# Patient Record
Sex: FEMALE | Race: WHITE | NOT HISPANIC OR LATINO | Employment: OTHER | URBAN - METROPOLITAN AREA
[De-identification: names, ages, dates, MRNs, and addresses within clinical notes are randomized per-mention and may not be internally consistent; named-entity substitution may affect disease eponyms.]

---

## 2018-01-29 ENCOUNTER — OFFICE VISIT (OUTPATIENT)
Dept: FAMILY MEDICINE | Facility: CLINIC | Age: 58
End: 2018-01-29
Payer: MEDICARE

## 2018-01-29 VITALS
DIASTOLIC BLOOD PRESSURE: 74 MMHG | BODY MASS INDEX: 26.8 KG/M2 | SYSTOLIC BLOOD PRESSURE: 118 MMHG | HEART RATE: 88 BPM | HEIGHT: 63 IN | WEIGHT: 151.25 LBS | TEMPERATURE: 98 F

## 2018-01-29 DIAGNOSIS — Z11.59 ENCOUNTER FOR HEPATITIS C SCREENING TEST FOR LOW RISK PATIENT: ICD-10-CM

## 2018-01-29 DIAGNOSIS — G25.0 ESSENTIAL TREMOR: ICD-10-CM

## 2018-01-29 DIAGNOSIS — G89.4 CHRONIC PAIN SYNDROME: ICD-10-CM

## 2018-01-29 DIAGNOSIS — E78.5 HYPERLIPIDEMIA, UNSPECIFIED HYPERLIPIDEMIA TYPE: ICD-10-CM

## 2018-01-29 DIAGNOSIS — Z12.39 SCREENING FOR MALIGNANT NEOPLASM OF BREAST: ICD-10-CM

## 2018-01-29 DIAGNOSIS — Z86.31 HISTORY OF DIABETIC ULCER OF FOOT: ICD-10-CM

## 2018-01-29 DIAGNOSIS — Z72.0 TOBACCO ABUSE: ICD-10-CM

## 2018-01-29 DIAGNOSIS — E11.42 DIABETIC POLYNEUROPATHY ASSOCIATED WITH TYPE 2 DIABETES MELLITUS: ICD-10-CM

## 2018-01-29 DIAGNOSIS — M51.36 DDD (DEGENERATIVE DISC DISEASE), LUMBAR: ICD-10-CM

## 2018-01-29 DIAGNOSIS — I10 ESSENTIAL HYPERTENSION: ICD-10-CM

## 2018-01-29 DIAGNOSIS — E11.42 TYPE 2 DIABETES MELLITUS WITH DIABETIC POLYNEUROPATHY, WITHOUT LONG-TERM CURRENT USE OF INSULIN: Primary | ICD-10-CM

## 2018-01-29 PROCEDURE — 99205 OFFICE O/P NEW HI 60 MIN: CPT | Mod: PBBFAC,PN | Performed by: INTERNAL MEDICINE

## 2018-01-29 PROCEDURE — 99204 OFFICE O/P NEW MOD 45 MIN: CPT | Mod: S$PBB,,, | Performed by: INTERNAL MEDICINE

## 2018-01-29 PROCEDURE — 99999 PR PBB SHADOW E&M-NEW PATIENT-LVL V: CPT | Mod: PBBFAC,,, | Performed by: INTERNAL MEDICINE

## 2018-01-29 RX ORDER — GABAPENTIN 100 MG/1
200 CAPSULE ORAL 3 TIMES DAILY
Qty: 180 CAPSULE | Refills: 1 | Status: SHIPPED | OUTPATIENT
Start: 2018-01-29 | End: 2018-04-25

## 2018-01-29 RX ORDER — GABAPENTIN 100 MG/1
100 CAPSULE ORAL 3 TIMES DAILY
COMMUNITY
End: 2018-01-29 | Stop reason: SDUPTHER

## 2018-01-29 RX ORDER — TRAMADOL HYDROCHLORIDE 50 MG/1
50 TABLET ORAL EVERY 12 HOURS PRN
COMMUNITY
End: 2018-06-12 | Stop reason: SDUPTHER

## 2018-01-29 RX ORDER — ATORVASTATIN CALCIUM 20 MG/1
20 TABLET, FILM COATED ORAL DAILY
COMMUNITY
End: 2018-06-19

## 2018-01-29 RX ORDER — LISINOPRIL AND HYDROCHLOROTHIAZIDE 12.5; 2 MG/1; MG/1
1 TABLET ORAL DAILY
COMMUNITY
End: 2018-05-23 | Stop reason: SDUPTHER

## 2018-01-29 RX ORDER — GLIPIZIDE 10 MG/1
5 TABLET, FILM COATED, EXTENDED RELEASE ORAL
COMMUNITY
End: 2018-10-12 | Stop reason: SDUPTHER

## 2018-01-29 RX ORDER — ASPIRIN 81 MG/1
81 TABLET ORAL DAILY
COMMUNITY

## 2018-01-29 RX ORDER — METFORMIN HYDROCHLORIDE 1000 MG/1
1000 TABLET ORAL 2 TIMES DAILY WITH MEALS
COMMUNITY
End: 2018-10-12 | Stop reason: SDUPTHER

## 2018-01-29 NOTE — PROGRESS NOTES
"Assessment and Plan:    1. Type 2 diabetes mellitus with diabetic polyneuropathy, without long-term current use of insulin    Suspect that diabetes has been poorly controlled. Reports that she does not like it when her blood sugar goes below 150 since this is "too low for me." Reports that her FBG is usually in the 180s-low 200s. Reports that it felt too high and she gets visual disturbances when her BG is >400. Reports that she has tried other oral medications (can not remember names) but that she did not like them because they made her blood sugar too low so that she felt lightheaded and pre-syncopal. Discussed that it is not ideal to continue to have BG this high, and that the feelings of it being too low typically go away with time once we could get it more normal.   Last A1c: Unknown, will check today  Foot exam: Today had decreased protective sensation, no ulcers, referral placed for podiatry  Eye exam: Needs, ordered today  Nephropathy screening: Due, ordered today  Lipids: On atorvastatin, will check lipids  Medications: Continue metformin and glipizide for now  We discussed the role of diet and exercise in managing diabetes at this visit.     - Hemoglobin A1c; Future  - Comprehensive metabolic panel; Future  - CBC auto differential; Future  - TSH; Future  - Microalbumin/creatinine urine ratio; Future  - Ambulatory referral to Optometry  - Ambulatory referral to Podiatry    2. Diabetic polyneuropathy associated with type 2 diabetes mellitus  - Ambulatory referral to Podiatry  - gabapentin (NEURONTIN) 100 MG capsule; Take 2 capsules (200 mg total) by mouth 3 (three) times daily.  Dispense: 180 capsule; Refill: 1    3. History of diabetic ulcer of foot  - Ambulatory referral to Podiatry    4. Essential hypertension  Controlled today. Continue lisinopril HCTZ.     5. Hyperlipidemia, unspecified hyperlipidemia type  Continue atorvastatin at current dose, will check lipids to see if a dose increase might be " warranted.   - Lipid panel; Future    6. Essential tremor  No need for medications, has been evaluated and told that she has benign essential tremor.     7. DDD (degenerative disc disease), lumbar  - Ambulatory referral to Pain Clinic    8. Chronic pain syndrome  She reports that the majority of her pain comes from the diabetic neuropathy as well as disc disease in her lower back. Discussed that chronic use of opiate pain medications including tramadol is not recommended. Discussed that it would be preferable to increase the dose of the gabapentin to help with nerve pain since she reports that the majority of her pain is from the diabetic nerve pain and use the tramadol only as needed. Also reports that ESIs have been very helpful for her, so hopefully will reduce need for pain medications.     9. Tobacco abuse  Reports that she enjoys smoking, thinks that it is not doing any damage to her, and would like to continue smoking. Reports that she is not at all interested in quitting.     10. Screening for malignant neoplasm of breast  - Mammo Digital Screening Bilat with CAD; Future    11. Encounter for hepatitis C screening test for low risk patient  - Hepatitis C antibody; Future      Colonoscopy was reportedly 2 years ago, can't remember the name of the doctor but thinks her PCP would have the records.   Last mammogram was 1 year ago, have always been normal  Pap was 2 years ago, not sure of the name of her OB/Gyn  ______________________________________________________________________  Subjective:    Chief Complaint:  New patient, establish care    HPI:  Mich is a 57 y.o. year old woman here to establish care. Just moved here from [place] last month. Was seeing Dr. Karla Lora for primary care in [place].     Diabetes- diagnosed 2006, has associated neuropathy. Has a history of diabetic foot infections, but has healed. Has not needed any amputations, but they have been considered in the past. Notes that her diabetes has  been typically poorly controlled. Notes that FBG used to be in the 400s, but lately has been in the high 100s. Has never been on insulin in the past. Has been on 2 other medications for diabetes that had adverse effects, thinks that one was invokana but can't remember for sure. Reports that she does not like her blood sugar to go below 150 because she feels lightheaded if her blood sugar is low.     HTN- Reports that blood pressure is typically well controlled on the current dose of the lisinopril/HCTZ. Has been on this dose for a while.     DDD- Was seeing Dr. Lugo for pain management in [place], had been getting steroid injections for this. His bilateral low back pain as well as sciatic nerve pain down her right leg.     Tobacco abuse- Notes that she smokes every day, but that she likes it and has no intention of quitting. Currently smoking 1/2 PPD and notes that she has never smoked more than this.     Was evaluated by a cardiologist last year due to an abnormality on her EKG, but she was told that her heart was fine. Had some tests, but not sure if she had a stress test.     Past Medical History:  Past Medical History:   Diagnosis Date    DDD (degenerative disc disease), lumbar     Diabetes mellitus     Essential tremor     HTN (hypertension)        Past Surgical History:  Past Surgical History:   Procedure Laterality Date    CHOLECYSTECTOMY         Family History:  Family History   Problem Relation Age of Onset    Heart disease Mother     Diabetes type II Father     Dementia Father     Heart disease Father        Social History:  Social History     Social History    Marital status:      Spouse name: N/A    Number of children: N/A    Years of education: N/A     Social History Main Topics    Smoking status: Current Every Day Smoker     Packs/day: 0.50     Years: 40.00    Smokeless tobacco: Never Used    Alcohol use 0.0 - 3.0 oz/week    Drug use: No    Sexual activity: Not Currently      Other Topics Concern    None     Social History Narrative    None       Medications:      Current Outpatient Prescriptions:     aspirin (ECOTRIN) 81 MG EC tablet, Take 81 mg by mouth once daily., Disp: , Rfl:     atorvastatin (LIPITOR) 20 MG tablet, Take 20 mg by mouth once daily., Disp: , Rfl:     gabapentin (NEURONTIN) 100 MG capsule, Take 100 mg by mouth 3 (three) times daily., Disp: , Rfl:     glipiZIDE (GLUCOTROL) 10 MG TR24, Take 5 mg by mouth daily with breakfast., Disp: , Rfl:     lisinopril-hydrochlorothiazide (PRINZIDE,ZESTORETIC) 20-12.5 mg per tablet, Take 1 tablet by mouth once daily., Disp: , Rfl:     metFORMIN (GLUCOPHAGE) 1000 MG tablet, Take 1,000 mg by mouth 2 (two) times daily with meals., Disp: , Rfl:     traMADol (ULTRAM) 50 mg tablet, Take 50 mg by mouth every 12 (twelve) hours as needed for Pain., Disp: , Rfl:       Allergies:  Patient has no known allergies.    Immunizations:    There is no immunization history on file for this patient.     Reports that she thinks vaccines are up to date  Had a tetanus shot a few months ago  Thinks that she had a pneumonia shot 2 years ago    Review of Systems:  Review of Systems   Constitutional: Negative for chills, fever and unexpected weight change.   HENT: Negative for congestion and trouble swallowing.    Eyes: Negative for pain and visual disturbance.   Respiratory: Negative for shortness of breath and wheezing.    Cardiovascular: Negative for chest pain and leg swelling.   Gastrointestinal: Negative for abdominal pain, constipation and diarrhea.   Endocrine: Negative for cold intolerance and heat intolerance.   Genitourinary: Negative for difficulty urinating and hematuria.   Musculoskeletal: Positive for back pain and gait problem. Negative for myalgias.   Skin: Negative for rash and wound.   Allergic/Immunologic: Negative for environmental allergies and food allergies.   Neurological: Negative for seizures and syncope.  "  Psychiatric/Behavioral: Positive for dysphoric mood. The patient is not nervous/anxious.        Objective:    Vitals:  Vitals:    01/29/18 0944   BP: 118/74   Pulse: 88   Temp: 97.9 °F (36.6 °C)   Weight: 68.6 kg (151 lb 3.8 oz)   Height: 5' 3" (1.6 m)   PainSc:   8   PainLoc: Back       Physical Exam   Constitutional: She is oriented to person, place, and time. She appears well-developed and well-nourished. No distress.   HENT:   Mouth/Throat: Oropharynx is clear and moist. No oropharyngeal exudate.   Eyes: Conjunctivae are normal. Right eye exhibits no discharge. Left eye exhibits no discharge. No scleral icterus.   Neck: Neck supple. No tracheal deviation present. No thyromegaly present.   Cardiovascular: Normal rate, regular rhythm, normal heart sounds and intact distal pulses.    No murmur heard.  Pulses:       Dorsalis pedis pulses are 2+ on the right side, and 2+ on the left side.   Pulmonary/Chest: Effort normal and breath sounds normal. No respiratory distress. She has no wheezes. She has no rales.   Abdominal: Soft. Bowel sounds are normal. She exhibits no distension. There is no tenderness. No hernia.   no HSM   Musculoskeletal: She exhibits no edema.        Right foot: There is no deformity.        Left foot: There is no deformity.   midline low back tenderness as well as lumbar paraspinal tenderness noted, positive straight leg raise bilaterally, pain reported with all movement   Feet:   Right Foot:   Protective Sensation: 5 sites tested. 4 sites sensed.   Skin Integrity: Positive for dry skin. Negative for ulcer, skin breakdown or callus.   Left Foot:   Protective Sensation: 5 sites tested. 3 sites sensed.   Skin Integrity: Positive for dry skin. Negative for ulcer, skin breakdown or callus.   Lymphadenopathy:     She has no cervical adenopathy.   Neurological: She is alert and oriented to person, place, and time.   Skin: Skin is warm and dry. She is not diaphoretic.   Psychiatric: She has a normal " mood and affect. Her behavior is normal. Judgment and thought content normal.   Vitals reviewed.      Data:  No previous labs, imaging, or notes available. and [facility] sent for previous records.        Jannet Márquez MD  Internal Medicine

## 2018-01-31 ENCOUNTER — OFFICE VISIT (OUTPATIENT)
Dept: PAIN MEDICINE | Facility: CLINIC | Age: 58
End: 2018-01-31
Payer: MEDICARE

## 2018-01-31 VITALS
HEART RATE: 87 BPM | SYSTOLIC BLOOD PRESSURE: 120 MMHG | BODY MASS INDEX: 26.58 KG/M2 | DIASTOLIC BLOOD PRESSURE: 83 MMHG | WEIGHT: 150 LBS | HEIGHT: 63 IN

## 2018-01-31 DIAGNOSIS — M79.10 MYALGIA: ICD-10-CM

## 2018-01-31 DIAGNOSIS — M51.36 DDD (DEGENERATIVE DISC DISEASE), LUMBAR: ICD-10-CM

## 2018-01-31 DIAGNOSIS — M47.896 OTHER SPONDYLOSIS, LUMBAR REGION: ICD-10-CM

## 2018-01-31 DIAGNOSIS — M53.3 SACROILIAC JOINT PAIN: Primary | ICD-10-CM

## 2018-01-31 PROCEDURE — 20553 NJX 1/MLT TRIGGER POINTS 3/>: CPT | Mod: PBBFAC,PO | Performed by: ANESTHESIOLOGY

## 2018-01-31 PROCEDURE — 99999 PR PBB SHADOW E&M-EST. PATIENT-LVL III: CPT | Mod: PBBFAC,,, | Performed by: ANESTHESIOLOGY

## 2018-01-31 PROCEDURE — 99213 OFFICE O/P EST LOW 20 MIN: CPT | Mod: PBBFAC,PO | Performed by: ANESTHESIOLOGY

## 2018-01-31 PROCEDURE — 99204 OFFICE O/P NEW MOD 45 MIN: CPT | Mod: 25,S$PBB,, | Performed by: ANESTHESIOLOGY

## 2018-01-31 PROCEDURE — 20553 NJX 1/MLT TRIGGER POINTS 3/>: CPT | Mod: S$PBB,,, | Performed by: ANESTHESIOLOGY

## 2018-01-31 RX ORDER — BUPIVACAINE HYDROCHLORIDE 2.5 MG/ML
10 INJECTION, SOLUTION EPIDURAL; INFILTRATION; INTRACAUDAL ONCE
Status: DISCONTINUED | OUTPATIENT
Start: 2018-01-31 | End: 2018-02-09

## 2018-01-31 NOTE — PROGRESS NOTES
This note was completed with dictation software and grammatical errors may exist.    Referring Physician: Jannet Márquez MD    PCP: Jannet Márquez MD    CC:R low back and R leg pain    HPI:   Mich Knight is a 57 y.o. female with a history of low back pain who recently moved down from [place]. Within the last 2 years she has had progression of low back pain and was seeing a PM&R physician in [place] (Dr. Ramu Lugo in [place]). She was receiving epidurals every 4 months, which she states was helping her pain, with TPIs every month in between. The pain begins in her low back, radiates primarily to her R buttock and down the leg to above the knee. She rarely has pain that goes past her R knee. She has concomitant diabetic neuropathy for which she recently increased her gabapentin dose. The pain as described as throbbing, shooting, deep, exacerbated by standing, sitting, bending, and walking. She denies any leg weakness. No bowel or bladder incontinence. She rates her pain as a 10 today.     ROS:  CONSTITUTIONAL: No fevers, chills, night sweats, wt. loss, appetite changes  SKIN: no rashes or itching  ENT: No headaches, head trauma, vision changes, or eye pain  LYMPH NODES: None noticed   CV: No chest pain, palpitations.   RESP: No shortness of breath, dyspnea on exertion, cough, wheezing, or hemoptysis  GI: No nausea, emesis, diarrhea, constipation, melena, hematochezia, pain.    : No dysuria, hematuria, urgency, or frequency   HEME: No easy bruising, bleeding problems  PSYCHIATRIC: No anxiety, psychosis, hallucinations. +ve depression  NEURO: No seizures, memory loss, dizziness or difficulty sleeping  MSK: +ve per HPI      Past Medical History:   Diagnosis Date    DDD (degenerative disc disease), lumbar     Diabetes mellitus     Essential tremor     HLD (hyperlipidemia)     HTN (hypertension)      Past Surgical History:   Procedure Laterality Date    CHOLECYSTECTOMY       Family History   Problem Relation  "Age of Onset    Heart disease Mother     Diabetes type II Father     Dementia Father     Heart disease Father      Social History     Social History    Marital status:      Spouse name: N/A    Number of children: N/A    Years of education: N/A     Social History Main Topics    Smoking status: Current Every Day Smoker     Packs/day: 0.50     Years: 40.00    Smokeless tobacco: Never Used    Alcohol use 0.0 - 3.0 oz/week    Drug use: No    Sexual activity: Not Currently     Other Topics Concern    None     Social History Narrative    Does not work, reports being disabled due to back pain. Previously was .          Medications/Allergies: See med card    Vitals:    01/31/18 0806   BP: 120/83   Pulse: 87   Weight: 68 kg (150 lb)   Height: 5' 3" (1.6 m)   PainSc: 10-Worst pain ever   PainLoc: Back         Physical exam:    GENERAL: A and O x3, the patient appears well groomed and is in no acute distress.  Skin: No rashes or obvious lesions  HEENT: normocephalic, atraumatic  CARDIOVASCULAR:  Palpable peripheral pulses  LUNGS: easy work of breathing  ABDOMEN: soft, nontender   UPPER EXTREMITIES: Normal alignment, normal range of motion, no atrophy, no skin changes,  hair growth and nail growth normal and equal bilaterally. No swelling, no tenderness.    LOWER EXTREMITIES:  Normal alignment, normal range of motion, no atrophy, no skin changes,  hair growth and nail growth normal and equal bilaterally. No swelling, no tenderness.    LUMBAR SPINE  Lumbar spine: ROM is full with flexion extension and oblique extension with no increased pain.    Levi's test causes increased pain on R side with bilateral maneuvers, mimicking the pain bringing her into the office  Supine straight leg raise is negative bilaterally.    Internal and external rotation of the hip causes no increased pain on either side.  Myofascial exam: tenderness with palpation along lower lumbar region, +ve tenderness along R SI " "joint      MENTAL STATUS: normal orientation, speech, language, and fund of knowledge for social situation.  Emotional state appropriate.    CRANIAL NERVES:  II:  PERRL bilaterally,   III,IV,VI: EOMI.    V:  Facial sensation equal bilaterally  VII:  Facial motor function normal.  VIII:  Hearing equal to finger rub bilaterally  IX/X: Gag normal, palate symmetric  XI:  Shoulder shrug equal, head turn equal  XII:  Tongue midline without fasciculations      MOTOR: Tone and bulk: normal bilateral upper and lower Strength: normal   Delt Bi Tri WE WF     R 5 5 5 5 5 5   L 5 5 5 5 5 5     IP ADD ABD Quad TA Gas HAM  R 4 4 4 4 4 4 4  L 4 4 4 4 4 4 4    SENSATION: Light touch and pinprick intact bilaterally  REFLEXES: normal, symmetric, nonbrisk.  Toes down, no clonus. No hoffmans.  GAIT: normal rise, base, steps, and arm swing.        Imaging:  None available.     Had MRI ~ 2 years ago which had "disc disease" per her report    Assessment:  58 yo female with low back pain radiating to R leg - maneuvers and history suggestive of SI joint disease  1. Sacroiliac joint pain    2. DDD (degenerative disc disease), lumbar    3. Other spondylosis, lumbar region    4. Myalgia        Plan:  - I have stressed the importance of physical activity and exercise to improve overall health  - Request past records to review  - I think that the patient's back pain are due to SI joint injection and have recommended a right SI joint injection.   - Perform TPIs to help with myofascial pain  - Follow up after procedure      Trigger point injection   Pre-procedure diagnosis: Myofascial trigger points in lumbar paraspinal region  Post-procedure diagnosis: Same    Upon examination, 4 trigger points were noted in the above noted regions.   Timeout performed. After the procedure was described and informed consent obtained, the skin over the trigger points was cleaned with isopropyl alcohol. Using a 27-gauge needle, injection of 2ml of 0.25% " bupivicaine was injected into each trigger point. The patient noted concordant radiating pain upon injection of her trigger points. The skin was then cleaned and bandages were applied to sites as necessary. Blood loss was <2ml. We observed the patient for 10 minutes after the procedure for any complications, and as there were none noted, the patient was then discharged home with instructions. We advised the patient that during the duration of the local anesthetic effect, this would be the optimal time to perform stretching exercises for the muscles which contain the trigger points. We also advised the patient to continue these exercises daily as this would likely give the best chance of resolution of the trigger points.

## 2018-01-31 NOTE — LETTER
January 31, 2018      Jannet Márquez MD  [address]  [place] [address]           [facility]  [address] [place] [address]  [address]  [place] [address]  Phone: [phone]          Patient: Mich Knight   MR Number: [id number]   YOB: 1960   Date of Visit: 1/31/2018       Dear Dr. Jannet Márquez:    Thank you for referring Mich Knight to me for evaluation. Attached you will find relevant portions of my assessment and plan of care.    If you have questions, please do not hesitate to call me. I look forward to following Mich Knight along with you.    Sincerely,    Charles Knowles MD    Enclosure  CC:  No Recipients    If you would like to receive this communication electronically, please contact [email or web address] or [phone] to request more information on [employer] Link access.    For providers and/or their staff who would like to refer a patient to [facility], please contact us through our one-stop-shop provider referral line, [facility], at [phone].    If you feel you have received this communication in error or would no longer like to receive these types of communications, please e-mail [email or web address]

## 2018-02-01 DIAGNOSIS — M46.1 SACROILIITIS: Primary | ICD-10-CM

## 2018-02-02 DIAGNOSIS — Z12.11 COLON CANCER SCREENING: ICD-10-CM

## 2018-02-07 ENCOUNTER — DOCUMENTATION ONLY (OUTPATIENT)
Dept: PAIN MEDICINE | Facility: CLINIC | Age: 58
End: 2018-02-07

## 2018-02-07 NOTE — PROGRESS NOTES
MRI L-spine 9/2016      L4-5, moderate disc desiccation.  Concentric disc bulge with small central protrusion.  Left greater than right subarticular protrusion.  Moderate bilateral facet arthropathy.  Mild central canal stenosis.  Moderate advanced left foraminal stenosis.  L5-S1 advanced disc desiccation loss of disc height.  Broad-based disc protrusion to the right.  Moderate bilateral facet arthropathy.  There is advanced right foraminal stenosis.  A minimal left foraminal stenosis.

## 2018-02-09 ENCOUNTER — SURGERY (OUTPATIENT)
Age: 58
End: 2018-02-09

## 2018-02-09 ENCOUNTER — HOSPITAL ENCOUNTER (OUTPATIENT)
Facility: AMBULARY SURGERY CENTER | Age: 58
Discharge: HOME | End: 2018-02-09
Attending: ANESTHESIOLOGY | Admitting: ANESTHESIOLOGY
Payer: MEDICARE

## 2018-02-09 DIAGNOSIS — G89.29 CHRONIC SI JOINT PAIN: Primary | ICD-10-CM

## 2018-02-09 DIAGNOSIS — M53.3 CHRONIC SI JOINT PAIN: Primary | ICD-10-CM

## 2018-02-09 DIAGNOSIS — G89.4 CHRONIC PAIN SYNDROME: ICD-10-CM

## 2018-02-09 PROCEDURE — 99152 MOD SED SAME PHYS/QHP 5/>YRS: CPT | Mod: ,,, | Performed by: ANESTHESIOLOGY

## 2018-02-09 PROCEDURE — G8907 PT DOC NO EVENTS ON DISCHARG: HCPCS | Performed by: ANESTHESIOLOGY

## 2018-02-09 PROCEDURE — 27096 INJECT SACROILIAC JOINT: CPT | Mod: RT,,, | Performed by: ANESTHESIOLOGY

## 2018-02-09 PROCEDURE — 27096 INJECT SACROILIAC JOINT: CPT | Performed by: ANESTHESIOLOGY

## 2018-02-09 RX ORDER — DEXAMETHASONE SODIUM PHOSPHATE 10 MG/ML
INJECTION INTRAMUSCULAR; INTRAVENOUS
Status: DISCONTINUED
Start: 2018-02-09 | End: 2018-02-09 | Stop reason: HOSPADM

## 2018-02-09 RX ORDER — SODIUM CHLORIDE, SODIUM LACTATE, POTASSIUM CHLORIDE, CALCIUM CHLORIDE 600; 310; 30; 20 MG/100ML; MG/100ML; MG/100ML; MG/100ML
INJECTION, SOLUTION INTRAVENOUS ONCE AS NEEDED
Status: DISCONTINUED | OUTPATIENT
Start: 2018-02-09 | End: 2018-02-09 | Stop reason: HOSPADM

## 2018-02-09 RX ORDER — BUPIVACAINE HYDROCHLORIDE 2.5 MG/ML
INJECTION, SOLUTION EPIDURAL; INFILTRATION; INTRACAUDAL
Status: DISCONTINUED | OUTPATIENT
Start: 2018-02-09 | End: 2018-02-09 | Stop reason: HOSPADM

## 2018-02-09 RX ORDER — DEXAMETHASONE SODIUM PHOSPHATE 100 MG/10ML
INJECTION INTRAMUSCULAR; INTRAVENOUS
Status: DISCONTINUED | OUTPATIENT
Start: 2018-02-09 | End: 2018-02-09 | Stop reason: HOSPADM

## 2018-02-09 RX ORDER — LIDOCAINE HYDROCHLORIDE 10 MG/ML
INJECTION, SOLUTION EPIDURAL; INFILTRATION; INTRACAUDAL; PERINEURAL
Status: DISCONTINUED | OUTPATIENT
Start: 2018-02-09 | End: 2018-02-09 | Stop reason: HOSPADM

## 2018-02-09 RX ORDER — LIDOCAINE HYDROCHLORIDE 10 MG/ML
INJECTION, SOLUTION EPIDURAL; INFILTRATION; INTRACAUDAL; PERINEURAL
Status: DISCONTINUED
Start: 2018-02-09 | End: 2018-02-09 | Stop reason: HOSPADM

## 2018-02-09 RX ORDER — ALPRAZOLAM 0.25 MG/1
1 TABLET ORAL ONCE
Status: COMPLETED | OUTPATIENT
Start: 2018-02-09 | End: 2018-02-09

## 2018-02-09 RX ADMIN — DEXAMETHASONE SODIUM PHOSPHATE 10 MG: 100 INJECTION INTRAMUSCULAR; INTRAVENOUS at 02:02

## 2018-02-09 RX ADMIN — ALPRAZOLAM 1 MG: 0.25 TABLET ORAL at 01:02

## 2018-02-09 RX ADMIN — BUPIVACAINE HYDROCHLORIDE 3 MG: 2.5 INJECTION, SOLUTION EPIDURAL; INFILTRATION; INTRACAUDAL at 02:02

## 2018-02-09 RX ADMIN — LIDOCAINE HYDROCHLORIDE 5 ML: 10 INJECTION, SOLUTION EPIDURAL; INFILTRATION; INTRACAUDAL; PERINEURAL at 02:02

## 2018-02-09 NOTE — DISCHARGE SUMMARY
[facility]  Discharge Note  Short Stay    Admit Date: 2/9/2018    Discharge Date and Time: 2/9/2018    Attending Physician: Charles Knowles MD     Discharge Provider: Charles Knowles    Diagnoses:  Active Hospital Problems    Diagnosis  POA    *Chronic SI joint pain [M53.3, G89.29]  Yes      Resolved Hospital Problems    Diagnosis Date Resolved POA   No resolved problems to display.       Hospital Course: SI joint injection  Discharged Condition: Good    Final Diagnoses:   Active Hospital Problems    Diagnosis  POA    *Chronic SI joint pain [M53.3, G89.29]  Yes      Resolved Hospital Problems    Diagnosis Date Resolved POA   No resolved problems to display.       Disposition: Home or Self Care    Follow up/Patient Instructions:    Medications:'  Reconciled Home Medications:   Current Discharge Medication List      CONTINUE these medications which have NOT CHANGED    Details   gabapentin (NEURONTIN) 100 MG capsule Take 2 capsules (200 mg total) by mouth 3 (three) times daily.  Qty: 180 capsule, Refills: 1    Associated Diagnoses: Diabetic polyneuropathy associated with type 2 diabetes mellitus      lisinopril-hydrochlorothiazide (PRINZIDE,ZESTORETIC) 20-12.5 mg per tablet Take 1 tablet by mouth once daily.      traMADol (ULTRAM) 50 mg tablet Take 50 mg by mouth every 12 (twelve) hours as needed for Pain.      aspirin (ECOTRIN) 81 MG EC tablet Take 81 mg by mouth once daily.      atorvastatin (LIPITOR) 20 MG tablet Take 20 mg by mouth once daily.      glipiZIDE (GLUCOTROL) 10 MG TR24 Take 5 mg by mouth daily with breakfast.      metFORMIN (GLUCOPHAGE) 1000 MG tablet Take 1,000 mg by mouth 2 (two) times daily with meals.             Discharge Procedure Orders  Call MD for:  temperature >100.4     Call MD for:  persistent nausea and vomiting or diarrhea     Call MD for:  severe uncontrolled pain     Call MD for:  redness, tenderness, or signs of infection (pain, swelling, redness, odor or green/yellow discharge  around incision site)     Call MD for:  difficulty breathing or increased cough     Call MD for:  severe persistent headache          Follow up with MD in 2-3 weeks    Discharge Procedure Orders (must include Diet, Follow-up, Activity):    Discharge Procedure Orders (must include Diet, Follow-up, Activity)  Call MD for:  temperature >100.4     Call MD for:  persistent nausea and vomiting or diarrhea     Call MD for:  severe uncontrolled pain     Call MD for:  redness, tenderness, or signs of infection (pain, swelling, redness, odor or green/yellow discharge around incision site)     Call MD for:  difficulty breathing or increased cough     Call MD for:  severe persistent headache

## 2018-02-09 NOTE — OP NOTE
Procedure Date: 2/9/2018    PROCEDURE:  Right sacroiliac joint injection utilizing fluoroscopy.    DIAGNOSIS: Right sided sacroiliitis.  Post op diagnosis: same    PHYSICIAN: Charles Knowles MD    MEDICATIONS INJECTED:  Dexamethasone 10mg and 1.5ml Bupivacaine 0.25%.    LOCAL ANESTHETIC USED: Lidocaine 1%,2ml total.     SEDATION MEDICATIONS: RN IV sedation    ESTIMATED BLOOD LOSS: None    COMPLICATIONS: None    TECHNIQUE:   Time-out taken to identify patient and procedure side prior to starting the procedure. After placing the patient in the prone position, the patient was prepped and draped in the usual sterile fashion using ChloraPrep and sterile towels.  The area was determined under fluoroscopy in the AP view.  Lidocaine was injected by raising a wheal and going down to the periosteum using a 25-gauge 1.5 inch needle.  The 3.5 inch 22-gauge spinal needle was introduced into inferior opening of the right sacroiliac joint.  Negative pressure applied to confirm no intravascular placement.  0.5 ml of contrast dye was injected to confirm placement and to confirm that there was no vascular uptake. The medication was then injected slowly. The patient tolerated the procedure well.    The patient was monitored after the procedure.  The patient was given post procedure and discharge instructions to follow at home. The patient was discharged in a stable condition

## 2018-02-09 NOTE — H&P (VIEW-ONLY)
This note was completed with dictation software and grammatical errors may exist.    Referring Physician: Jannet Márquez MD    PCP: Jannet Márquez MD    CC:R low back and R leg pain    HPI:   Mich Knight is a 57 y.o. female with a history of low back pain who recently moved down from [place]. Within the last 2 years she has had progression of low back pain and was seeing a PM&R physician in [place] (Dr. Ramu Lugo in [place]). She was receiving epidurals every 4 months, which she states was helping her pain, with TPIs every month in between. The pain begins in her low back, radiates primarily to her R buttock and down the leg to above the knee. She rarely has pain that goes past her R knee. She has concomitant diabetic neuropathy for which she recently increased her gabapentin dose. The pain as described as throbbing, shooting, deep, exacerbated by standing, sitting, bending, and walking. She denies any leg weakness. No bowel or bladder incontinence. She rates her pain as a 10 today.     ROS:  CONSTITUTIONAL: No fevers, chills, night sweats, wt. loss, appetite changes  SKIN: no rashes or itching  ENT: No headaches, head trauma, vision changes, or eye pain  LYMPH NODES: None noticed   CV: No chest pain, palpitations.   RESP: No shortness of breath, dyspnea on exertion, cough, wheezing, or hemoptysis  GI: No nausea, emesis, diarrhea, constipation, melena, hematochezia, pain.    : No dysuria, hematuria, urgency, or frequency   HEME: No easy bruising, bleeding problems  PSYCHIATRIC: No anxiety, psychosis, hallucinations. +ve depression  NEURO: No seizures, memory loss, dizziness or difficulty sleeping  MSK: +ve per HPI      Past Medical History:   Diagnosis Date    DDD (degenerative disc disease), lumbar     Diabetes mellitus     Essential tremor     HLD (hyperlipidemia)     HTN (hypertension)      Past Surgical History:   Procedure Laterality Date    CHOLECYSTECTOMY       Family History   Problem Relation  "Age of Onset    Heart disease Mother     Diabetes type II Father     Dementia Father     Heart disease Father      Social History     Social History    Marital status:      Spouse name: N/A    Number of children: N/A    Years of education: N/A     Social History Main Topics    Smoking status: Current Every Day Smoker     Packs/day: 0.50     Years: 40.00    Smokeless tobacco: Never Used    Alcohol use 0.0 - 3.0 oz/week    Drug use: No    Sexual activity: Not Currently     Other Topics Concern    None     Social History Narrative    Does not work, reports being disabled due to back pain. Previously was .          Medications/Allergies: See med card    Vitals:    01/31/18 0806   BP: 120/83   Pulse: 87   Weight: 68 kg (150 lb)   Height: 5' 3" (1.6 m)   PainSc: 10-Worst pain ever   PainLoc: Back         Physical exam:    GENERAL: A and O x3, the patient appears well groomed and is in no acute distress.  Skin: No rashes or obvious lesions  HEENT: normocephalic, atraumatic  CARDIOVASCULAR:  Palpable peripheral pulses  LUNGS: easy work of breathing  ABDOMEN: soft, nontender   UPPER EXTREMITIES: Normal alignment, normal range of motion, no atrophy, no skin changes,  hair growth and nail growth normal and equal bilaterally. No swelling, no tenderness.    LOWER EXTREMITIES:  Normal alignment, normal range of motion, no atrophy, no skin changes,  hair growth and nail growth normal and equal bilaterally. No swelling, no tenderness.    LUMBAR SPINE  Lumbar spine: ROM is full with flexion extension and oblique extension with no increased pain.    Levi's test causes increased pain on R side with bilateral maneuvers, mimicking the pain bringing her into the office  Supine straight leg raise is negative bilaterally.    Internal and external rotation of the hip causes no increased pain on either side.  Myofascial exam: tenderness with palpation along lower lumbar region, +ve tenderness along R SI " "joint      MENTAL STATUS: normal orientation, speech, language, and fund of knowledge for social situation.  Emotional state appropriate.    CRANIAL NERVES:  II:  PERRL bilaterally,   III,IV,VI: EOMI.    V:  Facial sensation equal bilaterally  VII:  Facial motor function normal.  VIII:  Hearing equal to finger rub bilaterally  IX/X: Gag normal, palate symmetric  XI:  Shoulder shrug equal, head turn equal  XII:  Tongue midline without fasciculations      MOTOR: Tone and bulk: normal bilateral upper and lower Strength: normal   Delt Bi Tri WE WF     R 5 5 5 5 5 5   L 5 5 5 5 5 5     IP ADD ABD Quad TA Gas HAM  R 4 4 4 4 4 4 4  L 4 4 4 4 4 4 4    SENSATION: Light touch and pinprick intact bilaterally  REFLEXES: normal, symmetric, nonbrisk.  Toes down, no clonus. No hoffmans.  GAIT: normal rise, base, steps, and arm swing.        Imaging:  None available.     Had MRI ~ 2 years ago which had "disc disease" per her report    Assessment:  56 yo female with low back pain radiating to R leg - maneuvers and history suggestive of SI joint disease  1. Sacroiliac joint pain    2. DDD (degenerative disc disease), lumbar    3. Other spondylosis, lumbar region    4. Myalgia        Plan:  - I have stressed the importance of physical activity and exercise to improve overall health  - Request past records to review  - I think that the patient's back pain are due to SI joint injection and have recommended a right SI joint injection.   - Perform TPIs to help with myofascial pain  - Follow up after procedure      Trigger point injection   Pre-procedure diagnosis: Myofascial trigger points in lumbar paraspinal region  Post-procedure diagnosis: Same    Upon examination, 4 trigger points were noted in the above noted regions.   Timeout performed. After the procedure was described and informed consent obtained, the skin over the trigger points was cleaned with isopropyl alcohol. Using a 27-gauge needle, injection of 2ml of 0.25% " bupivicaine was injected into each trigger point. The patient noted concordant radiating pain upon injection of her trigger points. The skin was then cleaned and bandages were applied to sites as necessary. Blood loss was <2ml. We observed the patient for 10 minutes after the procedure for any complications, and as there were none noted, the patient was then discharged home with instructions. We advised the patient that during the duration of the local anesthetic effect, this would be the optimal time to perform stretching exercises for the muscles which contain the trigger points. We also advised the patient to continue these exercises daily as this would likely give the best chance of resolution of the trigger points.

## 2018-02-09 NOTE — DISCHARGE INSTRUCTIONS
Recovery After Procedural Sedation (Adult)  You have been given medicine by vein to make you sleep during your surgery. This may have included both a pain medicine and sleeping medicine. Most of the effects have worn off. But you may still have some drowsiness for the next 6 to 8 hours.  Home care  Follow these guidelines when you get home:  · For the next 8 hours, you should be watched by a responsible adult. This person should make sure your condition is not getting worse.  · Don't drink any alcohol for the next 24 hours.  · Don't drive, operate dangerous machinery, or make important business or personal decisions during the next 24 hours.  Note: Your healthcare provider may tell you not to take any medicine by mouth for pain or sleep in the next 4 hours. These medicines may react with the medicines you were given in the hospital. This could cause a much stronger response than usual.  Follow-up care  Follow up with your healthcare provider if you are not alert and back to your usual level of activity within 12 hours.  When to seek medical advice  Call your healthcare provider right away if any of these occur:  · Drowsiness gets worse  · Weakness or dizziness gets worse  · Repeated vomiting  · You can't be awakened   Date Last Reviewed: 10/18/2016  © 2027-8604 [employer]. [address] [place] [address], [place] [address]. All rights reserved. This information is not intended as a substitute for professional medical care. Always follow your healthcare professional's instructions.      Pain injection instructions:     Steroids take about a week to relieve pain.  Initially you may get pain relief from the local anesthetic but this may wear off before the steroid works.    No driving for 24 hrs   Activity as tolerated- gradually increase activities.  Dont lift over 10 lbs for 24 hrs   No heat at injection sites x 2 days. No hot tubs, saunas, swimming pools, or heating pads for 2 days.  Use ice pack for mild  swelling and for comfort at 20 minute intervals, 20 minutes on 20 minutes off.  May shower today. No tub baths for two days.      Resume Aspirin, Plavix, or Coumadin the day after the procedure unless otherwise instructed.   If diabetic,monitor your glucose carefully as steroids can increase glucose level    Seek immediate medical help for:   Severe increase in your usual pain or appearance of new pain.  Prolonged or increasing weakness or numbness in the legs or arms.    - Numbing medicine was injected that affects nerves that carry information from     the  muscles to brain and  The brain to the muscles.  This numbness can last 4-6 hrs so be very careful to prevent falls; get assistance standing or  walking.    Fever above 101 ,Drainage,redness,active bleeding, or increased swelling at the injection site.  Headache, shortness of breath, chest pain, or breathing problems.

## 2018-02-09 NOTE — PLAN OF CARE
Patient sitting in chair and states she is ready to go home. Patient states has clean ice pack at home for as needed use for pain;able to teach back usage and precautions. Patient denies nausea, pain or any limb weakness.Patient's friend chairside and states she is ready to take patient home; friend states she is driving.

## 2018-02-14 VITALS
HEIGHT: 63 IN | HEART RATE: 84 BPM | BODY MASS INDEX: 26.58 KG/M2 | OXYGEN SATURATION: 99 % | DIASTOLIC BLOOD PRESSURE: 82 MMHG | SYSTOLIC BLOOD PRESSURE: 117 MMHG | RESPIRATION RATE: 18 BRPM | TEMPERATURE: 98 F | WEIGHT: 150 LBS

## 2018-02-20 ENCOUNTER — LAB VISIT (OUTPATIENT)
Dept: LAB | Facility: HOSPITAL | Age: 58
End: 2018-02-20
Attending: INTERNAL MEDICINE
Payer: MEDICARE

## 2018-02-20 DIAGNOSIS — Z11.59 ENCOUNTER FOR HEPATITIS C SCREENING TEST FOR LOW RISK PATIENT: ICD-10-CM

## 2018-02-20 DIAGNOSIS — E78.5 HYPERLIPIDEMIA, UNSPECIFIED HYPERLIPIDEMIA TYPE: ICD-10-CM

## 2018-02-20 DIAGNOSIS — E11.42 TYPE 2 DIABETES MELLITUS WITH DIABETIC POLYNEUROPATHY, WITHOUT LONG-TERM CURRENT USE OF INSULIN: ICD-10-CM

## 2018-02-20 LAB
ALBUMIN SERPL BCP-MCNC: 3.4 G/DL
ALP SERPL-CCNC: 51 U/L
ALT SERPL W/O P-5'-P-CCNC: 30 U/L
ANION GAP SERPL CALC-SCNC: 9 MMOL/L
ANISOCYTOSIS BLD QL SMEAR: SLIGHT
AST SERPL-CCNC: 20 U/L
BASOPHILS # BLD AUTO: 0.1 K/UL
BASOPHILS NFR BLD: 0.8 %
BILIRUB SERPL-MCNC: 0.2 MG/DL
BUN SERPL-MCNC: 19 MG/DL
CALCIUM SERPL-MCNC: 9.5 MG/DL
CHLORIDE SERPL-SCNC: 103 MMOL/L
CHOLEST SERPL-MCNC: 186 MG/DL
CHOLEST/HDLC SERPL: 4.5 {RATIO}
CO2 SERPL-SCNC: 24 MMOL/L
CREAT SERPL-MCNC: 0.8 MG/DL
DIFFERENTIAL METHOD: ABNORMAL
EOSINOPHIL # BLD AUTO: 0.8 K/UL
EOSINOPHIL NFR BLD: 6.5 %
ERYTHROCYTE [DISTWIDTH] IN BLOOD BY AUTOMATED COUNT: 12.5 %
EST. GFR  (AFRICAN AMERICAN): >60 ML/MIN/1.73 M^2
EST. GFR  (NON AFRICAN AMERICAN): >60 ML/MIN/1.73 M^2
ESTIMATED AVG GLUCOSE: 154 MG/DL
GLUCOSE SERPL-MCNC: 169 MG/DL
HBA1C MFR BLD HPLC: 7 %
HCT VFR BLD AUTO: 34.8 %
HCV AB SERPL QL IA: POSITIVE
HDLC SERPL-MCNC: 41 MG/DL
HDLC SERPL: 22 %
HGB BLD-MCNC: 11.9 G/DL
IMM GRANULOCYTES # BLD AUTO: 0.05 K/UL
IMM GRANULOCYTES NFR BLD AUTO: 0.4 %
LDLC SERPL CALC-MCNC: ABNORMAL MG/DL
LYMPHOCYTES # BLD AUTO: 5.8 K/UL
LYMPHOCYTES NFR BLD: 46.9 %
MCH RBC QN AUTO: 30.6 PG
MCHC RBC AUTO-ENTMCNC: 34.2 G/DL
MCV RBC AUTO: 90 FL
MONOCYTES # BLD AUTO: 1.1 K/UL
MONOCYTES NFR BLD: 8.6 %
NEUTROPHILS # BLD AUTO: 4.6 K/UL
NEUTROPHILS NFR BLD: 36.8 %
NONHDLC SERPL-MCNC: 145 MG/DL
NRBC BLD-RTO: 0 /100 WBC
PLATELET # BLD AUTO: 367 K/UL
PLATELET BLD QL SMEAR: ABNORMAL
PMV BLD AUTO: 12 FL
POLYCHROMASIA BLD QL SMEAR: ABNORMAL
POTASSIUM SERPL-SCNC: 4.3 MMOL/L
PROT SERPL-MCNC: 7.8 G/DL
RBC # BLD AUTO: 3.89 M/UL
SODIUM SERPL-SCNC: 136 MMOL/L
TRIGL SERPL-MCNC: 401 MG/DL
TSH SERPL DL<=0.005 MIU/L-ACNC: 1.03 UIU/ML
WBC # BLD AUTO: 12.38 K/UL

## 2018-02-20 PROCEDURE — 85025 COMPLETE CBC W/AUTO DIFF WBC: CPT

## 2018-02-20 PROCEDURE — 84443 ASSAY THYROID STIM HORMONE: CPT

## 2018-02-20 PROCEDURE — 83036 HEMOGLOBIN GLYCOSYLATED A1C: CPT

## 2018-02-20 PROCEDURE — 80053 COMPREHEN METABOLIC PANEL: CPT

## 2018-02-20 PROCEDURE — 36415 COLL VENOUS BLD VENIPUNCTURE: CPT | Mod: PN

## 2018-02-20 PROCEDURE — 80061 LIPID PANEL: CPT

## 2018-02-20 PROCEDURE — 86803 HEPATITIS C AB TEST: CPT

## 2018-02-21 ENCOUNTER — TELEPHONE (OUTPATIENT)
Dept: FAMILY MEDICINE | Facility: CLINIC | Age: 58
End: 2018-02-21

## 2018-02-23 ENCOUNTER — OFFICE VISIT (OUTPATIENT)
Dept: FAMILY MEDICINE | Facility: CLINIC | Age: 58
End: 2018-02-23
Payer: MEDICARE

## 2018-02-23 ENCOUNTER — LAB VISIT (OUTPATIENT)
Dept: LAB | Facility: HOSPITAL | Age: 58
End: 2018-02-23
Attending: INTERNAL MEDICINE
Payer: MEDICARE

## 2018-02-23 VITALS
TEMPERATURE: 98 F | WEIGHT: 149.06 LBS | HEIGHT: 63 IN | HEART RATE: 104 BPM | SYSTOLIC BLOOD PRESSURE: 122 MMHG | BODY MASS INDEX: 26.41 KG/M2 | DIASTOLIC BLOOD PRESSURE: 64 MMHG

## 2018-02-23 DIAGNOSIS — R76.8 HEPATITIS C ANTIBODY POSITIVE IN BLOOD: ICD-10-CM

## 2018-02-23 DIAGNOSIS — G89.4 CHRONIC PAIN SYNDROME: ICD-10-CM

## 2018-02-23 DIAGNOSIS — R76.8 HEPATITIS C ANTIBODY POSITIVE IN BLOOD: Primary | ICD-10-CM

## 2018-02-23 DIAGNOSIS — Z11.4 ENCOUNTER FOR SCREENING FOR HIV: ICD-10-CM

## 2018-02-23 DIAGNOSIS — Z72.51 HIGH RISK SEXUAL BEHAVIOR: ICD-10-CM

## 2018-02-23 DIAGNOSIS — Z13.5 DIABETIC RETINOPATHY SCREENING: ICD-10-CM

## 2018-02-23 DIAGNOSIS — Z72.0 TOBACCO ABUSE: ICD-10-CM

## 2018-02-23 PROCEDURE — 99214 OFFICE O/P EST MOD 30 MIN: CPT | Mod: S$PBB,,, | Performed by: INTERNAL MEDICINE

## 2018-02-23 PROCEDURE — 99999 PR PBB SHADOW E&M-EST. PATIENT-LVL III: CPT | Mod: PBBFAC,,, | Performed by: INTERNAL MEDICINE

## 2018-02-23 PROCEDURE — 86704 HEP B CORE ANTIBODY TOTAL: CPT

## 2018-02-23 PROCEDURE — 99213 OFFICE O/P EST LOW 20 MIN: CPT | Mod: PBBFAC,PN | Performed by: INTERNAL MEDICINE

## 2018-02-23 PROCEDURE — 36415 COLL VENOUS BLD VENIPUNCTURE: CPT | Mod: PN

## 2018-02-23 PROCEDURE — 87522 HEPATITIS C REVRS TRNSCRPJ: CPT

## 2018-02-23 PROCEDURE — 86706 HEP B SURFACE ANTIBODY: CPT

## 2018-02-23 PROCEDURE — 87340 HEPATITIS B SURFACE AG IA: CPT

## 2018-02-23 PROCEDURE — 86703 HIV-1/HIV-2 1 RESULT ANTBDY: CPT

## 2018-02-23 NOTE — PROGRESS NOTES
Assessment and Plan:    1. Hepatitis C antibody positive in blood  Notes that she has never been told about a positive test before. Risk factors are mainly just sexual contacts, no history of IVDU or blood transfusions. Can not recall if she has ever been tested for hep B or HIV so will test for those as well. Discussed that we will check the Hep C RNA to ensure true positive, then refer to Hep C clinic for evaluation and discussion of possible treatment. Advised avoiding alcohol exposure.   - HEPATITIS C RNA, QUANTITATIVE, PCR; Future  - HIV-1 and HIV-2 antibodies; Future  - Hepatitis B core antibody, total; Future  - Hepatitis B surface antibody; Future  - Hepatitis B surface antigen; Future    2. High risk sexual behavior  - HIV-1 and HIV-2 antibodies; Future    3. Encounter for screening for HIV  - HIV-1 and HIV-2 antibodies; Future    4. Chronic pain syndrome  Recently had MARILIA but notes that this was only helpful for about a week. Has a prescription for tramadol, but unable to afford this currently so not taking it. Same with gabapentin. We discussed briefly that if she were to decide to quit smoking, she would likely save some money and be better able to afford medications.     5. Tobacco abuse  Not interested in quitting.         ______________________________________________________________________  Subjective:    Chief Complaint:  Follow up labs    HPI:  Mich is a 57 y.o. year old woman here to follow up labs.     She notes that she is not sure if she has ever had a test for Hep C in the past. No history of IVDU and no history of blood transfusions. Notes that she did have a sexual partner that she thinks later developed some sort of liver disease later. She notes that she did once try to give blood many years ago, and this was rejected but she is not sure why.     Back pain- Recently had an epidural steroid injection but notes that this was only helpful for one week. Has been out of the gabapentin due to  difficulty affording her medications. Notes that she has also run out of the tramadol. has a prescription for this, but has to wait until next week to have the money to afford it.     HLD- She has also run out of the atorvastatin about 5 days ago. Planning to refill this when she has the money.     Notes that she is still smoking. Not interested in quitting.     Medications:  Current Outpatient Prescriptions on File Prior to Visit   Medication Sig Dispense Refill    aspirin (ECOTRIN) 81 MG EC tablet Take 81 mg by mouth once daily.      atorvastatin (LIPITOR) 20 MG tablet Take 20 mg by mouth once daily.      gabapentin (NEURONTIN) 100 MG capsule Take 2 capsules (200 mg total) by mouth 3 (three) times daily. 180 capsule 1    glipiZIDE (GLUCOTROL) 10 MG TR24 Take 5 mg by mouth daily with breakfast.      lisinopril-hydrochlorothiazide (PRINZIDE,ZESTORETIC) 20-12.5 mg per tablet Take 1 tablet by mouth once daily.      metFORMIN (GLUCOPHAGE) 1000 MG tablet Take 1,000 mg by mouth 2 (two) times daily with meals.      traMADol (ULTRAM) 50 mg tablet Take 50 mg by mouth every 12 (twelve) hours as needed for Pain.       No current facility-administered medications on file prior to visit.        Review of Systems:  Review of Systems   Constitutional: Negative for chills and fever.   Respiratory: Negative for shortness of breath and wheezing.    Cardiovascular: Negative for chest pain and leg swelling.   Gastrointestinal: Negative for abdominal distention and abdominal pain.   Musculoskeletal: Positive for back pain and gait problem.   Neurological: Positive for numbness. Negative for seizures and syncope.       Past Medical History:  Past Medical History:   Diagnosis Date    DDD (degenerative disc disease), lumbar     Diabetes mellitus     Essential tremor     HLD (hyperlipidemia)     HTN (hypertension)        Objective:    Vitals:  Vitals:    02/23/18 0954 02/23/18 1035   BP: (!) 117/40 122/64   Pulse: 104    Temp:  "98.3 °F (36.8 °C)    Weight: 67.6 kg (149 lb 0.5 oz)    Height: 5' 3" (1.6 m)    PainSc: 10-Worst pain ever    PainLoc: Back        Physical Exam   Constitutional: She is oriented to person, place, and time. She appears well-developed and well-nourished. No distress.   HENT:   Mouth/Throat: Oropharynx is clear and moist.   Eyes: No scleral icterus.   Pulmonary/Chest: Effort normal. No respiratory distress.   Genitourinary: Guaiac stool:    Musculoskeletal: She exhibits no edema.   Neurological: She is alert and oriented to person, place, and time.   Skin: Skin is warm and dry.   Psychiatric: She has a normal mood and affect. Her behavior is normal.   Vitals reviewed.      Data:  Previous labs reviewed and pertinent for positive hep C antibody.      Jannet Márquez MD  Internal Medicine  "

## 2018-02-26 LAB
HBV CORE AB SERPL QL IA: NEGATIVE
HBV SURFACE AB SER-ACNC: NEGATIVE M[IU]/ML
HBV SURFACE AG SERPL QL IA: NEGATIVE
HIV 1+2 AB+HIV1 P24 AG SERPL QL IA: NEGATIVE

## 2018-02-27 ENCOUNTER — TELEPHONE (OUTPATIENT)
Dept: FAMILY MEDICINE | Facility: CLINIC | Age: 58
End: 2018-02-27

## 2018-02-27 DIAGNOSIS — B18.2 CHRONIC HEPATITIS C WITHOUT HEPATIC COMA: Primary | ICD-10-CM

## 2018-02-27 LAB
HCV LOG: 6.76 LOG (10) IU/ML
HCV RNA QUANT PCR: ABNORMAL IU/ML
HCV, QUALITATIVE: DETECTED IU/ML

## 2018-02-27 NOTE — TELEPHONE ENCOUNTER
Please call patient to let her know that the repeat testing did confirm the diagnosis of hepatitis C. The other tests for hepatitis B and HIV were negative, so no other infection has been identified. I have placed the referral to hepatology.

## 2018-03-02 ENCOUNTER — TELEPHONE (OUTPATIENT)
Dept: FAMILY MEDICINE | Facility: CLINIC | Age: 58
End: 2018-03-02

## 2018-03-02 NOTE — TELEPHONE ENCOUNTER
----- Message from Laina Mayo sent at 3/2/2018 10:06 AM CST -----  Contact: Patient  Patient is returning phone call from doctors office.  Call placed to pod, no answer.  Call Back#She stated that you have her number  Thanks

## 2018-03-02 NOTE — TELEPHONE ENCOUNTER
Spoke with pt and informed her of results per Dr. Márquez that repeat testing did confirm the diagnosis of hepatitis C. The other tests for hepatitis B and HIV were negative, so no other infection has been identified. Pt was informed that a referral for hepatology has been placed and pt was given contact number to schedule [phone].    Pt verbally understands.

## 2018-03-03 ENCOUNTER — DOCUMENTATION ONLY (OUTPATIENT)
Dept: TRANSPLANT | Facility: CLINIC | Age: 58
End: 2018-03-03

## 2018-03-03 NOTE — LETTER
March 3, 2018    Mich Knight  [address]  [place] [address]      Dear Mich Knight:    Your doctor has referred you to the [facility]. You will be contacted by our office and an initial appointment will then be scheduled for you.    We look forward to seeing you soon. If you have any further questions, please contact us at [phone].       Sincerely,        [facility]   [address] [place] [id number]  [phone]

## 2018-03-03 NOTE — NURSING
Pt records reviewed.   Pt will be referred to Hepatitis C clinic    Initial referral received  from the [facility]que.   Referring Provider/diagnosis  JANNET CLEMENTE Provider: Jannet Clemente MD   Diagnosis: Chronic hepatitis C without hepatic coma          Referral letter sent to provider and patient.

## 2018-03-05 ENCOUNTER — PATIENT OUTREACH (OUTPATIENT)
Dept: ADMINISTRATIVE | Facility: HOSPITAL | Age: 58
End: 2018-03-05

## 2018-03-05 NOTE — TELEPHONE ENCOUNTER
Spoke with pt and informed her of results and that she needs to contact Hepatology for eval.     Pt verbally understands and will contact [phone] to schedule.

## 2018-03-05 NOTE — LETTER
March 5, 2018    Mich Knight  [address] [place]  [place] [address]             [facility]  [address]  [facility] [address]  Phone: [phone] Dear Mrs. Knight:    [facility] is committed to your overall health.  To help you get the most out of each of your visits, we will review your information to make sure you are up to date on all of your recommended tests and/or procedures.      Dr. Jannet Márquez has found that your chart shows you may be due for your annual diabetic eye exam, pap smear, mammogram, colon cancer screening, and possibly some immunizations (tetanus, pneumonia, and flu).     If you have not had an eye exam in the past year, we now have a  Retinal Camera at the [facility].  If we do this exam the same day you see a member of your Primary Care team, we can save you from having to pay a second co pay.      Medicare does not cover all immunizations to be given in the clinic.  Check your benefits to ensure that you do not need to receive your immunizations at the pharmacy.    If you have had any of the above done at another facility, please bring the records or information with you so that your record at [facility] will be complete.  If you would like to schedule any of these, please contact me.    If you are currently taking medication, please bring it with you to your appointment for review.    If you have any questions or concerns, please don't hesitate to call.    Thank you for letting us care for you,  Dulce Maria Rock LPN Clinical Care Coordinator  [facility] [place] [facility] [place]  [phone]

## 2018-03-06 ENCOUNTER — TELEPHONE (OUTPATIENT)
Dept: HEPATOLOGY | Facility: CLINIC | Age: 58
End: 2018-03-06

## 2018-03-06 NOTE — TELEPHONE ENCOUNTER
Dr. Márquez ordered that patient be scheduled for hepatology consult.  Attempt made to scheduled patient to see PA Scheuermann. LVM and sent letter asking patient to call us for scheduling.

## 2018-03-08 ENCOUNTER — TELEPHONE (OUTPATIENT)
Dept: HEPATOLOGY | Facility: CLINIC | Age: 58
End: 2018-03-08

## 2018-03-08 NOTE — TELEPHONE ENCOUNTER
PA Scheuermann not in clinic on 5/22/18.  Attempt made to reach patient.  LVM that appt with provider moved to 5/15/18; appt notice mailed.

## 2018-03-14 ENCOUNTER — OFFICE VISIT (OUTPATIENT)
Dept: PAIN MEDICINE | Facility: CLINIC | Age: 58
End: 2018-03-14
Payer: MEDICARE

## 2018-03-14 VITALS
WEIGHT: 149 LBS | HEART RATE: 101 BPM | BODY MASS INDEX: 26.4 KG/M2 | DIASTOLIC BLOOD PRESSURE: 90 MMHG | SYSTOLIC BLOOD PRESSURE: 148 MMHG | HEIGHT: 63 IN

## 2018-03-14 DIAGNOSIS — M54.16 LUMBAR RADICULOPATHY: Primary | ICD-10-CM

## 2018-03-14 DIAGNOSIS — M46.1 SACROILIITIS: ICD-10-CM

## 2018-03-14 DIAGNOSIS — M51.36 DDD (DEGENERATIVE DISC DISEASE), LUMBAR: ICD-10-CM

## 2018-03-14 DIAGNOSIS — M79.18 MYOFASCIAL PAIN: ICD-10-CM

## 2018-03-14 PROCEDURE — 99214 OFFICE O/P EST MOD 30 MIN: CPT | Mod: S$PBB,,, | Performed by: PHYSICIAN ASSISTANT

## 2018-03-14 PROCEDURE — 99999 PR PBB SHADOW E&M-EST. PATIENT-LVL III: CPT | Mod: PBBFAC,,, | Performed by: PHYSICIAN ASSISTANT

## 2018-03-14 PROCEDURE — 99213 OFFICE O/P EST LOW 20 MIN: CPT | Mod: PBBFAC,PO | Performed by: PHYSICIAN ASSISTANT

## 2018-03-14 RX ORDER — GLIPIZIDE 10 MG/1
TABLET ORAL
COMMUNITY
Start: 2018-02-28 | End: 2018-03-19 | Stop reason: SDUPTHER

## 2018-03-14 NOTE — PROGRESS NOTES
Referring Physician: No ref. provider found    PCP: Jannet Márquez MD    CC:R low back and R leg pain    Interval History:  Mich Knight is a 57 y.o. female with low back pain who presents today for f/u s/p right SIJ injection. Reports very good relief for almost 2 weeks. Pain then returned to baseline. She has had moderate benefit in the past from right L5 TFESIs at a facility in [place] for 3 months at a time. She would like to scheduled a TFESI. She would like TPIs today. Pain begins in her low back, radiates primarily to her R buttock and down the leg to above the knee. She has concomitant diabetic neuropathy. The pain is described as throbbing, shooting, deep, exacerbated by standing, sitting, bending, and walking. She denies any leg weakness. No bowel or bladder incontinence. She takes Gabapentin 200 mg TID without benefit or SEs. She takes Tramadol BID with moderate benefit. This was prescribed in [place]. She rates her pain as a 10/10.   Pt has been seen in the clinic before, however pt is new to me.     History below per Dr. Knowles    HPI:   Mich Knight is a 57 y.o. female with a history of low back pain who recently moved down from [place]. Within the last 2 years she has had progression of low back pain and was seeing a PM&R physician in [place] (Dr. Ramu Lugo in [place]). She was receiving epidurals every 4 months, which she states was helping her pain, with TPIs every month in between. The pain begins in her low back, radiates primarily to her R buttock and down the leg to above the knee. She rarely has pain that goes past her R knee. She has concomitant diabetic neuropathy for which she recently increased her gabapentin dose. The pain as described as throbbing, shooting, deep, exacerbated by standing, sitting, bending, and walking. She denies any leg weakness. No bowel or bladder incontinence. She rates her pain as a 10 today.     ROS:  CONSTITUTIONAL: No fevers, chills, night sweats, wt. loss, appetite  "changes  SKIN: no rashes or itching  ENT: No headaches, head trauma, vision changes, or eye pain  LYMPH NODES: None noticed   CV: No chest pain, palpitations.   RESP: No shortness of breath, dyspnea on exertion, cough, wheezing, or hemoptysis  GI: No nausea, emesis, diarrhea, constipation, melena, hematochezia, pain.    : No dysuria, hematuria, urgency, or frequency   HEME: No easy bruising, bleeding problems  PSYCHIATRIC: No anxiety, psychosis, hallucinations. +ve depression  NEURO: No seizures, memory loss, dizziness or difficulty sleeping  MSK: +ve per HPI      Past Medical History:   Diagnosis Date    DDD (degenerative disc disease), lumbar     Diabetes mellitus     Essential tremor     HLD (hyperlipidemia)     HTN (hypertension)      Past Surgical History:   Procedure Laterality Date    CHOLECYSTECTOMY       Family History   Problem Relation Age of Onset    Heart disease Mother     Diabetes type II Father     Dementia Father     Heart disease Father      Social History     Social History    Marital status:      Spouse name: N/A    Number of children: N/A    Years of education: N/A     Social History Main Topics    Smoking status: Current Every Day Smoker     Packs/day: 0.50     Years: 40.00    Smokeless tobacco: Never Used    Alcohol use 0.0 - 3.0 oz/week    Drug use: No    Sexual activity: Not Currently     Other Topics Concern    None     Social History Narrative    Does not work, reports being disabled due to back pain. Previously was .          Medications/Allergies: See med card    Vitals:    03/14/18 0831   BP: (!) 148/90   Pulse: 101   Weight: 67.6 kg (149 lb)   Height: 5' 3" (1.6 m)   PainSc: 10-Worst pain ever   PainLoc: Back         Physical exam:    GENERAL: A and O x3, the patient appears well groomed and is in no acute distress.  Skin: No rashes or obvious lesions  HEENT: normocephalic, atraumatic  CARDIOVASCULAR:  tachycardia  LUNGS: non labored " breathing  ABDOMEN: soft, nontender   UPPER EXTREMITIES: Normal alignment, normal range of motion, no atrophy, no skin changes,  hair growth and nail growth normal and equal bilaterally. No swelling, no tenderness.    LOWER EXTREMITIES:  Normal alignment, normal range of motion, no atrophy, no skin changes,  hair growth and nail growth normal and equal bilaterally. No swelling, no tenderness.    LUMBAR SPINE  Lumbar spine: ROM is full with flexion extension and oblique extension with no increased pain.    Levi's test causes increased pain on R side with bilateral maneuvers  Supine straight leg raise is negative bilaterally.    Internal and external rotation of the hip causes no increased pain on either side.  Myofascial exam: tenderness with palpation along lower lumbar region, +ve tenderness along R SI joint      MENTAL STATUS: normal orientation, speech, language, and fund of knowledge for social situation.  Emotional state appropriate.    CRANIAL NERVES:  II:  PERRL bilaterally,   III,IV,VI: EOMI.    V:  Facial sensation equal bilaterally  VII:  Facial motor function normal.  VIII:  Hearing equal to finger rub bilaterally  IX/X: Gag normal, palate symmetric  XI:  Shoulder shrug equal, head turn equal  XII:  Tongue midline without fasciculations      MOTOR: Tone and bulk: normal bilateral upper and lower Strength: normal   Delt Bi Tri WE WF     R 5 5 5 5 5 5   L 5 5 5 5 5 5     IP ADD ABD Quad TA Gas HAM  R 4 4 4 4 4 4 4  L 4 4 4 4 4 4 4    SENSATION: Light touch and pinprick intact bilaterally  REFLEXES: normal, symmetric, nonbrisk.  Toes down, no clonus. No hoffmans.  GAIT: normal rise, base, steps, and arm swing.        Imaging:  MRI L-spine 9/2016        L4-5, moderate disc desiccation.  Concentric disc bulge with small central protrusion.  Left greater than right subarticular protrusion.  Moderate bilateral facet arthropathy.  Mild central canal stenosis.  Moderate advanced left foraminal  stenosis.  L5-S1 advanced disc desiccation loss of disc height.  Broad-based disc protrusion to the right.  Moderate bilateral facet arthropathy.  There is advanced right foraminal stenosis.  A minimal left foraminal stenosis.    Assessment:  56 yo female with low back pain radiating to R leg - maneuvers and history suggestive of SI joint disease  1. Lumbar radiculopathy    2. Sacroiliitis    3. DDD (degenerative disc disease), lumbar    4. Myofascial pain        Plan:  1. I have stressed the importance of physical activity and exercise to improve overall health  2. Reviewed past records  3. Dr. Knowles performed TPIs to help with myofascial pain  4. Schedule lumbar TFESI on right at L5. I have explained the risks, benefits, and alternatives of the procedure in detail. The patient voices understanding and all questions have been answered. The patient agrees to proceed as planned. Written Consent obtained.   5. Consider L5, S1, S2 MB Block if SIJ pain continues  6. Increase Gabapentin to 300 mg TID. Will titrate further as tolerated  7. F/u s/p MARILIA

## 2018-03-19 ENCOUNTER — OFFICE VISIT (OUTPATIENT)
Dept: FAMILY MEDICINE | Facility: CLINIC | Age: 58
End: 2018-03-19
Payer: MEDICARE

## 2018-03-19 ENCOUNTER — TELEPHONE (OUTPATIENT)
Dept: ADMINISTRATIVE | Facility: HOSPITAL | Age: 58
End: 2018-03-19

## 2018-03-19 VITALS
DIASTOLIC BLOOD PRESSURE: 80 MMHG | HEART RATE: 88 BPM | HEIGHT: 63 IN | WEIGHT: 146.81 LBS | TEMPERATURE: 98 F | BODY MASS INDEX: 26.01 KG/M2 | SYSTOLIC BLOOD PRESSURE: 122 MMHG

## 2018-03-19 DIAGNOSIS — Z72.0 TOBACCO ABUSE: ICD-10-CM

## 2018-03-19 DIAGNOSIS — Z00.00 PREVENTATIVE HEALTH CARE: ICD-10-CM

## 2018-03-19 DIAGNOSIS — E11.42 TYPE 2 DIABETES MELLITUS WITH DIABETIC POLYNEUROPATHY, WITHOUT LONG-TERM CURRENT USE OF INSULIN: Primary | ICD-10-CM

## 2018-03-19 DIAGNOSIS — G89.4 CHRONIC PAIN SYNDROME: ICD-10-CM

## 2018-03-19 DIAGNOSIS — I10 ESSENTIAL HYPERTENSION: ICD-10-CM

## 2018-03-19 DIAGNOSIS — E11.29 TYPE 2 DIABETES MELLITUS WITH MICROALBUMINURIA, WITHOUT LONG-TERM CURRENT USE OF INSULIN: ICD-10-CM

## 2018-03-19 DIAGNOSIS — E78.5 HYPERLIPIDEMIA, UNSPECIFIED HYPERLIPIDEMIA TYPE: ICD-10-CM

## 2018-03-19 DIAGNOSIS — B18.2 HEP C W/O COMA, CHRONIC: ICD-10-CM

## 2018-03-19 DIAGNOSIS — R80.9 TYPE 2 DIABETES MELLITUS WITH MICROALBUMINURIA, WITHOUT LONG-TERM CURRENT USE OF INSULIN: ICD-10-CM

## 2018-03-19 PROCEDURE — 99214 OFFICE O/P EST MOD 30 MIN: CPT | Mod: S$PBB,,, | Performed by: INTERNAL MEDICINE

## 2018-03-19 PROCEDURE — 99213 OFFICE O/P EST LOW 20 MIN: CPT | Mod: PBBFAC,PN | Performed by: INTERNAL MEDICINE

## 2018-03-19 PROCEDURE — 99999 PR PBB SHADOW E&M-EST. PATIENT-LVL III: CPT | Mod: PBBFAC,,, | Performed by: INTERNAL MEDICINE

## 2018-03-19 NOTE — Clinical Note
Pap and mammogram were with [facility] in [facility]. Colonoscopy would have been with Dr. Karla Lora also in [facility]. We had requested results previously from Dr. Lora and did not get them. Patient is amenable to stool test if we can't get the colonoscopy.

## 2018-03-19 NOTE — LETTER
March 19, 2018     [facility]   [place], [place] [facility]  [address]  [facility] [address]  Phone: [phone] March 19, 2018     Patient: Mich Knight    YOB: 1960   Date of Visit: 3/19/2018       To Whom It May Concern:    We are seeing Mich Knight in the clinic at [facility].  Jannet Márquez MD is their PCP.  She has an outstanding lab/procedure at the time we reviewed their chart.  To help with our Health Maintenance records will you please supply the following report(s):     [x]  Mammogram                                                []  Colonoscopy   [x]  Pap Smear                                                  []  Outside Lab Results   []  Dexa scans                                                   []  Eye Exam   []  Foot Exam                                                     [] Other___________   []  Outside Immunizations                             Please Fax to [facility] at .    Thank you for your help.  If my Care Coordinator can be of any assistance, you can call CHANEL Spicer at [phone].     If you have any questions or concerns, please don't hesitate to call.    Sincerely,        Jannet Márquez MD

## 2018-03-19 NOTE — PROGRESS NOTES
Assessment and Plan:    1. Type 2 diabetes mellitus with diabetic polyneuropathy, without long-term current use of insulin  2. Type 2 diabetes mellitus with microalbuminuria, without long-term current use of insulin  A1c shows decent control despite occasional high reported fasting BG at home. Notes that she has been trying to work on dietary changes since that time. Thinks that decreased stress since moving here has allowed her to better manage her diet  Last A1c: 7.0  Foot exam: Completed 1/29/18, had decreased protective sensation, no ulcers, referral placed for podiatry, advised to schedule this today  Eye exam: Due, previously ordered, advised to schedule this today  Nephropathy screening: Elevated with microalb:Cr 74.6 on 2/20/18, will continue to monitor HTN as well and consider increased dose of lisinopril  Lipids: On atorvastatin  Medications: Continue metformin and glipizide for now  We discussed the role of diet and exercise in managing diabetes at this visit.     3. Essential hypertension  Controlled today, continue same medications.     4. Hyperlipidemia, unspecified hyperlipidemia type  Continue atorvastatin. Lipid panel UTD and shows likely compliance.     5. Tobacco abuse  Not at all interested in quitting.     6. Chronic pain syndrome  Continue gabapentin, continue to see pain management    7. Hep C  Has been scheduled for apt with Heptology PA on 5/15/18    8. Preventative health care  Colonoscopy was reportedly 2 years ago, have requested this, patient agreeable to [employer] next visit if we can't get the records  Last mammogram was 1 year ago, have always been normal (records requested)  Pap was 2 years ago, not sure of the name of her OB/Gyn but today we found the clinic name and will request this  ______________________________________________________________________  Subjective:    Chief Complaint:  Follow up chronic medical conditions    HPI:  Mich is a 57 y.o. year old woman here to follow up  DM, HTN, Hep C, chronic pain, and tobacco abuse.     HTN- Takes lisinopril/HCTZ. Notes that she has been taking the medications regularly, no problems with this. Notes that she has not been checking BP at home. Typically controlled when she is at the doctor.    DM2- Notes that she has been working on trying to avoid high sugar foods, but still eats some sweets when she has cravings. Taking metformin 1000 mg BID and glipizide 5 mg daily. Checks her BG at home and reports that it has been below 200, but has been occasionally up as high as th 170s.     HLD- Takes atorvastatin, no difficulty with that.     Chronic pain- Notes that she has increased the gabapentin dose as prescribed by pain management. Has an MARILIA scheduled for next week.     Hep C- Recent diagnosis, has apt set up to see Hepatology in May    Tobacco abuse- Previously reported no interest whatsoever in quitting. Notes today again that she has no interest in quitting.     Medications:  Current Outpatient Prescriptions on File Prior to Visit   Medication Sig Dispense Refill    aspirin (ECOTRIN) 81 MG EC tablet Take 81 mg by mouth once daily.      atorvastatin (LIPITOR) 20 MG tablet Take 20 mg by mouth once daily.      gabapentin (NEURONTIN) 100 MG capsule Take 2 capsules (200 mg total) by mouth 3 (three) times daily. 180 capsule 1    glipiZIDE (GLUCOTROL) 10 MG TR24 Take 5 mg by mouth daily with breakfast.      lisinopril-hydrochlorothiazide (PRINZIDE,ZESTORETIC) 20-12.5 mg per tablet Take 1 tablet by mouth once daily.      metFORMIN (GLUCOPHAGE) 1000 MG tablet Take 1,000 mg by mouth 2 (two) times daily with meals.      traMADol (ULTRAM) 50 mg tablet Take 50 mg by mouth every 12 (twelve) hours as needed for Pain.      [DISCONTINUED] glipiZIDE (GLUCOTROL) 10 MG tablet        No current facility-administered medications on file prior to visit.        Review of Systems:  Review of Systems   Constitutional: Negative for chills and fever.   Respiratory:  "Negative for shortness of breath and wheezing.    Cardiovascular: Negative for chest pain and leg swelling.   Gastrointestinal: Negative for constipation and diarrhea.   Musculoskeletal: Positive for back pain and gait problem.   Neurological: Negative for seizures and syncope.       Past Medical History:  Past Medical History:   Diagnosis Date    DDD (degenerative disc disease), lumbar     Diabetes mellitus     Essential tremor     HLD (hyperlipidemia)     HTN (hypertension)        Objective:    Vitals:  Vitals:    03/19/18 0926   BP: 122/80   Pulse: 88   Temp: 98.4 °F (36.9 °C)   Weight: 66.6 kg (146 lb 13.2 oz)   Height: 5' 3" (1.6 m)   PainSc: 10-Worst pain ever   PainLoc: Back       Physical Exam   Constitutional: She is oriented to person, place, and time. She appears well-developed and well-nourished. No distress.   HENT:   Mouth/Throat: Oropharynx is clear and moist.   Eyes: No scleral icterus.   Cardiovascular: Normal rate and regular rhythm.    No murmur heard.  Pulmonary/Chest: Effort normal and breath sounds normal. No respiratory distress. She has no wheezes.   Musculoskeletal: She exhibits no edema.   Neurological: She is alert and oriented to person, place, and time.   Skin: Skin is warm and dry.   Psychiatric: She has a normal mood and affect. Her behavior is normal.   Vitals reviewed.      Data:    Labs reviewed, borderline anemia with normal MCV, A1c 7.0, elevated microalb:Cr, elevated TG, normal TSH    Jannet Márquez MD  Internal Medicine  "

## 2018-03-19 NOTE — PATIENT INSTRUCTIONS
Next visit, bring a written log of your blood sugar and blood pressure.     Do not forget about the Liver Clinic visit on May 15th.

## 2018-03-19 NOTE — LETTER
May 1, 2018    Mich Knight  [address] [facility] [address]             [facility]  [address]  [facility] [address]  Phone: [phone] Dear Mrs. Knight:    Dr. Márquez had me request your colonoscopy records from Dr. Lora, but I have yet to receive anything from them.  Sometimes these facilities require a signed release before they will share records.  If you have already signed a Release of Information form, would you mind contacting them to send us the records.  If not, the next time you are in the office, we will have you sign a Release of Information and try again.        If you have any questions or concerns, please don't hesitate to call.    Sincerely,        Stephania Heredia LPN

## 2018-03-19 NOTE — LETTER
March 22, 2018    [facility]               [facility]  [address]  [facility] [address]  Phone: [phone] March 22, 2018     Patient: Mich Knight    YOB: 1960   Date of Visit: 3/19/2018       To Whom It May Concern:    We are seeing Mich Knight in the clinic at [facility].  Jannet Márquez MD is their PCP.  She has an outstanding lab/procedure at the time we reviewed their chart.  To help with our Health Maintenance records will you please supply the following report(s):     [x]  Mammogram                                                [x]  Colonoscopy   [x]  Pap Smear                                                  []  Outside Lab Results   []  Dexa scans                                                   []  Eye Exam   []  Foot Exam                                                     [] Other___________   []  Outside Immunizations                             Please Fax to [facility] at .    Thank you for your help.  If my Care Coordinator can be of any assistance, you can call CHANEL Spicer at [phone].     This is the second attempt to acquire these records.    If you have any questions or concerns, please don't hesitate to call.    Sincerely,        Jannet Márquez MD

## 2018-03-19 NOTE — TELEPHONE ENCOUNTER
----- Message from Jannet Márquez MD sent at 3/19/2018  9:55 AM CDT -----  Pap and mammogram were with [facility] in [facility]. Colonoscopy would have been with Dr. Karla Lora also in [facility]. We had requested results previously from Dr. Lora and did not get them. Patient is amenable to stool test if we can't get the colonoscopy.     [address], [place] [address] [phone]  Fax  or [phone]

## 2018-03-20 DIAGNOSIS — M54.16 LUMBAR RADICULOPATHY: Primary | ICD-10-CM

## 2018-03-27 ENCOUNTER — HOSPITAL ENCOUNTER (OUTPATIENT)
Facility: AMBULARY SURGERY CENTER | Age: 58
Discharge: HOME | End: 2018-03-27
Attending: ANESTHESIOLOGY | Admitting: ANESTHESIOLOGY
Payer: MEDICARE

## 2018-03-27 ENCOUNTER — SURGERY (OUTPATIENT)
Age: 58
End: 2018-03-27

## 2018-03-27 VITALS
HEART RATE: 76 BPM | DIASTOLIC BLOOD PRESSURE: 86 MMHG | RESPIRATION RATE: 18 BRPM | HEIGHT: 63 IN | TEMPERATURE: 98 F | WEIGHT: 146 LBS | SYSTOLIC BLOOD PRESSURE: 135 MMHG | BODY MASS INDEX: 25.87 KG/M2 | OXYGEN SATURATION: 97 %

## 2018-03-27 DIAGNOSIS — M54.16 LUMBAR RADICULITIS: ICD-10-CM

## 2018-03-27 DIAGNOSIS — M51.36 DDD (DEGENERATIVE DISC DISEASE), LUMBAR: Primary | ICD-10-CM

## 2018-03-27 LAB — GLUCOSE SERPL-MCNC: 151 MG/DL (ref 70–110)

## 2018-03-27 PROCEDURE — 62323 NJX INTERLAMINAR LMBR/SAC: CPT | Performed by: ANESTHESIOLOGY

## 2018-03-27 PROCEDURE — 99152 MOD SED SAME PHYS/QHP 5/>YRS: CPT | Mod: ,,, | Performed by: ANESTHESIOLOGY

## 2018-03-27 PROCEDURE — 62323 NJX INTERLAMINAR LMBR/SAC: CPT | Mod: ,,, | Performed by: ANESTHESIOLOGY

## 2018-03-27 PROCEDURE — G8907 PT DOC NO EVENTS ON DISCHARG: HCPCS | Performed by: ANESTHESIOLOGY

## 2018-03-27 RX ORDER — BUPIVACAINE HYDROCHLORIDE 2.5 MG/ML
INJECTION, SOLUTION EPIDURAL; INFILTRATION; INTRACAUDAL
Status: DISCONTINUED | OUTPATIENT
Start: 2018-03-27 | End: 2018-03-27 | Stop reason: HOSPADM

## 2018-03-27 RX ORDER — LIDOCAINE HYDROCHLORIDE 10 MG/ML
INJECTION, SOLUTION EPIDURAL; INFILTRATION; INTRACAUDAL; PERINEURAL
Status: DISCONTINUED | OUTPATIENT
Start: 2018-03-27 | End: 2018-03-27 | Stop reason: HOSPADM

## 2018-03-27 RX ORDER — SODIUM CHLORIDE, SODIUM LACTATE, POTASSIUM CHLORIDE, CALCIUM CHLORIDE 600; 310; 30; 20 MG/100ML; MG/100ML; MG/100ML; MG/100ML
INJECTION, SOLUTION INTRAVENOUS ONCE AS NEEDED
Status: DISCONTINUED | OUTPATIENT
Start: 2018-03-27 | End: 2018-03-27 | Stop reason: HOSPADM

## 2018-03-27 RX ORDER — ALPRAZOLAM 0.25 MG/1
1 TABLET ORAL ONCE
Status: COMPLETED | OUTPATIENT
Start: 2018-03-27 | End: 2018-03-27

## 2018-03-27 RX ORDER — DEXAMETHASONE SODIUM PHOSPHATE 10 MG/ML
INJECTION INTRAMUSCULAR; INTRAVENOUS
Status: DISCONTINUED | OUTPATIENT
Start: 2018-03-27 | End: 2018-03-27 | Stop reason: HOSPADM

## 2018-03-27 RX ADMIN — LIDOCAINE HYDROCHLORIDE 5 ML: 10 INJECTION, SOLUTION EPIDURAL; INFILTRATION; INTRACAUDAL; PERINEURAL at 12:03

## 2018-03-27 RX ADMIN — DEXAMETHASONE SODIUM PHOSPHATE 10 MG: 10 INJECTION INTRAMUSCULAR; INTRAVENOUS at 12:03

## 2018-03-27 RX ADMIN — BUPIVACAINE HYDROCHLORIDE 3 ML: 2.5 INJECTION, SOLUTION EPIDURAL; INFILTRATION; INTRACAUDAL at 12:03

## 2018-03-27 RX ADMIN — ALPRAZOLAM 1 MG: 0.25 TABLET ORAL at 11:03

## 2018-03-27 NOTE — PLAN OF CARE
Patient sitting in chair states she is ready to go home. Denies pain nausea or any limb weakness. Ambulated to waiting vehicle accompanied by nurse where friend Teri was who states she  is driving the patient home was waiting. Discharge instructions given  And discussed with both pt and friend;both patient and friend able to teach back discharge instructions.

## 2018-03-27 NOTE — OP NOTE
PROCEDURE DATE: 3/27/2018    Procedure:   Interlaminar epidural steroid injection at L4-5 under fluoroscopic guidance.    Diagnosis: lUMBAR DISC DISPLACEMENT WITHOUT MYELOPATHY  pOSTOP DIAGNOSIS: sAME    Physician: Charles Knowles M.D.    Medications injected:10 mg dexamethasone with 4 ml of preservative free NaCl    Local anesthetic injected:    Lidocaine 1% 2 ml total    Sedation Medications: RN IV sedation    Estimated blood loss:  None    Complications:  Unable to access right L5 transforaminal foramen due to osteophytes.  Procedure changed to intralaminar approach to the right sided    Technique:  Time-out taken to identify patient and procedure prior to starting the procedure.  With the patient laying in a prone position, the area was prepped and draped in the usual sterile fashion using ChloraPrep and a fenestrated drape.  After determining the target level with an AP fluoroscopic view, local anesthetic was given using a 25-gauge 1.5 inch needle by raising a wheal and then infiltrating toward the interlaminar entry space.  A 3.5 inch 20-gauge Touhy needle was introduced under AP fluoroscopic guidance to the interlaminar space of L4-5. Once the trajectory was established, the needle was visualized in the lateral view and advanced using loss of resistance technique. Once in the desired position, 1ml contrast was injected to confirm placement and there was no vascular uptake nor intrathecal spread.  The medication was then injected slowly. The patient tolerated the procedure well.      The patient was monitored after the procedure.   They were given post-procedure and discharge instructions to follow at home.  The patient was discharged in a stable condition.

## 2018-03-27 NOTE — H&P (VIEW-ONLY)
Referring Physician: No ref. provider found    PCP: Jannet Márquez MD    CC:R low back and R leg pain    Interval History:  Mich Knight is a 57 y.o. female with low back pain who presents today for f/u s/p right SIJ injection. Reports very good relief for almost 2 weeks. Pain then returned to baseline. She has had moderate benefit in the past from right L5 TFESIs at a facility in [place] for 3 months at a time. She would like to scheduled a TFESI. She would like TPIs today. Pain begins in her low back, radiates primarily to her R buttock and down the leg to above the knee. She has concomitant diabetic neuropathy. The pain is described as throbbing, shooting, deep, exacerbated by standing, sitting, bending, and walking. She denies any leg weakness. No bowel or bladder incontinence. She takes Gabapentin 200 mg TID without benefit or SEs. She takes Tramadol BID with moderate benefit. This was prescribed in [place]. She rates her pain as a 10/10.   Pt has been seen in the clinic before, however pt is new to me.     History below per Dr. Knowles    HPI:   Mich Knight is a 57 y.o. female with a history of low back pain who recently moved down from [place]. Within the last 2 years she has had progression of low back pain and was seeing a PM&R physician in [place] (Dr. Ramu Lugo in [place]). She was receiving epidurals every 4 months, which she states was helping her pain, with TPIs every month in between. The pain begins in her low back, radiates primarily to her R buttock and down the leg to above the knee. She rarely has pain that goes past her R knee. She has concomitant diabetic neuropathy for which she recently increased her gabapentin dose. The pain as described as throbbing, shooting, deep, exacerbated by standing, sitting, bending, and walking. She denies any leg weakness. No bowel or bladder incontinence. She rates her pain as a 10 today.     ROS:  CONSTITUTIONAL: No fevers, chills, night sweats, wt. loss, appetite  "changes  SKIN: no rashes or itching  ENT: No headaches, head trauma, vision changes, or eye pain  LYMPH NODES: None noticed   CV: No chest pain, palpitations.   RESP: No shortness of breath, dyspnea on exertion, cough, wheezing, or hemoptysis  GI: No nausea, emesis, diarrhea, constipation, melena, hematochezia, pain.    : No dysuria, hematuria, urgency, or frequency   HEME: No easy bruising, bleeding problems  PSYCHIATRIC: No anxiety, psychosis, hallucinations. +ve depression  NEURO: No seizures, memory loss, dizziness or difficulty sleeping  MSK: +ve per HPI      Past Medical History:   Diagnosis Date    DDD (degenerative disc disease), lumbar     Diabetes mellitus     Essential tremor     HLD (hyperlipidemia)     HTN (hypertension)      Past Surgical History:   Procedure Laterality Date    CHOLECYSTECTOMY       Family History   Problem Relation Age of Onset    Heart disease Mother     Diabetes type II Father     Dementia Father     Heart disease Father      Social History     Social History    Marital status:      Spouse name: N/A    Number of children: N/A    Years of education: N/A     Social History Main Topics    Smoking status: Current Every Day Smoker     Packs/day: 0.50     Years: 40.00    Smokeless tobacco: Never Used    Alcohol use 0.0 - 3.0 oz/week    Drug use: No    Sexual activity: Not Currently     Other Topics Concern    None     Social History Narrative    Does not work, reports being disabled due to back pain. Previously was .          Medications/Allergies: See med card    Vitals:    03/14/18 0831   BP: (!) 148/90   Pulse: 101   Weight: 67.6 kg (149 lb)   Height: 5' 3" (1.6 m)   PainSc: 10-Worst pain ever   PainLoc: Back         Physical exam:    GENERAL: A and O x3, the patient appears well groomed and is in no acute distress.  Skin: No rashes or obvious lesions  HEENT: normocephalic, atraumatic  CARDIOVASCULAR:  tachycardia  LUNGS: non labored " breathing  ABDOMEN: soft, nontender   UPPER EXTREMITIES: Normal alignment, normal range of motion, no atrophy, no skin changes,  hair growth and nail growth normal and equal bilaterally. No swelling, no tenderness.    LOWER EXTREMITIES:  Normal alignment, normal range of motion, no atrophy, no skin changes,  hair growth and nail growth normal and equal bilaterally. No swelling, no tenderness.    LUMBAR SPINE  Lumbar spine: ROM is full with flexion extension and oblique extension with no increased pain.    Levi's test causes increased pain on R side with bilateral maneuvers  Supine straight leg raise is negative bilaterally.    Internal and external rotation of the hip causes no increased pain on either side.  Myofascial exam: tenderness with palpation along lower lumbar region, +ve tenderness along R SI joint      MENTAL STATUS: normal orientation, speech, language, and fund of knowledge for social situation.  Emotional state appropriate.    CRANIAL NERVES:  II:  PERRL bilaterally,   III,IV,VI: EOMI.    V:  Facial sensation equal bilaterally  VII:  Facial motor function normal.  VIII:  Hearing equal to finger rub bilaterally  IX/X: Gag normal, palate symmetric  XI:  Shoulder shrug equal, head turn equal  XII:  Tongue midline without fasciculations      MOTOR: Tone and bulk: normal bilateral upper and lower Strength: normal   Delt Bi Tri WE WF     R 5 5 5 5 5 5   L 5 5 5 5 5 5     IP ADD ABD Quad TA Gas HAM  R 4 4 4 4 4 4 4  L 4 4 4 4 4 4 4    SENSATION: Light touch and pinprick intact bilaterally  REFLEXES: normal, symmetric, nonbrisk.  Toes down, no clonus. No hoffmans.  GAIT: normal rise, base, steps, and arm swing.        Imaging:  MRI L-spine 9/2016        L4-5, moderate disc desiccation.  Concentric disc bulge with small central protrusion.  Left greater than right subarticular protrusion.  Moderate bilateral facet arthropathy.  Mild central canal stenosis.  Moderate advanced left foraminal  stenosis.  L5-S1 advanced disc desiccation loss of disc height.  Broad-based disc protrusion to the right.  Moderate bilateral facet arthropathy.  There is advanced right foraminal stenosis.  A minimal left foraminal stenosis.    Assessment:  58 yo female with low back pain radiating to R leg - maneuvers and history suggestive of SI joint disease  1. Lumbar radiculopathy    2. Sacroiliitis    3. DDD (degenerative disc disease), lumbar    4. Myofascial pain        Plan:  1. I have stressed the importance of physical activity and exercise to improve overall health  2. Reviewed past records  3. Dr. Knowles performed TPIs to help with myofascial pain  4. Schedule lumbar TFESI on right at L5. I have explained the risks, benefits, and alternatives of the procedure in detail. The patient voices understanding and all questions have been answered. The patient agrees to proceed as planned. Written Consent obtained.   5. Consider L5, S1, S2 MB Block if SIJ pain continues  6. Increase Gabapentin to 300 mg TID. Will titrate further as tolerated  7. F/u s/p MARILIA

## 2018-03-27 NOTE — DISCHARGE SUMMARY
[facility]  Discharge Note  Short Stay    Admit Date: 3/27/2018    Discharge Date and Time: 3/27/2018    Attending Physician: Charles Knowles MD     Discharge Provider: Charles Knowles    Diagnoses:  Active Hospital Problems    Diagnosis  POA    *Lumbar radiculitis [M54.16]  Yes      Resolved Hospital Problems    Diagnosis Date Resolved POA   No resolved problems to display.       Hospital Course: Lumbar MARILIA  Discharged Condition: Good    Final Diagnoses:   Active Hospital Problems    Diagnosis  POA    *Lumbar radiculitis [M54.16]  Yes      Resolved Hospital Problems    Diagnosis Date Resolved POA   No resolved problems to display.       Disposition: Home or Self Care    Follow up/Patient Instructions:    Medications:  Reconciled Home Medications:      Medication List      CONTINUE taking these medications    aspirin 81 MG EC tablet  Commonly known as:  ECOTRIN     atorvastatin 20 MG tablet  Commonly known as:  LIPITOR     gabapentin 100 MG capsule  Commonly known as:  NEURONTIN  Take 2 capsules (200 mg total) by mouth 3 (three) times daily.     glipiZIDE 10 MG Tr24  Commonly known as:  GLUCOTROL     lisinopril-hydrochlorothiazide 20-12.5 mg per tablet  Commonly known as:  PRINZIDE,ZESTORETIC     metFORMIN 1000 MG tablet  Commonly known as:  GLUCOPHAGE     traMADol 50 mg tablet  Commonly known as:  ULTRAM            Discharge Procedure Orders  Call MD for:  temperature >100.4     Call MD for:  persistent nausea and vomiting or diarrhea     Call MD for:  severe uncontrolled pain     Call MD for:  redness, tenderness, or signs of infection (pain, swelling, redness, odor or green/yellow discharge around incision site)     Call MD for:  difficulty breathing or increased cough     Call MD for:  severe persistent headache          Follow up with MD in 2-3 weeks    Discharge Procedure Orders (must include Diet, Follow-up, Activity):    Discharge Procedure Orders (must include Diet, Follow-up, Activity)  Call MD for:   temperature >100.4     Call MD for:  persistent nausea and vomiting or diarrhea     Call MD for:  severe uncontrolled pain     Call MD for:  redness, tenderness, or signs of infection (pain, swelling, redness, odor or green/yellow discharge around incision site)     Call MD for:  difficulty breathing or increased cough     Call MD for:  severe persistent headache

## 2018-03-27 NOTE — DISCHARGE INSTRUCTIONS
Recovery After Procedural Sedation (Adult)  You have been given medicine by vein to make you sleep during your surgery. This may have included both a pain medicine and sleeping medicine. Most of the effects have worn off. But you may still have some drowsiness for the next 6 to 8 hours.  Home care  Follow these guidelines when you get home:  · For the next 8 hours, you should be watched by a responsible adult. This person should make sure your condition is not getting worse.  · Don't drink any alcohol for the next 24 hours.  · Don't drive, operate dangerous machinery, or make important business or personal decisions during the next 24 hours.  Note: Your healthcare provider may tell you not to take any medicine by mouth for pain or sleep in the next 4 hours. These medicines may react with the medicines you were given in the hospital. This could cause a much stronger response than usual.  Follow-up care  Follow up with your healthcare provider if you are not alert and back to your usual level of activity within 12 hours.  When to seek medical advice  Call your healthcare provider right away if any of these occur:  · Drowsiness gets worse  · Weakness or dizziness gets worse  · Repeated vomiting  · You can't be awakened   Date Last Reviewed: 10/18/2016  © 8363-2383 [employer]. [address] [place] [address], [place] [address]. All rights reserved. This information is not intended as a substitute for professional medical care. Always follow your healthcare professional's instructions.      Pain injection instructions:     Steroids take about a 2 weeks to relieve pain.  Initially you may get pain relief from the local anesthetic but this may wear off before the steroid works.    No driving for 24 hrs   Activity as tolerated- gradually increase activities.  Dont lift over 10 lbs for 24 hrs   No heat at injection sites x 2 days. NO hot tubs, swimming saunas or heating pads for 2 days.  Use ice pack for mild  swelling and for comfort at 20 minute intervals. Apply for 20 minutes and remove for 20 minutes. No direct contact of ice to skin.  May shower today. No tub baths for two days.      Resume Aspirin, Plavix, or Coumadin the day after the procedure unless otherwise instructed.   If diabetic,monitor your glucose carefully as steroids can increase glucose level    Seek immediate medical help for:   Severe increase in your usual pain or appearance of new pain.  Prolonged (mor than 8 hours) or increasing weakness or numbness in the legs or arms.    - Numbing medicine was injected that affects nerves that carry information from the muscles to brain and the brain to the muscles.  This numbness can last 6-8 hrs so be very careful and get assistance standing or walking.    Fever above 101 ,Drainage,redness,active bleeding, or increased swelling at the injection site.  Headache, shortness of breath, chest pain, or breathing problems.

## 2018-03-27 NOTE — INTERVAL H&P NOTE
The patient has been examined and the H&P has been reviewed:    I concur with the findings and no changes have occurred since H&P was written.   This patient has been cleared for surgery in an ambulatory surgical facility    ASA 3,  Mallampatti Score 3  No history of anesthetic complications  Plan for RN IV sedation      Anesthesia/Surgery risks, benefits and alternative options discussed and understood by patient/family.          Active Hospital Problems    Diagnosis  POA    Lumbar radiculitis [M54.16]  Yes      Resolved Hospital Problems    Diagnosis Date Resolved POA   No resolved problems to display.

## 2018-04-16 ENCOUNTER — OFFICE VISIT (OUTPATIENT)
Dept: OPTOMETRY | Facility: CLINIC | Age: 58
End: 2018-04-16
Payer: MEDICARE

## 2018-04-16 ENCOUNTER — OFFICE VISIT (OUTPATIENT)
Dept: PODIATRY | Facility: CLINIC | Age: 58
End: 2018-04-16
Payer: MEDICARE

## 2018-04-16 VITALS — WEIGHT: 145.94 LBS | BODY MASS INDEX: 25.86 KG/M2 | HEIGHT: 63 IN

## 2018-04-16 DIAGNOSIS — H55.09 ROTARY NYSTAGMUS: ICD-10-CM

## 2018-04-16 DIAGNOSIS — E11.51 TYPE II DIABETES MELLITUS WITH PERIPHERAL CIRCULATORY DISORDER: Primary | ICD-10-CM

## 2018-04-16 DIAGNOSIS — H52.4 MYOPIA WITH ASTIGMATISM AND PRESBYOPIA, BILATERAL: ICD-10-CM

## 2018-04-16 DIAGNOSIS — H52.203 MYOPIA WITH ASTIGMATISM AND PRESBYOPIA, BILATERAL: ICD-10-CM

## 2018-04-16 DIAGNOSIS — M51.36 DDD (DEGENERATIVE DISC DISEASE), LUMBAR: ICD-10-CM

## 2018-04-16 DIAGNOSIS — H43.393 VITREOUS FLOATERS, BILATERAL: ICD-10-CM

## 2018-04-16 DIAGNOSIS — E11.9 DIABETES MELLITUS TYPE 2 WITHOUT RETINOPATHY: Primary | ICD-10-CM

## 2018-04-16 DIAGNOSIS — Z13.5 GLAUCOMA SCREENING: ICD-10-CM

## 2018-04-16 DIAGNOSIS — H52.13 MYOPIA WITH ASTIGMATISM AND PRESBYOPIA, BILATERAL: ICD-10-CM

## 2018-04-16 PROCEDURE — 99999 PR PBB SHADOW E&M-EST. PATIENT-LVL III: CPT | Mod: PBBFAC,,, | Performed by: OPTOMETRIST

## 2018-04-16 PROCEDURE — 99999 PR PBB SHADOW E&M-EST. PATIENT-LVL III: CPT | Mod: PBBFAC,,, | Performed by: PODIATRIST

## 2018-04-16 PROCEDURE — 92004 COMPRE OPH EXAM NEW PT 1/>: CPT | Mod: S$PBB,,, | Performed by: OPTOMETRIST

## 2018-04-16 PROCEDURE — 99213 OFFICE O/P EST LOW 20 MIN: CPT | Mod: PBBFAC,27,PO | Performed by: OPTOMETRIST

## 2018-04-16 PROCEDURE — 99213 OFFICE O/P EST LOW 20 MIN: CPT | Mod: PBBFAC,PN | Performed by: PODIATRIST

## 2018-04-16 PROCEDURE — 92015 DETERMINE REFRACTIVE STATE: CPT | Mod: ,,, | Performed by: OPTOMETRIST

## 2018-04-16 PROCEDURE — 99202 OFFICE O/P NEW SF 15 MIN: CPT | Mod: S$PBB,,, | Performed by: PODIATRIST

## 2018-04-16 NOTE — LETTER
April 16, 2018      Jannet Márquez MD  [address]  [facility] [address]           [facility]  [address] [facility] [address] [place] [address]  Phone: [phone]  Fax: [phone]          Patient: Mich Knight   MR Number: [id number]   YOB: 1960   Date of Visit: 4/16/2018       Dear Dr. Jannet Márquez:    Thank you for referring Mich Knight to me for evaluation. Attached you will find relevant portions of my assessment and plan of care.    If you have questions, please do not hesitate to call me. I look forward to following Mich Knight along with you.    Sincerely,    NAMAN Russell, OD    Enclosure  CC:  No Recipients    If you would like to receive this communication electronically, please contact [email or web address] or [phone] to request more information on [employer] Link access.    For providers and/or their staff who would like to refer a patient to [facility], please contact us through our one-stop-shop provider referral line, [facility], at [phone].    If you feel you have received this communication in error or would no longer like to receive these types of communications, please e-mail [email or web address]

## 2018-04-16 NOTE — PROGRESS NOTES
HPI     Annual Exam    Additional comments: DLE x 18 mos (outside [facility])    ocular health exam              Diabetic Eye Exam    Additional comments: BSL fluctuates, uncontrolled           Blurred Vision    Additional comments: at both near & distance           Eye Pain    Additional comments: occasional sharp pains OD only -- 1x week           Comments   Hemoglobin A1C       Date                     Value               Ref Range             Status                02/20/2018               7.0 (H)             4.0 - 5.6 %           Final              Comment:    According to ADA guidelines, hemoglobin A1c <7.0% represents  optimal   control in non-pregnant diabetic patients. Different  metrics may apply to   specific patient populations.   Standards of Medical Care in   Diabetes-2016.  For the purpose of screening for the presence of   diabetes:  <5.7%     Consistent with the absence of diabetes  5.7-6.4%    Consistent with increasing risk for diabetes   (prediabetes)  >or=6.5%    Consistent with diabetes  Currently, no consensus exists for use of   hemoglobin A1c  for diagnosis of diabetes for children.  This Hemoglobin   A1c assay has significant interference with fetal   hemoglobin   (HbF).   The results are invalid for patients with abnormal amounts of   HbF,     including those with known Hereditary Persistence   of Fetal Hemoglobin.   Heterozygous hemoglobin variants (HbAS, HbAC,   HbAD, HbAE, HbA2) do not   significantly interfere with this assay;   however, presence of multiple   variants in a sample may impact the %   interference.    ----------      DM2 x 15 years  No report of retinopathy  Head tremors, congenital  Wears distance correction for night / driving       Last edited by NAMAN Russell, OD on 4/16/2018 10:04 AM. (History)        ROS     Positive for: Eyes    Negative for: Constitutional, Gastrointestinal, Neurological, Skin,   Genitourinary, Musculoskeletal, HENT, Endocrine, Cardiovascular,    Respiratory, Psychiatric, Allergic/Imm, Heme/Lymph    Last edited by NAMAN Russell, OD on 4/16/2018  9:57 AM. (History)        Assessment /Plan     For exam results, see Encounter Report.    Diabetes mellitus type 2 without retinopathy    Vitreous floaters, bilateral    Rotary nystagmus    Glaucoma screening    Myopia with astigmatism and presbyopia, bilateral      1. No ret/ no csme, gave info, control glucose, annual DFE  2. RD precautions given, reviewed  3. Congenital w/ essential tremors---good corrected [facility] OU  4. Not suspect  5. Updated specs for distance Rx only    Discussed and educated patient on current findings /plan.  RTC 1 year, prn if any changes / issues

## 2018-04-16 NOTE — LETTER
April 16, 2018      Jannet Márquez MD  [address]  [facility] [address]           [facility] [address] [facility] [address] [place] [address]  Phone: [phone]          Patient: Mich Knight   MR Number: [id number]   YOB: 1960   Date of Visit: 4/16/2018       Dear Dr. Jannet Márquez:    Thank you for referring Mich Knight to me for evaluation. Attached you will find relevant portions of my assessment and plan of care.    If you have questions, please do not hesitate to call me. I look forward to following Mich Knight along with you.    Sincerely,    Chinedu Landa, DPNADEEM    Enclosure  CC:  No Recipients    If you would like to receive this communication electronically, please contact [email or web address] or [phone] to request more information on [employer] Link access.    For providers and/or their staff who would like to refer a patient to [facility], please contact us through our one-stop-shop provider referral line, [facility], at [phone].    If you feel you have received this communication in error or would no longer like to receive these types of communications, please e-mail [email or web address]

## 2018-04-16 NOTE — PROGRESS NOTES
Subjective:      Patient ID: Mich Knight is a 57 y.o. female.    Chief Complaint: Diabetes Mellitus (PCP--3/19/18, A1c-7.0-2/20/18)    Mich is a 57 y.o. female who presents to the clinic upon referral from Dr. Márquez  for evaluation and treatment of diabetic feet. Mich has a past medical history of DDD (degenerative disc disease), lumbar; Diabetes mellitus; Essential tremor; HLD (hyperlipidemia); and HTN (hypertension). Patient relates no major problem with feet. Only complaints today consist of burning and tingling pain secondary to neuropathy and DDD. She is on gabapentin which helps some. She also get occasional ingrown nails but relates she usually takes care of them herself. No other pedal complaints.    PCP: Jannet Márquez MD    Date Last Seen by PCP: 3/19/18    Current shoe gear: high heels, she relates that she feels better in high heels and cannot stand flattening her feet.    Hemoglobin A1C   Date Value Ref Range Status   02/20/2018 7.0 (H) 4.0 - 5.6 % Final     Comment:     According to ADA guidelines, hemoglobin A1c <7.0% represents  optimal control in non-pregnant diabetic patients. Different  metrics may apply to specific patient populations.   Standards of Medical Care in Diabetes-2016.  For the purpose of screening for the presence of diabetes:  <5.7%     Consistent with the absence of diabetes  5.7-6.4%  Consistent with increasing risk for diabetes   (prediabetes)  >or=6.5%  Consistent with diabetes  Currently, no consensus exists for use of hemoglobin A1c  for diagnosis of diabetes for children.  This Hemoglobin A1c assay has significant interference with fetal   hemoglobin   (HbF). The results are invalid for patients with abnormal amounts of   HbF,   including those with known Hereditary Persistence   of Fetal Hemoglobin. Heterozygous hemoglobin variants (HbAS, HbAC,   HbAD, HbAE, HbA2) do not significantly interfere with this assay;   however, presence of multiple variants in a sample  may impact the %   interference.             Review of Systems   Constitution: Negative for chills and fever.   Cardiovascular: Positive for leg swelling. Negative for claudication.   Respiratory: Negative for shortness of breath.    Skin: Positive for nail changes. Negative for itching and rash.   Musculoskeletal: Positive for arthritis and back pain. Negative for muscle cramps, muscle weakness and myalgias.   Gastrointestinal: Negative for nausea and vomiting.   Neurological: Positive for numbness and paresthesias. Negative for focal weakness and loss of balance.           Objective:      Physical Exam   Constitutional: She is oriented to person, place, and time. She appears well-developed and well-nourished. No distress.   Cardiovascular:   Pulses:       Dorsalis pedis pulses are 2+ on the right side, and 2+ on the left side.        Posterior tibial pulses are 2+ on the right side, and 2+ on the left side.   < 3 sec capillary refill time to toes 1-5 bilateral. Toes and feet are warm to touch proximally with normal distal cooling b/l. There is no hair growth on the feet and toes b/l. There is mild edema b/l with varicosities present b/l.      Musculoskeletal:   Equinus noted b/l ankles with < 3 deg DF noted. MMT 5/5 in DF/PF/Inv/Ev resistance with no reproduction of pain in any direction. Passive range of motion of ankle and pedal joints is painless b/l.     Feet:   Right Foot:   Protective Sensation: 10 sites tested. 10 sites sensed.   Left Foot:   Protective Sensation: 10 sites tested. 10 sites sensed.   Neurological: She is alert and oriented to person, place, and time. She has normal strength. She displays no atrophy and no tremor. No sensory deficit. She exhibits normal muscle tone.   Negative tinel sign bilateral. Vibratory sensation intact bilateral   Skin: Skin is warm, dry and intact. No abrasion, no bruising, no burn, no ecchymosis, no laceration, no lesion, no petechiae and no rash noted. She is not  diaphoretic. No cyanosis or erythema. No pallor. Nails show no clubbing.   Skin is thin and atrophic bilateral.    Nails slightly thickened 1-5 bilateral, no ingrown nails noted today.   Psychiatric: She has a normal mood and affect. Her behavior is normal.             Assessment:       Encounter Diagnoses   Name Primary?    Type II diabetes mellitus with peripheral circulatory disorder Yes    DDD (degenerative disc disease), lumbar          Plan:       Mich was seen today for diabetes mellitus.    Diagnoses and all orders for this visit:    Type II diabetes mellitus with peripheral circulatory disorder    DDD (degenerative disc disease), lumbar      I counseled the patient on her conditions, their implications and medical management.    Shoe inspection. Diabetic Foot Education. Patient reminded of the importance of good nutrition and blood sugar control to help prevent podiatric complications of diabetes. Patient instructed on proper foot hygeine. We discussed wearing proper shoe gear, daily foot inspections, never walking without protective shoe gear, never putting sharp instruments to feet    Discussed using 600 mg alpha lipoic acid daily to help with the nerve pain    She will return in 1 year diabetic foot exam. Sooner PRN.    Chinedu Landa DPM

## 2018-04-18 ENCOUNTER — TELEPHONE (OUTPATIENT)
Dept: HEPATOLOGY | Facility: CLINIC | Age: 58
End: 2018-04-18

## 2018-04-18 NOTE — TELEPHONE ENCOUNTER
Attempt made to reach patient to possibly move her visit with PA Scheuermann to [place] and schedule a fibroscan the same day. LVM asking that she give us a call back.

## 2018-04-25 ENCOUNTER — OFFICE VISIT (OUTPATIENT)
Dept: PAIN MEDICINE | Facility: CLINIC | Age: 58
End: 2018-04-25
Payer: MEDICARE

## 2018-04-25 VITALS
DIASTOLIC BLOOD PRESSURE: 87 MMHG | WEIGHT: 145 LBS | HEART RATE: 88 BPM | SYSTOLIC BLOOD PRESSURE: 129 MMHG | HEIGHT: 63 IN | BODY MASS INDEX: 25.69 KG/M2

## 2018-04-25 DIAGNOSIS — M79.18 MYOFASCIAL PAIN: ICD-10-CM

## 2018-04-25 DIAGNOSIS — M46.1 SACROILIITIS: ICD-10-CM

## 2018-04-25 DIAGNOSIS — M79.10 MYALGIA: ICD-10-CM

## 2018-04-25 DIAGNOSIS — M54.16 LUMBAR RADICULOPATHY: Primary | ICD-10-CM

## 2018-04-25 DIAGNOSIS — M47.819 FACET ARTHROPATHY: ICD-10-CM

## 2018-04-25 PROCEDURE — 99999 PR PBB SHADOW E&M-EST. PATIENT-LVL III: CPT | Mod: PBBFAC,,, | Performed by: PHYSICIAN ASSISTANT

## 2018-04-25 PROCEDURE — 99213 OFFICE O/P EST LOW 20 MIN: CPT | Mod: S$PBB,,, | Performed by: PHYSICIAN ASSISTANT

## 2018-04-25 PROCEDURE — 99213 OFFICE O/P EST LOW 20 MIN: CPT | Mod: PBBFAC,PN | Performed by: PHYSICIAN ASSISTANT

## 2018-04-25 RX ORDER — GABAPENTIN 300 MG/1
300 CAPSULE ORAL 3 TIMES DAILY
Qty: 180 CAPSULE | Refills: 0 | Status: SHIPPED | OUTPATIENT
Start: 2018-04-25 | End: 2018-05-23 | Stop reason: SDUPTHER

## 2018-04-25 NOTE — PROGRESS NOTES
Referring Physician: No ref. provider found    PCP: Jannet Márquez MD    CC:R low back and R leg pain    Interval History:  Mich Knight is a 57 y.o. female with low back pain who presents today for f/u s/p right L4-5 TFESI. Reports 50% relief for 4 days then pain returned to baseline. She is very disappointed because in the past in [place] she had good relief for 3-4 months with each MARILIA. She is also s/p right SIJ injection that provided her with very good relief for almost 2 weeks. Pain then returned to baseline. She would like TPIs today. Pain begins in her low back, radiates primarily to her R buttock and down the leg to above the knee. She has concomitant diabetic neuropathy. The pain is described as throbbing, shooting, deep, exacerbated by standing, sitting, bending, and walking. She denies any leg weakness. No bowel or bladder incontinence. She takes Gabapentin 300 mg TID without benefit or SEs.  She rates her pain as a 10/10.     HPI:   Mich Knight is a 57 y.o. female with a history of low back pain who recently moved down from [place]. Within the last 2 years she has had progression of low back pain and was seeing a PM&R physician in [place] (Dr. Ramu Lugo in [place]). She was receiving epidurals every 4 months, which she states was helping her pain, with TPIs every month in between. The pain begins in her low back, radiates primarily to her R buttock and down the leg to above the knee. She rarely has pain that goes past her R knee. She has concomitant diabetic neuropathy for which she recently increased her gabapentin dose. The pain as described as throbbing, shooting, deep, exacerbated by standing, sitting, bending, and walking. She denies any leg weakness. No bowel or bladder incontinence. She rates her pain as a 10 today.     ROS:  CONSTITUTIONAL: No fevers, chills, night sweats, wt. loss, appetite changes  SKIN: no rashes or itching  ENT: No headaches, head trauma, vision changes, or eye pain  LYMPH  "NODES: None noticed   CV: No chest pain, palpitations.   RESP: No shortness of breath, dyspnea on exertion, cough, wheezing, or hemoptysis  GI: No nausea, emesis, diarrhea, constipation, melena, hematochezia, pain.    : No dysuria, hematuria, urgency, or frequency   HEME: No easy bruising, bleeding problems  PSYCHIATRIC: No anxiety, psychosis, hallucinations. +ve depression  NEURO: No seizures, memory loss, dizziness or difficulty sleeping  MSK: +ve per HPI      Past Medical History:   Diagnosis Date    DDD (degenerative disc disease), lumbar     Diabetes mellitus     Essential tremor     HLD (hyperlipidemia)     HTN (hypertension)      Past Surgical History:   Procedure Laterality Date    CHOLECYSTECTOMY       Family History   Problem Relation Age of Onset    Heart disease Mother     Macular degeneration Mother     Diabetes type II Father     Dementia Father     Heart disease Father      Social History     Social History    Marital status:      Spouse name: N/A    Number of children: N/A    Years of education: N/A     Social History Main Topics    Smoking status: Current Every Day Smoker     Packs/day: 0.50     Years: 40.00    Smokeless tobacco: Never Used    Alcohol use 0.0 - 3.0 oz/week    Drug use: No    Sexual activity: Not Currently     Other Topics Concern    None     Social History Narrative    Does not work, reports being disabled due to back pain. Previously was .          Medications/Allergies: See med card    Vitals:    04/25/18 0958   BP: 129/87   Pulse: 88   Weight: 65.8 kg (145 lb)   Height: 5' 3" (1.6 m)   PainSc: 10-Worst pain ever   PainLoc: Back         Physical exam:    GENERAL: A and O x3, the patient appears well groomed and is in no acute distress.  Skin: No rashes or obvious lesions  HEENT: normocephalic, atraumatic  CARDIOVASCULAR:  RRR  LUNGS: non labored breathing  ABDOMEN: soft, nontender   UPPER EXTREMITIES: Normal alignment, normal range of " motion, no atrophy, no skin changes,  hair growth and nail growth normal and equal bilaterally. No swelling, no tenderness.    LOWER EXTREMITIES:  Normal alignment, normal range of motion, no atrophy, no skin changes,  hair growth and nail growth normal and equal bilaterally. No swelling, no tenderness.    LUMBAR SPINE  Lumbar spine: ROM is full with flexion extension and oblique extension with  increased pain.    Levi's test causes increased pain on R side with bilateral maneuvers  Supine straight leg raise is negative bilaterally.    Internal and external rotation of the hip causes no increased pain on either side.  Myofascial exam: tenderness with palpation along lower lumbar region, +ve tenderness along R SI joint      MENTAL STATUS: normal orientation, speech, language, and fund of knowledge for social situation.  Emotional state appropriate.    CRANIAL NERVES:  II:  PERRL bilaterally,   III,IV,VI: EOMI.    V:  Facial sensation equal bilaterally  VII:  Facial motor function normal.  VIII:  Hearing equal to finger rub bilaterally  IX/X: Gag normal, palate symmetric  XI:  Shoulder shrug equal, head turn equal  XII:  Tongue midline without fasciculations      MOTOR: Tone and bulk: normal bilateral upper and lower Strength: normal   Delt Bi Tri WE WF     R 5 5 5 5 5 5   L 5 5 5 5 5 5     IP ADD ABD Quad TA Gas HAM  R 4 4 4 4 4 4 4  L 4 4 4 4 4 4 4    SENSATION: Light touch and pinprick intact bilaterally  REFLEXES: normal, symmetric, nonbrisk.  Toes down, no clonus. No hoffmans.  GAIT: normal rise, base, steps, and arm swing.        Imaging:  MRI L-spine 9/2016        L4-5, moderate disc desiccation.  Concentric disc bulge with small central protrusion.  Left greater than right subarticular protrusion.  Moderate bilateral facet arthropathy.  Mild central canal stenosis.  Moderate advanced left foraminal stenosis.  L5-S1 advanced disc desiccation loss of disc height.  Broad-based disc protrusion to the right.   Moderate bilateral facet arthropathy.  There is advanced right foraminal stenosis.  A minimal left foraminal stenosis.    Assessment:  56 yo female with low back pain radiating to R leg   1. Lumbar radiculopathy    2. Sacroiliitis    3. Myalgia    4. Myofascial pain    5. Facet arthropathy        Plan:  1. I have stressed the importance of physical activity and exercise to improve overall health  2. Continued low back pain may benefit from lumbar MBBs. She has know facet arthropathy at L4, 5  3. TPIs today to help with myofascial pain  4. Recommend repeat lumbar TFESI on right at L5  5. Consider L5, S1, S2 MB Block if SIJ pain continues  6. Increase Gabapentin to 600 mg TID.   7. F/u s/p MARILIA

## 2018-04-30 ENCOUNTER — TELEPHONE (OUTPATIENT)
Dept: PAIN MEDICINE | Facility: CLINIC | Age: 58
End: 2018-04-30

## 2018-04-30 NOTE — TELEPHONE ENCOUNTER
----- Message from Alyssia Benedict sent at 4/30/2018 10:57 AM CDT -----  Contact: Patient  Mich, patient [phone] calling to schedule a procedure with Dr. Knowles. Please advise. Thanks.

## 2018-04-30 NOTE — TELEPHONE ENCOUNTER
Patient scheduled for 5/21/18 per patient request. Instructions given. Patient accepted and voiced understanding.

## 2018-05-07 DIAGNOSIS — M51.36 DDD (DEGENERATIVE DISC DISEASE), LUMBAR: Primary | ICD-10-CM

## 2018-05-21 ENCOUNTER — SURGERY (OUTPATIENT)
Age: 58
End: 2018-05-21

## 2018-05-21 ENCOUNTER — HOSPITAL ENCOUNTER (OUTPATIENT)
Facility: AMBULARY SURGERY CENTER | Age: 58
Discharge: HOME | End: 2018-05-21
Attending: ANESTHESIOLOGY | Admitting: ANESTHESIOLOGY
Payer: MEDICARE

## 2018-05-21 DIAGNOSIS — M54.16 LUMBAR RADICULITIS: Primary | ICD-10-CM

## 2018-05-21 LAB — GLUCOSE SERPL-MCNC: 158 MG/DL (ref 70–110)

## 2018-05-21 PROCEDURE — 99152 MOD SED SAME PHYS/QHP 5/>YRS: CPT | Mod: ,,, | Performed by: ANESTHESIOLOGY

## 2018-05-21 PROCEDURE — 64483 NJX AA&/STRD TFRM EPI L/S 1: CPT | Mod: 50,,, | Performed by: ANESTHESIOLOGY

## 2018-05-21 PROCEDURE — G8907 PT DOC NO EVENTS ON DISCHARG: HCPCS | Performed by: ANESTHESIOLOGY

## 2018-05-21 PROCEDURE — 64483 NJX AA&/STRD TFRM EPI L/S 1: CPT | Mod: LT | Performed by: ANESTHESIOLOGY

## 2018-05-21 RX ORDER — ALPRAZOLAM 1 MG/1
TABLET ORAL
Status: DISPENSED
Start: 2018-05-21 | End: 2018-05-21

## 2018-05-21 RX ORDER — SODIUM CHLORIDE, SODIUM LACTATE, POTASSIUM CHLORIDE, CALCIUM CHLORIDE 600; 310; 30; 20 MG/100ML; MG/100ML; MG/100ML; MG/100ML
INJECTION, SOLUTION INTRAVENOUS ONCE AS NEEDED
Status: DISCONTINUED | OUTPATIENT
Start: 2018-05-21 | End: 2018-05-21 | Stop reason: HOSPADM

## 2018-05-21 RX ORDER — DEXAMETHASONE SODIUM PHOSPHATE 10 MG/ML
INJECTION INTRAMUSCULAR; INTRAVENOUS
Status: DISCONTINUED | OUTPATIENT
Start: 2018-05-21 | End: 2018-05-21 | Stop reason: HOSPADM

## 2018-05-21 RX ORDER — DEXAMETHASONE SODIUM PHOSPHATE 10 MG/ML
INJECTION INTRAMUSCULAR; INTRAVENOUS
Status: DISPENSED
Start: 2018-05-21 | End: 2018-05-22

## 2018-05-21 RX ORDER — BUPIVACAINE HYDROCHLORIDE 2.5 MG/ML
INJECTION, SOLUTION EPIDURAL; INFILTRATION; INTRACAUDAL
Status: DISCONTINUED | OUTPATIENT
Start: 2018-05-21 | End: 2018-05-21 | Stop reason: HOSPADM

## 2018-05-21 RX ORDER — BUPIVACAINE HYDROCHLORIDE 2.5 MG/ML
INJECTION, SOLUTION EPIDURAL; INFILTRATION; INTRACAUDAL
Status: DISPENSED
Start: 2018-05-21 | End: 2018-05-22

## 2018-05-21 RX ORDER — ALPRAZOLAM 0.25 MG/1
1 TABLET ORAL ONCE
Status: COMPLETED | OUTPATIENT
Start: 2018-05-21 | End: 2018-05-21

## 2018-05-21 RX ORDER — LIDOCAINE HYDROCHLORIDE 10 MG/ML
INJECTION, SOLUTION EPIDURAL; INFILTRATION; INTRACAUDAL; PERINEURAL
Status: DISPENSED
Start: 2018-05-21 | End: 2018-05-22

## 2018-05-21 RX ORDER — LIDOCAINE HYDROCHLORIDE 10 MG/ML
INJECTION, SOLUTION EPIDURAL; INFILTRATION; INTRACAUDAL; PERINEURAL
Status: DISCONTINUED | OUTPATIENT
Start: 2018-05-21 | End: 2018-05-21 | Stop reason: HOSPADM

## 2018-05-21 RX ADMIN — LIDOCAINE HYDROCHLORIDE 5 ML: 10 INJECTION, SOLUTION EPIDURAL; INFILTRATION; INTRACAUDAL; PERINEURAL at 12:05

## 2018-05-21 RX ADMIN — DEXAMETHASONE SODIUM PHOSPHATE 10 MG: 10 INJECTION INTRAMUSCULAR; INTRAVENOUS at 12:05

## 2018-05-21 RX ADMIN — ALPRAZOLAM 1 MG: 0.25 TABLET ORAL at 11:05

## 2018-05-21 RX ADMIN — BUPIVACAINE HYDROCHLORIDE 3 ML: 2.5 INJECTION, SOLUTION EPIDURAL; INFILTRATION; INTRACAUDAL at 12:05

## 2018-05-21 NOTE — DISCHARGE INSTRUCTIONS
Home care instructions  You may apply ice pack to the injection site for 20 minutes periods for the first 24hrs, NO HEAT for 2 days   You may shower today but dont soak in a tub above the injection site for 2 days  Resume Aspirin, Plavix, or Coumadin the day after the procedure unless otherwise instructed.  Gradually increase activity  Do not drive for 24 hr  If diabetic monitor your glucose carefully. Follow up with your primary MD if this is a problem    SEEK  IMMEDIATE MEDICAL HELP FOR:  Severe increase in your usual pain or appearance of new pain  Prolonged or increasing weakness or numbness in the legs or arms       -numbing medicine was injected that may affect the nerves that carry information from  Your muscles to your brain and vice versa. Numbness can last up to 6 hours , so be very careful walking.    Drainage, redness, active bleeding, or increased swelling at the injection site  Temperature over 100.0 degrees F.  Headache that increases when your head is upright and decreases when you lie flat  Shortness of breath, chest pain, or problems breathing       Recovery After Procedural Sedation (Adult)  You have been given medicine by vein to make you sleep during your surgery. This may have included both a pain medicine and sleeping medicine. Most of the effects have worn off. But you may still have some drowsiness for the next 6 to 8 hours.  Home care  Follow these guidelines when you get home:  · For the next 8 hours, you should be watched by a responsible adult. This person should make sure your condition is not getting worse.  · Don't drink any alcohol for the next 24 hours.  · Don't drive, operate dangerous machinery, or make important business or personal decisions during the next 24 hours.  Note: Your healthcare provider may tell you not to take any medicine by mouth for pain or sleep in the next 4 hours. These medicines may react with the medicines you were given in the hospital. This could cause a  much stronger response than usual.  Follow-up care  Follow up with your healthcare provider if you are not alert and back to your usual level of activity within 12 hours.  When to seek medical advice  Call your healthcare provider right away if any of these occur:  · Drowsiness gets worse  · Weakness or dizziness gets worse  · Repeated vomiting  · You can't be awakened   Date Last Reviewed: 10/18/2016  © 2041-6957 [employer]. [address] [place] [address], [place] [address]. All rights reserved. This information is not intended as a substitute for professional medical care. Always follow your healthcare professional's instructions.

## 2018-05-21 NOTE — OP NOTE
PROCEDURE DATE: 5/21/2018    PROCEDURE: Bilateral L4-5 transforaminal epidural steroid injection under fluoroscopy    DIAGNOSIS: lumbar disc displacement without myelopathy  Post op diagnosis: Same    PHYSICIAN: Charles Knowles MD    MEDICATIONS INJECTED:  Dexamethasone 5mg (0.5ml) and 1.5ml 0.25% bupivicaine at each nerve root.     LOCAL ANESTHETIC INJECTED:  Lidocaine 1%. 2 ml per site.    SEDATION MEDICATIONS: RN IV sedation    ESTIMATED BLOOD LOSS:  None    COMPLICATIONS:  None    TECHNIQUE:   A time-out was taken to identify patient and procedure side prior to starting the procedure. The patient was placed in a prone position, prepped and draped in the usual sterile fashion using ChloraPrep and sterile towels.  The area to be injected was determined under fluoroscopic guidance in AP and oblique view.  Local anesthetic was given by raising a wheal and going down to the hub of a 25-gauge 1.5 inch needle.  In oblique view, a 3.5 inch 22-gauge bent-tip spinal needle was introduced towards 6 oclock position of the pedicle of each above named nerve root level.  The needle was walked medially then hinged into the neural foramen and position was confirmed in AP and lateral views.  1ml contrast dye was injected to confirm appropriate placement and that there was no vascular uptake.  After negative aspiration for blood or CSF, the medication was then injected. This was performed at the bilateral L4-5 level(s). The patient tolerated the procedure well.    The patient was monitored after the procedure.  Patient was given post procedure and discharge instructions to follow at home. The patient was discharged in a stable condition.

## 2018-05-21 NOTE — PLAN OF CARE
Pt states ready to go home , stable, tolerating fluids. States  Pain 2/10 in right hip with movement. Injection site OBINNA no drainage noted. Ambulated to car accompanied by nurse. Home w/ friend.

## 2018-05-21 NOTE — H&P (VIEW-ONLY)
Referring Physician: No ref. provider found    PCP: Jannet Márquez MD    CC:R low back and R leg pain    Interval History:  Mich Knight is a 57 y.o. female with low back pain who presents today for f/u s/p right L4-5 TFESI. Reports 50% relief for 4 days then pain returned to baseline. She is very disappointed because in the past in [place] she had good relief for 3-4 months with each MARILIA. She is also s/p right SIJ injection that provided her with very good relief for almost 2 weeks. Pain then returned to baseline. She would like TPIs today. Pain begins in her low back, radiates primarily to her R buttock and down the leg to above the knee. She has concomitant diabetic neuropathy. The pain is described as throbbing, shooting, deep, exacerbated by standing, sitting, bending, and walking. She denies any leg weakness. No bowel or bladder incontinence. She takes Gabapentin 300 mg TID without benefit or SEs.  She rates her pain as a 10/10.     HPI:   Mich Knight is a 57 y.o. female with a history of low back pain who recently moved down from [place]. Within the last 2 years she has had progression of low back pain and was seeing a PM&R physician in [place] (Dr. Ramu Lugo in [place]). She was receiving epidurals every 4 months, which she states was helping her pain, with TPIs every month in between. The pain begins in her low back, radiates primarily to her R buttock and down the leg to above the knee. She rarely has pain that goes past her R knee. She has concomitant diabetic neuropathy for which she recently increased her gabapentin dose. The pain as described as throbbing, shooting, deep, exacerbated by standing, sitting, bending, and walking. She denies any leg weakness. No bowel or bladder incontinence. She rates her pain as a 10 today.     ROS:  CONSTITUTIONAL: No fevers, chills, night sweats, wt. loss, appetite changes  SKIN: no rashes or itching  ENT: No headaches, head trauma, vision changes, or eye pain  LYMPH  "NODES: None noticed   CV: No chest pain, palpitations.   RESP: No shortness of breath, dyspnea on exertion, cough, wheezing, or hemoptysis  GI: No nausea, emesis, diarrhea, constipation, melena, hematochezia, pain.    : No dysuria, hematuria, urgency, or frequency   HEME: No easy bruising, bleeding problems  PSYCHIATRIC: No anxiety, psychosis, hallucinations. +ve depression  NEURO: No seizures, memory loss, dizziness or difficulty sleeping  MSK: +ve per HPI      Past Medical History:   Diagnosis Date    DDD (degenerative disc disease), lumbar     Diabetes mellitus     Essential tremor     HLD (hyperlipidemia)     HTN (hypertension)      Past Surgical History:   Procedure Laterality Date    CHOLECYSTECTOMY       Family History   Problem Relation Age of Onset    Heart disease Mother     Macular degeneration Mother     Diabetes type II Father     Dementia Father     Heart disease Father      Social History     Social History    Marital status:      Spouse name: N/A    Number of children: N/A    Years of education: N/A     Social History Main Topics    Smoking status: Current Every Day Smoker     Packs/day: 0.50     Years: 40.00    Smokeless tobacco: Never Used    Alcohol use 0.0 - 3.0 oz/week    Drug use: No    Sexual activity: Not Currently     Other Topics Concern    None     Social History Narrative    Does not work, reports being disabled due to back pain. Previously was .          Medications/Allergies: See med card    Vitals:    04/25/18 0958   BP: 129/87   Pulse: 88   Weight: 65.8 kg (145 lb)   Height: 5' 3" (1.6 m)   PainSc: 10-Worst pain ever   PainLoc: Back         Physical exam:    GENERAL: A and O x3, the patient appears well groomed and is in no acute distress.  Skin: No rashes or obvious lesions  HEENT: normocephalic, atraumatic  CARDIOVASCULAR:  RRR  LUNGS: non labored breathing  ABDOMEN: soft, nontender   UPPER EXTREMITIES: Normal alignment, normal range of " motion, no atrophy, no skin changes,  hair growth and nail growth normal and equal bilaterally. No swelling, no tenderness.    LOWER EXTREMITIES:  Normal alignment, normal range of motion, no atrophy, no skin changes,  hair growth and nail growth normal and equal bilaterally. No swelling, no tenderness.    LUMBAR SPINE  Lumbar spine: ROM is full with flexion extension and oblique extension with  increased pain.    Levi's test causes increased pain on R side with bilateral maneuvers  Supine straight leg raise is negative bilaterally.    Internal and external rotation of the hip causes no increased pain on either side.  Myofascial exam: tenderness with palpation along lower lumbar region, +ve tenderness along R SI joint      MENTAL STATUS: normal orientation, speech, language, and fund of knowledge for social situation.  Emotional state appropriate.    CRANIAL NERVES:  II:  PERRL bilaterally,   III,IV,VI: EOMI.    V:  Facial sensation equal bilaterally  VII:  Facial motor function normal.  VIII:  Hearing equal to finger rub bilaterally  IX/X: Gag normal, palate symmetric  XI:  Shoulder shrug equal, head turn equal  XII:  Tongue midline without fasciculations      MOTOR: Tone and bulk: normal bilateral upper and lower Strength: normal   Delt Bi Tri WE WF     R 5 5 5 5 5 5   L 5 5 5 5 5 5     IP ADD ABD Quad TA Gas HAM  R 4 4 4 4 4 4 4  L 4 4 4 4 4 4 4    SENSATION: Light touch and pinprick intact bilaterally  REFLEXES: normal, symmetric, nonbrisk.  Toes down, no clonus. No hoffmans.  GAIT: normal rise, base, steps, and arm swing.        Imaging:  MRI L-spine 9/2016        L4-5, moderate disc desiccation.  Concentric disc bulge with small central protrusion.  Left greater than right subarticular protrusion.  Moderate bilateral facet arthropathy.  Mild central canal stenosis.  Moderate advanced left foraminal stenosis.  L5-S1 advanced disc desiccation loss of disc height.  Broad-based disc protrusion to the right.   Moderate bilateral facet arthropathy.  There is advanced right foraminal stenosis.  A minimal left foraminal stenosis.    Assessment:  56 yo female with low back pain radiating to R leg   1. Lumbar radiculopathy    2. Sacroiliitis    3. Myalgia    4. Myofascial pain    5. Facet arthropathy        Plan:  1. I have stressed the importance of physical activity and exercise to improve overall health  2. Continued low back pain may benefit from lumbar MBBs. She has know facet arthropathy at L4, 5  3. TPIs today to help with myofascial pain  4. Recommend repeat lumbar TFESI on right at L5  5. Consider L5, S1, S2 MB Block if SIJ pain continues  6. Increase Gabapentin to 600 mg TID.   7. F/u s/p MARILIA

## 2018-05-21 NOTE — DISCHARGE SUMMARY
[facility]  Discharge Note  Short Stay    Admit Date: 5/21/2018    Discharge Date and Time: 5/21/2018    Attending Physician: Charles Knowles MD     Discharge Provider: Charles Knowles    Diagnoses:  Active Hospital Problems    Diagnosis  POA    *Lumbar radiculitis [M54.16]  Yes      Resolved Hospital Problems    Diagnosis Date Resolved POA   No resolved problems to display.       Hospital Course: Lumbar MARILIA  Discharged Condition: Good    Final Diagnoses:   Active Hospital Problems    Diagnosis  POA    *Lumbar radiculitis [M54.16]  Yes      Resolved Hospital Problems    Diagnosis Date Resolved POA   No resolved problems to display.       Disposition: Home or Self Care    Follow up/Patient Instructions:    Medications:  Reconciled Home Medications:      Medication List      CONTINUE taking these medications    aspirin 81 MG EC tablet  Commonly known as:  ECOTRIN  Take 81 mg by mouth once daily.     atorvastatin 20 MG tablet  Commonly known as:  LIPITOR  Take 20 mg by mouth once daily.     gabapentin 300 MG capsule  Commonly known as:  NEURONTIN  Take 1 capsule (300 mg total) by mouth 3 (three) times daily.     glipiZIDE 10 MG Tr24  Commonly known as:  GLUCOTROL  Take 5 mg by mouth daily with breakfast.     lisinopril-hydrochlorothiazide 20-12.5 mg per tablet  Commonly known as:  PRINZIDE,ZESTORETIC  Take 1 tablet by mouth once daily.     metFORMIN 1000 MG tablet  Commonly known as:  GLUCOPHAGE  Take 1,000 mg by mouth 2 (two) times daily with meals.     traMADol 50 mg tablet  Commonly known as:  ULTRAM  Take 50 mg by mouth every 12 (twelve) hours as needed for Pain.            Discharge Procedure Orders  Call MD for:  temperature >100.4     Call MD for:  persistent nausea and vomiting or diarrhea     Call MD for:  severe uncontrolled pain     Call MD for:  redness, tenderness, or signs of infection (pain, swelling, redness, odor or green/yellow discharge around incision site)     Call MD for:  difficulty breathing  or increased cough     Call MD for:  severe persistent headache          Follow up with MD in 2-3 weeks    Discharge Procedure Orders (must include Diet, Follow-up, Activity):    Discharge Procedure Orders (must include Diet, Follow-up, Activity)  Call MD for:  temperature >100.4     Call MD for:  persistent nausea and vomiting or diarrhea     Call MD for:  severe uncontrolled pain     Call MD for:  redness, tenderness, or signs of infection (pain, swelling, redness, odor or green/yellow discharge around incision site)     Call MD for:  difficulty breathing or increased cough     Call MD for:  severe persistent headache

## 2018-05-22 VITALS
RESPIRATION RATE: 18 BRPM | DIASTOLIC BLOOD PRESSURE: 75 MMHG | BODY MASS INDEX: 25.7 KG/M2 | OXYGEN SATURATION: 94 % | HEART RATE: 79 BPM | SYSTOLIC BLOOD PRESSURE: 107 MMHG | HEIGHT: 63 IN | TEMPERATURE: 98 F | WEIGHT: 145.06 LBS

## 2018-05-23 RX ORDER — LISINOPRIL AND HYDROCHLOROTHIAZIDE 12.5; 2 MG/1; MG/1
1 TABLET ORAL DAILY
Qty: 30 TABLET | Refills: 3 | Status: SHIPPED | OUTPATIENT
Start: 2018-05-23 | End: 2018-07-27 | Stop reason: SDUPTHER

## 2018-05-23 RX ORDER — GABAPENTIN 300 MG/1
600 CAPSULE ORAL 3 TIMES DAILY
Qty: 180 CAPSULE | Refills: 2 | Status: SHIPPED | OUTPATIENT
Start: 2018-05-23 | End: 2018-08-31 | Stop reason: SDUPTHER

## 2018-05-23 NOTE — TELEPHONE ENCOUNTER
----- Message from Leander Villalpando sent at 5/23/2018 10:35 AM CDT -----  Contact: same  Patient called in and is requesting refills on the following Rx's:    1.  lisinopril-hydrochlorothiazide (PRINZIDE,ZESTORETIC) 20-12.5 mg per tablet, Take 1 tablet by mouth once daily (Dr. Márquez has never filled this Rx)    [facility].  Phone number: [phone]    Patient call back number is [phone]

## 2018-05-23 NOTE — TELEPHONE ENCOUNTER
----- Message from Leander Villlapando sent at 5/23/2018 10:39 AM CDT -----  Contact: same  Type:  RX Refill Request    Who Called:  patient  Refill or New Rx:  refill  RX Name and Strength:  gabapentin (NEURONTIN) 300 MG capsule  How is the patient currently taking it? (ex. 1XDay):  3 x daily  Is this a 30 day or 90 day RX:  30  Preferred Pharmacy with phone number:    [id number] - [place], [place] - [address]  [place] [address]  Phone: [phone] Fax: [phone]  Ordering Provider:  Benson Ron Call Back Number:  [phone]  Additional Information:  n/a

## 2018-06-05 ENCOUNTER — PATIENT OUTREACH (OUTPATIENT)
Dept: ADMINISTRATIVE | Facility: HOSPITAL | Age: 58
End: 2018-06-05

## 2018-06-05 NOTE — LETTER
June 5, 2018    Mich Allenaver  [address] [facility] [address]             [facility]  [address]  [facility] [address]  Phone: [phone] Dear Mrs. Knight:    [facility] is committed to your overall health.  To help you get the most out of each of your visits, we will review your information to make sure you are up to date on all of your recommended tests and/or procedures.      Dr. Márquez has found that your chart shows you may be due for tetanus and pneumonia vaccine, mammogram, pap smear, colon cancer screening.     If you have had any of the above done at another facility, please bring the records or information with you so that your record at [facility] will be complete.  If you would like to schedule any of these, please contact me.    If you are currently taking medication, please bring it with you to your appointment for review.            If you have any questions or concerns, please don't hesitate to call.    Stephania Heredia LPN Clinical Care Coordinator  [facility] [address] [facility] [address]  [place] [address]  [phone] (p)   [phone] (f)

## 2018-06-12 ENCOUNTER — OFFICE VISIT (OUTPATIENT)
Dept: PAIN MEDICINE | Facility: CLINIC | Age: 58
End: 2018-06-12
Payer: MEDICARE

## 2018-06-12 VITALS
DIASTOLIC BLOOD PRESSURE: 81 MMHG | BODY MASS INDEX: 25.69 KG/M2 | WEIGHT: 145 LBS | HEIGHT: 63 IN | SYSTOLIC BLOOD PRESSURE: 119 MMHG | HEART RATE: 84 BPM

## 2018-06-12 DIAGNOSIS — M53.3 SACROILIAC JOINT PAIN: ICD-10-CM

## 2018-06-12 DIAGNOSIS — M51.36 DDD (DEGENERATIVE DISC DISEASE), LUMBAR: Primary | ICD-10-CM

## 2018-06-12 DIAGNOSIS — M47.819 FACET ARTHROPATHY: ICD-10-CM

## 2018-06-12 DIAGNOSIS — M79.10 MYALGIA: ICD-10-CM

## 2018-06-12 PROCEDURE — 99999 PR PBB SHADOW E&M-EST. PATIENT-LVL IV: CPT | Mod: PBBFAC,,, | Performed by: PHYSICIAN ASSISTANT

## 2018-06-12 PROCEDURE — 99213 OFFICE O/P EST LOW 20 MIN: CPT | Mod: S$PBB,,, | Performed by: PHYSICIAN ASSISTANT

## 2018-06-12 PROCEDURE — 99214 OFFICE O/P EST MOD 30 MIN: CPT | Mod: PBBFAC,PN | Performed by: PHYSICIAN ASSISTANT

## 2018-06-12 NOTE — PROGRESS NOTES
"Referring Physician: No ref. provider found    PCP: Jannet Márquez MD    CC:R low back and R leg pain    Interval History:  Mich Knight is a 57 y.o. female with low back pain who presents today for f/u s/p bilateral L4-5 TFESI. Reports 60% relief for 1 week. Previous right sided L4-5 TFESI provided 50% relief for 4 days.  She is very disappointed because in the past in [place] she had good relief for 3-4 months with each MARILIA. She is also s/p right SIJ injection that provided her with very good relief for almost 2 weeks. Pain then returned to baseline. Pain begins in her low back, radiates primarily to her  buttock and down legs to above the knee.She also c/o pain in her mid back, neck , and UEs. The pain is described as throbbing, shooting, deep, exacerbated by standing, sitting, bending, and walking. She denies any leg weakness. No bowel or bladder incontinence. She takes Gabapentin 600 mg TID without benefit or SEs.She reports she can not tolerate PT. She tried in the past and could not get out of bed for 2 months. She has concomitant diabetic neuropathy. She states she was offered back surgery in 2009 and 2010 by 2 surgeons in [place] for "missing discs".  She rates her pain as a 10/10.     HPI:   Mich Knight is a 57 y.o. female with a history of low back pain who recently moved down from [place]. Within the last 2 years she has had progression of low back pain and was seeing a PM&R physician in [place] (Dr. Ramu Lugo in [place]). She was receiving epidurals every 4 months, which she states was helping her pain, with TPIs every month in between. The pain begins in her low back, radiates primarily to her R buttock and down the leg to above the knee. She rarely has pain that goes past her R knee. She has concomitant diabetic neuropathy for which she recently increased her gabapentin dose. The pain as described as throbbing, shooting, deep, exacerbated by standing, sitting, bending, and walking. She denies any leg " "weakness. No bowel or bladder incontinence. She rates her pain as a 10 today.     ROS:  CONSTITUTIONAL: No fevers, chills, night sweats, wt. loss, appetite changes  SKIN: no rashes or itching  ENT: No headaches, head trauma, vision changes, or eye pain  LYMPH NODES: None noticed   CV: No chest pain, palpitations.   RESP: No shortness of breath, dyspnea on exertion, cough, wheezing, or hemoptysis  GI: No nausea, emesis, diarrhea, constipation, melena, hematochezia, pain.    : No dysuria, hematuria, urgency, or frequency   HEME: No easy bruising, bleeding problems  PSYCHIATRIC: No anxiety, psychosis, hallucinations. +ve depression  NEURO: No seizures, memory loss, dizziness or difficulty sleeping  MSK: +ve per HPI      Past Medical History:   Diagnosis Date    DDD (degenerative disc disease), lumbar     Diabetes mellitus     Essential tremor     HLD (hyperlipidemia)     HTN (hypertension)      Past Surgical History:   Procedure Laterality Date    CHOLECYSTECTOMY       Family History   Problem Relation Age of Onset    Heart disease Mother     Macular degeneration Mother     Diabetes type II Father     Dementia Father     Heart disease Father      Social History     Social History    Marital status:      Spouse name: N/A    Number of children: N/A    Years of education: N/A     Social History Main Topics    Smoking status: Current Every Day Smoker     Packs/day: 0.50     Years: 40.00    Smokeless tobacco: Never Used    Alcohol use 0.0 - 3.0 oz/week    Drug use: No    Sexual activity: Not Currently     Other Topics Concern    None     Social History Narrative    Does not work, reports being disabled due to back pain. Previously was .          Medications/Allergies: See med card    Vitals:    06/12/18 0824   BP: 119/81   Pulse: 84   Weight: 65.8 kg (145 lb)   Height: 5' 3" (1.6 m)   PainSc: 10-Worst pain ever   PainLoc: Back         Physical exam:    GENERAL: A and O x3, the " patient appears well groomed and is in no acute distress.  Skin: No rashes or obvious lesions  HEENT: normocephalic, atraumatic  CARDIOVASCULAR:  RRR  LUNGS: non labored breathing  ABDOMEN: soft, nontender   UPPER EXTREMITIES: Normal alignment, normal range of motion, no atrophy, no skin changes,  hair growth and nail growth normal and equal bilaterally. No swelling, no tenderness.    LOWER EXTREMITIES:  Normal alignment, normal range of motion, no atrophy, no skin changes,  hair growth and nail growth normal and equal bilaterally. No swelling, no tenderness.    LUMBAR SPINE  Lumbar spine: ROM is full with flexion extension and oblique extension with  increased pain.    Levi's test causes increased pain on R side with bilateral maneuvers  Supine straight leg raise is negative bilaterally.    Internal and external rotation of the hip causes no increased pain on either side.  Myofascial exam: tenderness with palpation along lower lumbar region, +ve tenderness along R SI joint      MENTAL STATUS: normal orientation, speech, language, and fund of knowledge for social situation.  Emotional state appropriate.    CRANIAL NERVES:  II:  PERRL bilaterally,   III,IV,VI: EOMI.    V:  Facial sensation equal bilaterally  VII:  Facial motor function normal.  VIII:  Hearing equal to finger rub bilaterally  IX/X: Gag normal, palate symmetric  XI:  Shoulder shrug equal, head turn equal  XII:  Tongue midline without fasciculations      MOTOR: Tone and bulk: normal bilateral upper and lower Strength: normal   Delt Bi Tri WE WF     R 5 5 5 5 5 5   L 5 5 5 5 5 5     IP ADD ABD Quad TA Gas HAM  R 4 4 4 4 4 4 4  L 4 4 4 4 4 4 4    SENSATION: Light touch and pinprick intact bilaterally  REFLEXES: normal, symmetric, nonbrisk.  Toes down, no clonus. No hoffmans.  GAIT: normal rise, base, steps, and arm swing.        Imaging:  MRI L-spine 9/2016        L4-5, moderate disc desiccation.  Concentric disc bulge with small central  protrusion.  Left greater than right subarticular protrusion.  Moderate bilateral facet arthropathy.  Mild central canal stenosis.  Moderate advanced left foraminal stenosis.  L5-S1 advanced disc desiccation loss of disc height.  Broad-based disc protrusion to the right.  Moderate bilateral facet arthropathy.  There is advanced right foraminal stenosis.  A minimal left foraminal stenosis.    Assessment:  56 yo female with low back pain radiating to bilateral LEs  1. DDD (degenerative disc disease), lumbar    2. Facet arthropathy    3. Sacroiliac joint pain    4. Myalgia        Plan:  1. I have stressed the importance of physical activity and exercise to improve overall health  2. Continued low back pain may benefit from lumbar MBBs. She has known facet arthropathy at L4, 5. She declines  3. She requests a referral to neurosurgery. Referral placed. Updated MRI ordered  4. Recommend physical therapy. She declines.  5. Discussed current pain and the effect of expectations of pain relief and control on actual outcome. Pain disability index score was a 70/70, showing she has pretty poor insight into her condition.   6. F/u prn

## 2018-06-18 ENCOUNTER — TELEPHONE (OUTPATIENT)
Dept: PAIN MEDICINE | Facility: CLINIC | Age: 58
End: 2018-06-18

## 2018-06-18 ENCOUNTER — HOSPITAL ENCOUNTER (OUTPATIENT)
Dept: RADIOLOGY | Facility: HOSPITAL | Age: 58
Discharge: HOME | End: 2018-06-18
Attending: PHYSICIAN ASSISTANT
Payer: MEDICARE

## 2018-06-18 DIAGNOSIS — M51.36 DDD (DEGENERATIVE DISC DISEASE), LUMBAR: ICD-10-CM

## 2018-06-18 PROCEDURE — 72148 MRI LUMBAR SPINE W/O DYE: CPT | Mod: TC

## 2018-06-18 PROCEDURE — 72148 MRI LUMBAR SPINE W/O DYE: CPT | Mod: 26,,, | Performed by: RADIOLOGY

## 2018-06-18 NOTE — TELEPHONE ENCOUNTER
MRI showed multilevel disc degeneration and facet arthropathy at the lower levels. PT requested a referral to neurosurgeon PEE. Referral was placed. MRI will be discussed in detail at that consult.

## 2018-06-19 ENCOUNTER — OFFICE VISIT (OUTPATIENT)
Dept: FAMILY MEDICINE | Facility: CLINIC | Age: 58
End: 2018-06-19
Payer: MEDICARE

## 2018-06-19 VITALS
WEIGHT: 149.31 LBS | HEIGHT: 63 IN | OXYGEN SATURATION: 95 % | DIASTOLIC BLOOD PRESSURE: 76 MMHG | HEART RATE: 94 BPM | SYSTOLIC BLOOD PRESSURE: 120 MMHG | TEMPERATURE: 99 F | BODY MASS INDEX: 26.46 KG/M2

## 2018-06-19 DIAGNOSIS — Z12.11 SCREENING FOR MALIGNANT NEOPLASM OF COLON: ICD-10-CM

## 2018-06-19 DIAGNOSIS — R80.9 TYPE 2 DIABETES MELLITUS WITH MICROALBUMINURIA, WITHOUT LONG-TERM CURRENT USE OF INSULIN: Primary | ICD-10-CM
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
## 2018-06-19 DIAGNOSIS — E11.29 TYPE 2 DIABETES MELLITUS WITH MICROALBUMINURIA, WITHOUT LONG-TERM CURRENT USE OF INSULIN: Primary | ICD-10-CM

## 2018-06-19 DIAGNOSIS — E78.5 HYPERLIPIDEMIA, UNSPECIFIED HYPERLIPIDEMIA TYPE: ICD-10-CM

## 2018-06-19 DIAGNOSIS — B18.2 HEP C W/O COMA, CHRONIC: ICD-10-CM

## 2018-06-19 DIAGNOSIS — I10 ESSENTIAL HYPERTENSION: ICD-10-CM

## 2018-06-19 LAB — GLUCOSE SERPL-MCNC: 267 MG/DL (ref 70–110)

## 2018-06-19 PROCEDURE — 99214 OFFICE O/P EST MOD 30 MIN: CPT | Mod: S$PBB,,, | Performed by: INTERNAL MEDICINE

## 2018-06-19 PROCEDURE — 99213 OFFICE O/P EST LOW 20 MIN: CPT | Mod: PBBFAC,PN | Performed by: INTERNAL MEDICINE

## 2018-06-19 PROCEDURE — 82948 REAGENT STRIP/BLOOD GLUCOSE: CPT | Mod: PBBFAC,PN | Performed by: INTERNAL MEDICINE

## 2018-06-19 PROCEDURE — 99999 PR PBB SHADOW E&M-EST. PATIENT-LVL III: CPT | Mod: PBBFAC,,, | Performed by: INTERNAL MEDICINE

## 2018-06-19 NOTE — PROGRESS NOTES
Assessment and Plan:    1. Type 2 diabetes mellitus with microalbuminuria, without long-term current use of insulin  Patient reporting worsening of home blood glucose readings with symptoms of hyperglycemia. Previously with relatively good control based on A1c in February. In office POCT glucose today is 267. Encouraged patient to continue working on dietary changes. Continue with home blood sugar checks. Will obtain A1c today and if worse will make changes to regimen. Follow-up in 3 months.   Last A1c: 7.0 in Feb 2018  Foot exam: Has seen podiatry on April 2018  Eye exam: Saw optometry without retinopathy on April 2018   Nephropathy screening: Urine microalbuminuria present Feb 2018  Lipids: On atorvastatin, lipids checked Feb 2018  Medications: Continue metformin and glipizide for now  We discussed the role of diet and exercise in managing diabetes at this visit.   - POCT Glucose  - Hemoglobin A1c; Future    2. Essential hypertension  Stable, well controlled in office today and typically has been on past visits.  Continue with lisinopril-HCTZ.    3. Hyperlipidemia, unspecified hyperlipidemia type  Labs reviewed from Feb 2018. Pt with triglycerides of 401.  Continue with atorvastatin.    4. Hep C w/o coma, chronic  Recently found to be Hep C positive with HCV RNA elevated in February 2018. Patient has been scheduled twice with Hepatology and cancelled. She reports that they are requesting she go to [place]. She is refusing to go to [place] due to difficulties with driving and financial concerns. Discussed the importance of seeing hepatology and the risks of not treating. Re-enforced following-up with hepatology to see what options are available and to set up a new appointment.     5. Screening for malignant neoplasm of colon  Patient due for colon cancer screening.   - Fecal Immunochemical Test (iFOBT); Future    6. Insect Bite  Patient with insect bite on right wrist without signs of infection.  Recommend  "OTC hydrocortisone cream.    ______________________________________________________________________  Subjective:    Chief Complaint:  Follow up chronic medical conditions    HPI:  Mich is a 57 y.o. year old woman here to follow up chronic medical conditons. She has a "bug bite" on her wrist today. This has been present for 1 week and is somewhat worse per patient. She noticed it after gardening. She reports some clear drainage without purulence.  She has tried some OTC ointments which have not helped.      DM2- She has been checking her sugars at home and reports the machine is reading "hi" most days. She is out of test strips and has not gotten more as she is waiting for her next check. Pt reports good compliance with her metformin 1000mg BID and glipizide 10mg a day. She reports eating mainly fruits and vegetables and is avoiding sweets and junk food. She endorses polyuria, polydypsia, and burning/tingling pain in her feet. She denies vision changes, chest pain, and shortness of breath.     HTN- Currently taking lisinopril-HCTZ and reports good compliance. She does not take her blood pressure at home.    HLD- Takes atorvastatin without issue.     Tobacco abuse- No interest in quitting at this time.    Hep C- Cancelled apt with Hepatology. Reports difficulty with travel to [place].     Chronic pain - Currently seeing pain management. She reports that she had an MRI recently and was told to follow-up with neurosurgery.     Medications:  Current Outpatient Prescriptions on File Prior to Visit   Medication Sig Dispense Refill    aspirin (ECOTRIN) 81 MG EC tablet Take 81 mg by mouth once daily.      gabapentin (NEURONTIN) 300 MG capsule Take 2 capsules (600 mg total) by mouth 3 (three) times daily. (Patient taking differently: Take 900 mg by mouth 3 (three) times daily. ) 180 capsule 2    glipiZIDE (GLUCOTROL) 10 MG TR24 Take 5 mg by mouth daily with breakfast.      lisinopril-hydrochlorothiazide " "(PRINZIDE,ZESTORETIC) 20-12.5 mg per tablet Take 1 tablet by mouth once daily. 30 tablet 3    metFORMIN (GLUCOPHAGE) 1000 MG tablet Take 1,000 mg by mouth 2 (two) times daily with meals.      [DISCONTINUED] atorvastatin (LIPITOR) 20 MG tablet Take 20 mg by mouth once daily.       No current facility-administered medications on file prior to visit.        Review of Systems:  Review of Systems   Constitutional: Negative for chills, fatigue and fever.   HENT: Negative for congestion and rhinorrhea.    Eyes: Negative for visual disturbance.   Respiratory: Negative for cough and shortness of breath.    Cardiovascular: Negative for chest pain, palpitations and leg swelling.   Gastrointestinal: Negative for abdominal pain, constipation, diarrhea, nausea and vomiting.   Endocrine: Positive for polydipsia and polyuria.   Genitourinary: Positive for frequency. Negative for difficulty urinating and dysuria.   Musculoskeletal: Positive for back pain and gait problem.   Skin: Positive for wound. Negative for rash.   Neurological: Negative for dizziness, weakness, light-headedness and headaches.       Past Medical History:  Past Medical History:   Diagnosis Date    DDD (degenerative disc disease), lumbar     Diabetes mellitus     Essential tremor     HLD (hyperlipidemia)     HTN (hypertension)        Objective:    Vitals:  Vitals:    06/19/18 0951   BP: 120/76   Pulse: 94   Temp: 98.5 °F (36.9 °C)   TempSrc: Oral   SpO2: 95%   Weight: 67.7 kg (149 lb 4.8 oz)   Height: 5' 3" (1.6 m)   PainSc: 10-Worst pain ever   PainLoc: Back       Physical Exam   Constitutional: She appears well-developed and well-nourished.   HENT:   Head: Normocephalic and atraumatic.   Neck: Normal range of motion. Neck supple.   Cardiovascular: Normal rate, regular rhythm, normal heart sounds and intact distal pulses.    Pulmonary/Chest: Effort normal and breath sounds normal. She has no wheezes. She has no rales.   Musculoskeletal: She exhibits no " edema or deformity.   Lymphadenopathy:     She has no cervical adenopathy.   Neurological: She is alert.   Skin: Skin is warm and dry. She is not diaphoretic. No erythema.   Small raised erythematous skin lesion with excoriations present on the dorsal aspect of the right wrist. No surrounding erythema, no warmth, no discharge present.        Data:  Previous labs including A1c, Lipid panel, and Hep C RNA reviewed and pertinent for A1c of 7.0, triglycerides of 401, and HCV RNA of 6.76.      Jannet Márquez MD  Internal Medicine

## 2018-06-19 NOTE — PATIENT INSTRUCTIONS
Fit Kit instructions:    1.Verify that your name and date of birth are correct on the label.    2. Place either the collection paper inside the toilet bowl on top of the water OR if supplied with a white specimen hat, place that to the rear of the toilet.    2. Open the green cap by lifting and twisting off the collection bottle.    3. Collect the stool off the paper before paper sinks or the sample gets wet with the green stick probe (from the collection bottle) by scraping the surface.  If using the specimen hat, collect the sample using the same method.  MAKE SURE THE GROOVED PORTION OF THE STICK IS COMPLETELY COVERED WITH THE STOOL SAMPLE.    4. Close the sample bottle by placing the green stick probe, that you collected the sample with in to to bottle that you removed it from.   Make sure the cap is on tightly.  DO NOT REOPEN.    5. Wrap the sample bottle in the absorbent pad and place it in the plastic bag provided in the package.    6. Mailing instructions:   A. Make sure that you write the stool collection date on the paperwork before placing it in to the envelope provided with the plastic bag that contains the stool sample.      B. Fold the flap at the pre-folded line.     C. Peel the tape from the flap.     D. Press firmly to seal the envelope.      E. MAIL AS SOON AS POSSIBLE AND WITHIN 24 HOURS OR THE SAMPLE     WILL NOT BE ABLE TO BE PROCESSED.

## 2018-06-25 ENCOUNTER — OFFICE VISIT (OUTPATIENT)
Dept: NEUROSURGERY | Facility: CLINIC | Age: 58
End: 2018-06-25
Payer: MEDICARE

## 2018-06-25 ENCOUNTER — TELEPHONE (OUTPATIENT)
Dept: PAIN MEDICINE | Facility: CLINIC | Age: 58
End: 2018-06-25

## 2018-06-25 VITALS
SYSTOLIC BLOOD PRESSURE: 124 MMHG | HEIGHT: 62 IN | HEART RATE: 95 BPM | DIASTOLIC BLOOD PRESSURE: 80 MMHG | BODY MASS INDEX: 27.99 KG/M2 | WEIGHT: 152.13 LBS

## 2018-06-25 DIAGNOSIS — M51.36 DDD (DEGENERATIVE DISC DISEASE), LUMBAR: Primary | ICD-10-CM

## 2018-06-25 PROCEDURE — 99204 OFFICE O/P NEW MOD 45 MIN: CPT | Mod: S$PBB,,, | Performed by: NEUROLOGICAL SURGERY

## 2018-06-25 PROCEDURE — 99213 OFFICE O/P EST LOW 20 MIN: CPT | Mod: PBBFAC,PN | Performed by: NEUROLOGICAL SURGERY

## 2018-06-25 PROCEDURE — 99999 PR PBB SHADOW E&M-EST. PATIENT-LVL III: CPT | Mod: PBBFAC,,, | Performed by: NEUROLOGICAL SURGERY

## 2018-06-25 NOTE — LETTER
June 25, 2018      Carmen Rowell PA-C  [address] [place] [address] Dr  [address]  [facility] [address]           [facility]  [address] [facility] [address] [place] [address]  Phone: [phone]  Fax: [phone]          Patient: Mich Knight   MR Number: [id number]   YOB: 1960   Date of Visit: 6/25/2018       Dear Carmen Rowell:    Thank you for referring Mich Knight to me for evaluation. Attached you will find relevant portions of my assessment and plan of care.    If you have questions, please do not hesitate to call me. I look forward to following Mich Knight along with you.    Sincerely,    Monik Martinez MD    Enclosure  CC:  No Recipients    If you would like to receive this communication electronically, please contact [email or web address] or [phone] to request more information on [facility] access.    For providers and/or their staff who would like to refer a patient to [facility], please contact us through our one-stop-shop provider referral line, [facility], at [phone].    If you feel you have received this communication in error or would no longer like to receive these types of communications, please e-mail [email or web address]

## 2018-06-25 NOTE — TELEPHONE ENCOUNTER
She has had 3 steroid injections in the last 6 months with minimal benefit. The longest benefit was 2 weeks. We can schedule a medial branch block workup for her low back that was discussed in the past, but this  has the possibility to help ONLY low back pain for a short time. If test is positive, we would schedule RFA which again would only help low back pain. She c/o very widespread pain and muscle weakness which will likely require extensive PT.

## 2018-06-25 NOTE — TELEPHONE ENCOUNTER
----- Message from Nicol Randolph sent at 6/25/2018  1:29 PM CDT -----  Contact: self [phone]  She is requesting an appt for today or tomorrow morning for an injection for the back pain.  First available was 7/3.  Thank you!

## 2018-06-25 NOTE — PROGRESS NOTES
Neurosurgery History and Physical    Patient ID: Mich Knight is a 57 y.o. female.    Chief Complaint   Patient presents with    Lumbar Spine Pain (L-Spine)     Reports long history of chronic low back pain without history of trauma or injury. States pain is moderate-severe and radiates to her BLE to the feet. R>L. Reports numbness and weakness. Denies bladder or bowel dysfunction. Pain is increased by prolonged sitting, walking, lying down. Denies alleviating factors. She has gotten some relief from ESIs in the past but states they are no longer effective. She has done PT which she states increased her pain.           Review of Systems   Constitutional: Negative.    HENT: Negative.    Eyes: Negative.    Respiratory: Negative.    Cardiovascular: Negative.    Gastrointestinal: Negative.    Endocrine: Negative.    Genitourinary: Negative.    Musculoskeletal: Positive for back pain.   Skin: Negative.    Allergic/Immunologic: Negative.    Neurological: Positive for numbness. Negative for weakness.   Hematological: Negative.    Psychiatric/Behavioral: Negative.        Past Medical History:   Diagnosis Date    DDD (degenerative disc disease), lumbar     Diabetes mellitus     Essential tremor     HLD (hyperlipidemia)     HTN (hypertension)      Social History     Social History    Marital status:      Spouse name: N/A    Number of children: N/A    Years of education: N/A     Occupational History    Not on file.     Social History Main Topics    Smoking status: Current Every Day Smoker     Packs/day: 0.50     Years: 40.00    Smokeless tobacco: Never Used    Alcohol use 0.0 - 3.0 oz/week    Drug use: No    Sexual activity: Not Currently     Other Topics Concern    Not on file     Social History Narrative    Does not work, reports being disabled due to back pain. Previously was .      Family History   Problem Relation Age of Onset    Heart disease Mother     Macular degeneration Mother  "    Diabetes type II Father     Dementia Father     Heart disease Father      Review of patient's allergies indicates:  No Known Allergies    Current Outpatient Prescriptions:     aspirin (ECOTRIN) 81 MG EC tablet, Take 81 mg by mouth once daily., Disp: , Rfl:     glipiZIDE (GLUCOTROL) 10 MG TR24, Take 5 mg by mouth daily with breakfast., Disp: , Rfl:     lisinopril-hydrochlorothiazide (PRINZIDE,ZESTORETIC) 20-12.5 mg per tablet, Take 1 tablet by mouth once daily., Disp: 30 tablet, Rfl: 3    metFORMIN (GLUCOPHAGE) 1000 MG tablet, Take 1,000 mg by mouth 2 (two) times daily with meals., Disp: , Rfl:     gabapentin (NEURONTIN) 300 MG capsule, Take 2 capsules (600 mg total) by mouth 3 (three) times daily. (Patient taking differently: Take 900 mg by mouth 3 (three) times daily. ), Disp: 180 capsule, Rfl: 2  Blood pressure 124/80, pulse 95, height 5' 2" (1.575 m), weight 69 kg (152 lb 1.9 oz).      Neurologic Exam     Mental Status   Oriented to person, place, and time.   Attention: normal. Concentration: normal.   Speech: speech is normal   Level of consciousness: alert  Knowledge: good.     Cranial Nerves     CN II   Visual acuity: normal    CN III, IV, VI   Pupils are equal, round, and reactive to light.  Extraocular motions are normal.     CN V   Facial sensation intact.     CN VII   Facial expression full, symmetric.     CN VIII   Hearing: intact    CN IX, X   Palate: symmetric    CN XI   CN XI normal.     CN XII   CN XII normal.     Motor Exam   Muscle bulk: normal  Overall muscle tone: normal  Right arm pronator drift: absent  Left arm pronator drift: absent    Strength   Right deltoid: 5/5  Left deltoid: 5/5  Right biceps: 5/5  Left biceps: 5/5  Right triceps: 5/5  Left triceps: 5/5  Right wrist flexion: 5/5  Left wrist flexion: 5/5  Right wrist extension: 5/5  Left wrist extension: 5/5  Right interossei: 5/5  Left interossei: 5/5  Right iliopsoas: 4/5  Left iliopsoas: 4/5  Right quadriceps: 4/5  Left " quadriceps: 4/5  Right hamstrin/5  Left hamstrin/5  Right anterior tibial: 4/5  Left anterior tibial: 4/5  Right posterior tibial: 4/5  Left posterior tibial: 4/5  Right peroneal: 4/5  Left peroneal: 4/5  Right gastroc: 4/5  Left gastroc: 4/5    Sensory Exam   Right leg light touch: decreased from ankle  Left leg light touch: decreased from ankle    Gait, Coordination, and Reflexes     Gait  Gait: normal    Coordination   Romberg: negative  Finger to nose coordination: normal    Tremor   Resting tremor: absent    Reflexes   Right brachioradialis: 0  Left brachioradialis: 0  Right biceps: 0  Left biceps: 0  Right triceps: 0  Left triceps: 0  Right patellar: 0  Left patellar: 0  Right achilles: 0  Left achilles: 0  Right plantar: normal  Left plantar: normal  Right Crawford: absent  Left Crawford: absent  Right ankle clonus: absent  Left ankle clonus: absent      Physical Exam   Constitutional: She is oriented to person, place, and time. She appears well-developed and well-nourished.   HENT:   Head: Normocephalic and atraumatic.   Eyes: EOM are normal. Pupils are equal, round, and reactive to light.   Neck: Normal range of motion. Neck supple.   Cardiovascular: Normal rate and regular rhythm.    Pulmonary/Chest: Effort normal.   Abdominal: Soft.   Musculoskeletal: Normal range of motion.   Neurological: She is alert and oriented to person, place, and time. She has a normal Finger-Nose-Finger Test and a normal Romberg Test. Gait normal.   Reflex Scores:       Tricep reflexes are 0 on the right side and 0 on the left side.       Bicep reflexes are 0 on the right side and 0 on the left side.       Brachioradialis reflexes are 0 on the right side and 0 on the left side.       Patellar reflexes are 0 on the right side and 0 on the left side.       Achilles reflexes are 0 on the right side and 0 on the left side.  Skin: Skin is warm and dry.   Psychiatric: She has a normal mood and affect. Her speech is normal and  "behavior is normal. Judgment and thought content normal.   Nursing note and vitals reviewed.      Vital Signs  Pulse: 95  BP: 124/80  Pain Score: 10-Worst pain ever  Pain Loc: Back  Height and Weight  Height: 5' 2" (157.5 cm)  Weight: 69 kg (152 lb 1.9 oz)  BSA (Calculated - sq m): 1.74 sq meters  BMI (Calculated): 27.9  Weight in (lb) to have BMI = 25: 136.4]    Provider dictation:  I reviewed the imaging. She has disc degerneration at L4-L5 and L5-S1 with associated moderate lateral recess stenosis at these levels. There is right L5-S1 foraminal stenosis. She states that she has been having injections for low back pain when she was living in [place] with good relief but the series she had with Dr Knowles have been unsuccessful. She describes her pain as axial low back pain with some radiation to her right postero-lateral thigh above the knee and to her left leg in a non-dermatomal pattern. She also has significant diabetic neuropathy. She smokes and has no interest in quitting.    On exam, she has pain-limited B/L LE weakness in all muscle groups. She has stocking-distribution numbness and is areflexic.    I have explained that she is not a candidate for a fusion due to her smoking and poorly controlled diabetes and that I doubt she would have any significant pain relief from a simple decompression surgery. I recommended PT and lifestyle modification. She refused PT. F/U with Dr Knowles.    Visit Diagnosis:  DDD (degenerative disc disease), lumbar      "

## 2018-06-25 NOTE — TELEPHONE ENCOUNTER
----- Message from Uma Cleary sent at 6/25/2018  2:50 PM CDT -----  Contact: self  Patient [phone] is returning call/please call

## 2018-06-26 ENCOUNTER — TELEPHONE (OUTPATIENT)
Dept: FAMILY MEDICINE | Facility: CLINIC | Age: 58
End: 2018-06-26

## 2018-06-26 DIAGNOSIS — Z12.31 BREAST CANCER SCREENING BY MAMMOGRAM: Primary | ICD-10-CM

## 2018-06-27 DIAGNOSIS — M47.896 OTHER SPONDYLOSIS, LUMBAR REGION: Primary | ICD-10-CM

## 2018-06-28 ENCOUNTER — TELEPHONE (OUTPATIENT)
Dept: FAMILY MEDICINE | Facility: CLINIC | Age: 58
End: 2018-06-28

## 2018-06-28 DIAGNOSIS — N28.1 RENAL CYST: Primary | ICD-10-CM

## 2018-06-28 NOTE — TELEPHONE ENCOUNTER
----- Message from Leander JACOBS Kwasi sent at 6/28/2018  1:11 PM CDT -----  Contact: same  Patient called in and stated Dr. Márquez was supposed to schedule her for a renal ultrasound and that she had blood work scheduled by Dr. Márquez for the other day but did not stay to have it done because she did not fell well???    Patient would like a call back to discuss the ultrasound???  Call back number is [phone]

## 2018-06-28 NOTE — TELEPHONE ENCOUNTER
I do not remember anything about a renal ultrasound, and I do not see anything about it in my notes. He renal function is normal, so I do not know why she would want or need a renal ultrasound. I had wanted her to get labs done and see the hepatologist, which she has not done. There are notes that the hepatologist had wanted to do a fibroscan, which is possibly what she is remembering.

## 2018-06-29 ENCOUNTER — HOSPITAL ENCOUNTER (OUTPATIENT)
Dept: RADIOLOGY | Facility: CLINIC | Age: 58
Discharge: HOME | End: 2018-06-29
Attending: INTERNAL MEDICINE
Payer: MEDICARE

## 2018-06-29 DIAGNOSIS — N28.1 RENAL CYST: ICD-10-CM

## 2018-06-29 PROCEDURE — 76770 US EXAM ABDO BACK WALL COMP: CPT | Mod: 26,,, | Performed by: RADIOLOGY

## 2018-06-29 PROCEDURE — 76770 US EXAM ABDO BACK WALL COMP: CPT | Mod: TC,PO

## 2018-07-02 ENCOUNTER — TELEPHONE (OUTPATIENT)
Dept: FAMILY MEDICINE | Facility: CLINIC | Age: 58
End: 2018-07-02

## 2018-07-02 NOTE — LETTER
July 3, 2018    Mich JAVIER Eugene  [address] [address]             [facility]  [address]  [place] [facility] [address]  Phone: [phone]  Fax: [phone] Dear Mrs. Knight:    Please contact the office at your earliest convenience to follow up on your lab work.      If you have any questions or concerns, please don't hesitate to call.    Sincerely,        Kelsea Bird LPN

## 2018-07-02 NOTE — TELEPHONE ENCOUNTER
Please call patient about labs. As we expected, her A1c is elevated from how it was before. I would like to add another medication called Januvia. Please confirm pharmacy and allergies, let me know if patient has any questions.

## 2018-07-06 ENCOUNTER — PATIENT MESSAGE (OUTPATIENT)
Dept: ADMINISTRATIVE | Facility: HOSPITAL | Age: 58
End: 2018-07-06

## 2018-07-08 ENCOUNTER — PATIENT MESSAGE (OUTPATIENT)
Dept: SURGERY | Facility: AMBULARY SURGERY CENTER | Age: 58
End: 2018-07-08

## 2018-07-09 RX ORDER — TRAMADOL HYDROCHLORIDE 50 MG/1
50 TABLET ORAL EVERY 12 HOURS PRN
Qty: 60 TABLET | Refills: 1 | Status: SHIPPED | OUTPATIENT
Start: 2018-07-09 | End: 2018-09-04 | Stop reason: SDUPTHER

## 2018-07-10 ENCOUNTER — HOSPITAL ENCOUNTER (OUTPATIENT)
Facility: AMBULARY SURGERY CENTER | Age: 58
Discharge: HOME | End: 2018-07-10
Attending: ANESTHESIOLOGY | Admitting: ANESTHESIOLOGY
Payer: MEDICARE

## 2018-07-10 ENCOUNTER — SURGERY (OUTPATIENT)
Age: 58
End: 2018-07-10

## 2018-07-10 DIAGNOSIS — M47.896 OTHER SPONDYLOSIS, LUMBAR REGION: Primary | ICD-10-CM

## 2018-07-10 LAB — GLUCOSE SERPL-MCNC: 173 MG/DL (ref 70–110)

## 2018-07-10 PROCEDURE — 64495 INJ PARAVERT F JNT L/S 3 LEV: CPT | Mod: RT | Performed by: ANESTHESIOLOGY

## 2018-07-10 PROCEDURE — 64495 INJ PARAVERT F JNT L/S 3 LEV: CPT | Mod: 50,,, | Performed by: ANESTHESIOLOGY

## 2018-07-10 PROCEDURE — 64493 INJ PARAVERT F JNT L/S 1 LEV: CPT | Mod: RT | Performed by: ANESTHESIOLOGY

## 2018-07-10 PROCEDURE — 64493 INJ PARAVERT F JNT L/S 1 LEV: CPT | Mod: 50,,, | Performed by: ANESTHESIOLOGY

## 2018-07-10 PROCEDURE — 64494 INJ PARAVERT F JNT L/S 2 LEV: CPT | Mod: 50,,, | Performed by: ANESTHESIOLOGY

## 2018-07-10 PROCEDURE — 64494 INJ PARAVERT F JNT L/S 2 LEV: CPT | Mod: LT | Performed by: ANESTHESIOLOGY

## 2018-07-10 PROCEDURE — G8907 PT DOC NO EVENTS ON DISCHARG: HCPCS | Performed by: ANESTHESIOLOGY

## 2018-07-10 PROCEDURE — 99152 MOD SED SAME PHYS/QHP 5/>YRS: CPT | Mod: ,,, | Performed by: ANESTHESIOLOGY

## 2018-07-10 RX ORDER — SODIUM CHLORIDE, SODIUM LACTATE, POTASSIUM CHLORIDE, CALCIUM CHLORIDE 600; 310; 30; 20 MG/100ML; MG/100ML; MG/100ML; MG/100ML
INJECTION, SOLUTION INTRAVENOUS ONCE AS NEEDED
Status: DISCONTINUED | OUTPATIENT
Start: 2018-07-10 | End: 2018-07-10 | Stop reason: HOSPADM

## 2018-07-10 RX ORDER — BUPIVACAINE HYDROCHLORIDE 5 MG/ML
INJECTION, SOLUTION EPIDURAL; INTRACAUDAL
Status: DISCONTINUED | OUTPATIENT
Start: 2018-07-10 | End: 2018-07-10 | Stop reason: HOSPADM

## 2018-07-10 RX ADMIN — BUPIVACAINE HYDROCHLORIDE 5 ML: 5 INJECTION, SOLUTION EPIDURAL; INTRACAUDAL at 02:07

## 2018-07-10 NOTE — H&P (VIEW-ONLY)
"Referring Physician: No ref. provider found    PCP: Jannet Márquez MD    CC:R low back and R leg pain    Interval History:  Mich Knight is a 57 y.o. female with low back pain who presents today for f/u s/p bilateral L4-5 TFESI. Reports 60% relief for 1 week. Previous right sided L4-5 TFESI provided 50% relief for 4 days.  She is very disappointed because in the past in [place] she had good relief for 3-4 months with each MARILIA. She is also s/p right SIJ injection that provided her with very good relief for almost 2 weeks. Pain then returned to baseline. Pain begins in her low back, radiates primarily to her  buttock and down legs to above the knee.She also c/o pain in her mid back, neck , and UEs. The pain is described as throbbing, shooting, deep, exacerbated by standing, sitting, bending, and walking. She denies any leg weakness. No bowel or bladder incontinence. She takes Gabapentin 600 mg TID without benefit or SEs.She reports she can not tolerate PT. She tried in the past and could not get out of bed for 2 months. She has concomitant diabetic neuropathy. She states she was offered back surgery in 2009 and 2010 by 2 surgeons in [place] for "missing discs".  She rates her pain as a 10/10.     HPI:   Mich Knight is a 57 y.o. female with a history of low back pain who recently moved down from [place]. Within the last 2 years she has had progression of low back pain and was seeing a PM&R physician in [place] (Dr. Ramu Lugo in [place]). She was receiving epidurals every 4 months, which she states was helping her pain, with TPIs every month in between. The pain begins in her low back, radiates primarily to her R buttock and down the leg to above the knee. She rarely has pain that goes past her R knee. She has concomitant diabetic neuropathy for which she recently increased her gabapentin dose. The pain as described as throbbing, shooting, deep, exacerbated by standing, sitting, bending, and walking. She denies any leg " "weakness. No bowel or bladder incontinence. She rates her pain as a 10 today.     ROS:  CONSTITUTIONAL: No fevers, chills, night sweats, wt. loss, appetite changes  SKIN: no rashes or itching  ENT: No headaches, head trauma, vision changes, or eye pain  LYMPH NODES: None noticed   CV: No chest pain, palpitations.   RESP: No shortness of breath, dyspnea on exertion, cough, wheezing, or hemoptysis  GI: No nausea, emesis, diarrhea, constipation, melena, hematochezia, pain.    : No dysuria, hematuria, urgency, or frequency   HEME: No easy bruising, bleeding problems  PSYCHIATRIC: No anxiety, psychosis, hallucinations. +ve depression  NEURO: No seizures, memory loss, dizziness or difficulty sleeping  MSK: +ve per HPI      Past Medical History:   Diagnosis Date    DDD (degenerative disc disease), lumbar     Diabetes mellitus     Essential tremor     HLD (hyperlipidemia)     HTN (hypertension)      Past Surgical History:   Procedure Laterality Date    CHOLECYSTECTOMY       Family History   Problem Relation Age of Onset    Heart disease Mother     Macular degeneration Mother     Diabetes type II Father     Dementia Father     Heart disease Father      Social History     Social History    Marital status:      Spouse name: N/A    Number of children: N/A    Years of education: N/A     Social History Main Topics    Smoking status: Current Every Day Smoker     Packs/day: 0.50     Years: 40.00    Smokeless tobacco: Never Used    Alcohol use 0.0 - 3.0 oz/week    Drug use: No    Sexual activity: Not Currently     Other Topics Concern    None     Social History Narrative    Does not work, reports being disabled due to back pain. Previously was .          Medications/Allergies: See med card    Vitals:    06/12/18 0824   BP: 119/81   Pulse: 84   Weight: 65.8 kg (145 lb)   Height: 5' 3" (1.6 m)   PainSc: 10-Worst pain ever   PainLoc: Back         Physical exam:    GENERAL: A and O x3, the " patient appears well groomed and is in no acute distress.  Skin: No rashes or obvious lesions  HEENT: normocephalic, atraumatic  CARDIOVASCULAR:  RRR  LUNGS: non labored breathing  ABDOMEN: soft, nontender   UPPER EXTREMITIES: Normal alignment, normal range of motion, no atrophy, no skin changes,  hair growth and nail growth normal and equal bilaterally. No swelling, no tenderness.    LOWER EXTREMITIES:  Normal alignment, normal range of motion, no atrophy, no skin changes,  hair growth and nail growth normal and equal bilaterally. No swelling, no tenderness.    LUMBAR SPINE  Lumbar spine: ROM is full with flexion extension and oblique extension with  increased pain.    Levi's test causes increased pain on R side with bilateral maneuvers  Supine straight leg raise is negative bilaterally.    Internal and external rotation of the hip causes no increased pain on either side.  Myofascial exam: tenderness with palpation along lower lumbar region, +ve tenderness along R SI joint      MENTAL STATUS: normal orientation, speech, language, and fund of knowledge for social situation.  Emotional state appropriate.    CRANIAL NERVES:  II:  PERRL bilaterally,   III,IV,VI: EOMI.    V:  Facial sensation equal bilaterally  VII:  Facial motor function normal.  VIII:  Hearing equal to finger rub bilaterally  IX/X: Gag normal, palate symmetric  XI:  Shoulder shrug equal, head turn equal  XII:  Tongue midline without fasciculations      MOTOR: Tone and bulk: normal bilateral upper and lower Strength: normal   Delt Bi Tri WE WF     R 5 5 5 5 5 5   L 5 5 5 5 5 5     IP ADD ABD Quad TA Gas HAM  R 4 4 4 4 4 4 4  L 4 4 4 4 4 4 4    SENSATION: Light touch and pinprick intact bilaterally  REFLEXES: normal, symmetric, nonbrisk.  Toes down, no clonus. No hoffmans.  GAIT: normal rise, base, steps, and arm swing.        Imaging:  MRI L-spine 9/2016        L4-5, moderate disc desiccation.  Concentric disc bulge with small central  protrusion.  Left greater than right subarticular protrusion.  Moderate bilateral facet arthropathy.  Mild central canal stenosis.  Moderate advanced left foraminal stenosis.  L5-S1 advanced disc desiccation loss of disc height.  Broad-based disc protrusion to the right.  Moderate bilateral facet arthropathy.  There is advanced right foraminal stenosis.  A minimal left foraminal stenosis.    Assessment:  56 yo female with low back pain radiating to bilateral LEs  1. DDD (degenerative disc disease), lumbar    2. Facet arthropathy    3. Sacroiliac joint pain    4. Myalgia        Plan:  1. I have stressed the importance of physical activity and exercise to improve overall health  2. Continued low back pain may benefit from lumbar MBBs. She has known facet arthropathy at L4, 5. She declines  3. She requests a referral to neurosurgery. Referral placed. Updated MRI ordered  4. Recommend physical therapy. She declines.  5. Discussed current pain and the effect of expectations of pain relief and control on actual outcome. Pain disability index score was a 70/70, showing she has pretty poor insight into her condition.   6. F/u prn

## 2018-07-10 NOTE — OP NOTE
PROCEDURE DATE: 7/10/2018    PROCEDURE:  Bilateral L2,3,4,5 medial branch nerve blocks    DIAGNOSIS:  Other lumbar spondylosis    Post Op diagnosis: Same    PHYSICIAN: Charles Knowles MD    MEDICATIONS INJECTED: 0.5% bupivicaine, 0.5 ml at each level    SEDATION MEDICATIONS:RN IV Versed    LOCAL ANESTHETIC USED: None    ESTIMATED BLOOD LOSS:  None    COMPLICATIONS:  None    TECHNIQUE: A time out was taken to identify the patient, procedure and side of the procedure. The patient was placed in a prone position, then prepped and draped in the usual sterile fashion using ChloraPrep and sterile towels.  The levels were determined under fluoroscopic guidance and then marked.  A 25-gauge 3.5 inch needle was introduced to the anatomic location of the L2,3,4,5 medial branch nerves on the bilateral side. The above medication was then injected. The patient tolerated the procedure well.     The patient was monitored after the procedure. Patient was given pain diary to record pain levels at home.     If found to have greater than a 50% recovery and so will be scheduled for a radiofrequency ablation of the corresponding nerves.  Patient was given post procedure and discharge instructions to follow at home.  The patient was discharged in a stable condition.

## 2018-07-10 NOTE — DISCHARGE SUMMARY
[facility]  Discharge Note  Short Stay    Admit Date: 7/10/2018    Discharge Date and Time: 7/10/2018    Attending Physician: Charles Knowles MD     Discharge Provider: Charles Knowles    Diagnoses:  Active Hospital Problems    Diagnosis  POA    *Other spondylosis, lumbar region [M47.896]  Yes      Resolved Hospital Problems    Diagnosis Date Resolved POA   No resolved problems to display.       Hospital Course: Lumbar MBB  Discharged Condition: Good    Final Diagnoses:   Active Hospital Problems    Diagnosis  POA    *Other spondylosis, lumbar region [M47.896]  Yes      Resolved Hospital Problems    Diagnosis Date Resolved POA   No resolved problems to display.       Disposition: Home or Self Care    Follow up/Patient Instructions:    Medications:  Reconciled Home Medications:      Medication List      CHANGE how you take these medications    gabapentin 300 MG capsule  Commonly known as:  NEURONTIN  Take 2 capsules (600 mg total) by mouth 3 (three) times daily.  What changed:  how much to take        CONTINUE taking these medications    aspirin 81 MG EC tablet  Commonly known as:  ECOTRIN  Take 81 mg by mouth once daily.     glipiZIDE 10 MG Tr24  Commonly known as:  GLUCOTROL  Take 5 mg by mouth daily with breakfast.     lisinopril-hydrochlorothiazide 20-12.5 mg per tablet  Commonly known as:  PRINZIDE,ZESTORETIC  Take 1 tablet by mouth once daily.     metFORMIN 1000 MG tablet  Commonly known as:  GLUCOPHAGE  Take 1,000 mg by mouth 2 (two) times daily with meals.     traMADol 50 mg tablet  Commonly known as:  ULTRAM  Take 1 tablet (50 mg total) by mouth every 12 (twelve) hours as needed for Pain.            Discharge Procedure Orders  Call MD for:  temperature >100.4     Call MD for:  persistent nausea and vomiting or diarrhea     Call MD for:  severe uncontrolled pain     Call MD for:  redness, tenderness, or signs of infection (pain, swelling, redness, odor or green/yellow discharge around incision site)      Call MD for:  difficulty breathing or increased cough     Call MD for:  severe persistent headache          Follow up with MD in 2-3 weeks    Discharge Procedure Orders (must include Diet, Follow-up, Activity):    Discharge Procedure Orders (must include Diet, Follow-up, Activity)  Call MD for:  temperature >100.4     Call MD for:  persistent nausea and vomiting or diarrhea     Call MD for:  severe uncontrolled pain     Call MD for:  redness, tenderness, or signs of infection (pain, swelling, redness, odor or green/yellow discharge around incision site)     Call MD for:  difficulty breathing or increased cough     Call MD for:  severe persistent headache

## 2018-07-10 NOTE — PLAN OF CARE
Pt states ready to go home , stable, toy po fluids, ambulatory, denies pain, Leg raises performed in bed without diff and instructed on fall risk. PT  verbalized understanding

## 2018-07-11 VITALS
BODY MASS INDEX: 27.97 KG/M2 | WEIGHT: 152 LBS | SYSTOLIC BLOOD PRESSURE: 126 MMHG | TEMPERATURE: 97 F | RESPIRATION RATE: 18 BRPM | HEIGHT: 62 IN | OXYGEN SATURATION: 95 % | DIASTOLIC BLOOD PRESSURE: 75 MMHG | HEART RATE: 82 BPM

## 2018-07-12 ENCOUNTER — PATIENT MESSAGE (OUTPATIENT)
Dept: PAIN MEDICINE | Facility: CLINIC | Age: 58
End: 2018-07-12

## 2018-07-16 DIAGNOSIS — M47.896 OTHER SPONDYLOSIS, LUMBAR REGION: Primary | ICD-10-CM

## 2018-07-27 ENCOUNTER — HOSPITAL ENCOUNTER (OUTPATIENT)
Facility: AMBULARY SURGERY CENTER | Age: 58
Discharge: HOME | End: 2018-07-27
Attending: ANESTHESIOLOGY | Admitting: ANESTHESIOLOGY
Payer: MEDICARE

## 2018-07-27 ENCOUNTER — OFFICE VISIT (OUTPATIENT)
Dept: FAMILY MEDICINE | Facility: CLINIC | Age: 58
End: 2018-07-27
Payer: MEDICARE

## 2018-07-27 ENCOUNTER — SURGERY (OUTPATIENT)
Age: 58
End: 2018-07-27

## 2018-07-27 ENCOUNTER — TELEPHONE (OUTPATIENT)
Dept: HEPATOLOGY | Facility: CLINIC | Age: 58
End: 2018-07-27

## 2018-07-27 VITALS
WEIGHT: 148.81 LBS | HEART RATE: 108 BPM | SYSTOLIC BLOOD PRESSURE: 115 MMHG | TEMPERATURE: 99 F | BODY MASS INDEX: 27.38 KG/M2 | HEIGHT: 62 IN | DIASTOLIC BLOOD PRESSURE: 70 MMHG

## 2018-07-27 DIAGNOSIS — G89.4 CHRONIC PAIN SYNDROME: ICD-10-CM

## 2018-07-27 DIAGNOSIS — M47.896 OTHER SPONDYLOSIS, LUMBAR REGION: Primary | ICD-10-CM

## 2018-07-27 DIAGNOSIS — E11.29 TYPE 2 DIABETES MELLITUS WITH MICROALBUMINURIA, WITHOUT LONG-TERM CURRENT USE OF INSULIN: Primary | ICD-10-CM

## 2018-07-27 DIAGNOSIS — E11.59 HYPERTENSION ASSOCIATED WITH DIABETES: ICD-10-CM

## 2018-07-27 DIAGNOSIS — R80.9 TYPE 2 DIABETES MELLITUS WITH MICROALBUMINURIA, WITHOUT LONG-TERM CURRENT USE OF INSULIN: Primary | ICD-10-CM

## 2018-07-27 DIAGNOSIS — I15.2 HYPERTENSION ASSOCIATED WITH DIABETES: ICD-10-CM

## 2018-07-27 DIAGNOSIS — Z72.0 TOBACCO ABUSE: ICD-10-CM

## 2018-07-27 LAB — POCT GLUCOSE: 91 MG/DL (ref 70–110)

## 2018-07-27 PROCEDURE — 64636 DESTROY L/S FACET JNT ADDL: CPT | Mod: LT | Performed by: ANESTHESIOLOGY

## 2018-07-27 PROCEDURE — 64635 DESTROY LUMB/SAC FACET JNT: CPT | Mod: 50,,, | Performed by: ANESTHESIOLOGY

## 2018-07-27 PROCEDURE — 99999 PR PBB SHADOW E&M-EST. PATIENT-LVL III: CPT | Mod: PBBFAC,,, | Performed by: NURSE PRACTITIONER

## 2018-07-27 PROCEDURE — 64635 DESTROY LUMB/SAC FACET JNT: CPT | Mod: RT | Performed by: ANESTHESIOLOGY

## 2018-07-27 PROCEDURE — 99204 OFFICE O/P NEW MOD 45 MIN: CPT | Mod: S$PBB,,, | Performed by: NURSE PRACTITIONER

## 2018-07-27 PROCEDURE — 99213 OFFICE O/P EST LOW 20 MIN: CPT | Mod: PBBFAC,PO | Performed by: NURSE PRACTITIONER

## 2018-07-27 PROCEDURE — 99152 MOD SED SAME PHYS/QHP 5/>YRS: CPT | Mod: ,,, | Performed by: ANESTHESIOLOGY

## 2018-07-27 PROCEDURE — 64636 DESTROY L/S FACET JNT ADDL: CPT | Mod: 50,,, | Performed by: ANESTHESIOLOGY

## 2018-07-27 PROCEDURE — G8907 PT DOC NO EVENTS ON DISCHARG: HCPCS | Performed by: ANESTHESIOLOGY

## 2018-07-27 RX ORDER — LIDOCAINE HYDROCHLORIDE 20 MG/ML
INJECTION, SOLUTION EPIDURAL; INFILTRATION; INTRACAUDAL; PERINEURAL
Status: DISCONTINUED
Start: 2018-07-27 | End: 2018-07-27 | Stop reason: HOSPADM

## 2018-07-27 RX ORDER — FENTANYL CITRATE 50 UG/ML
INJECTION, SOLUTION INTRAMUSCULAR; INTRAVENOUS
Status: DISCONTINUED | OUTPATIENT
Start: 2018-07-27 | End: 2018-07-27 | Stop reason: HOSPADM

## 2018-07-27 RX ORDER — ATORVASTATIN CALCIUM 20 MG/1
20 TABLET, FILM COATED ORAL DAILY
Qty: 90 TABLET | Refills: 3 | Status: ON HOLD | OUTPATIENT
Start: 2018-07-27 | End: 2019-02-07

## 2018-07-27 RX ORDER — BUPIVACAINE HYDROCHLORIDE 2.5 MG/ML
INJECTION, SOLUTION EPIDURAL; INFILTRATION; INTRACAUDAL
Status: DISCONTINUED
Start: 2018-07-27 | End: 2018-07-27 | Stop reason: HOSPADM

## 2018-07-27 RX ORDER — METHYLPREDNISOLONE ACETATE 80 MG/ML
INJECTION, SUSPENSION INTRA-ARTICULAR; INTRALESIONAL; INTRAMUSCULAR; SOFT TISSUE
Status: DISCONTINUED | OUTPATIENT
Start: 2018-07-27 | End: 2018-07-27 | Stop reason: HOSPADM

## 2018-07-27 RX ORDER — LIDOCAINE HYDROCHLORIDE 10 MG/ML
INJECTION, SOLUTION EPIDURAL; INFILTRATION; INTRACAUDAL; PERINEURAL
Status: DISCONTINUED
Start: 2018-07-27 | End: 2018-07-27 | Stop reason: HOSPADM

## 2018-07-27 RX ORDER — METHYLPREDNISOLONE ACETATE 80 MG/ML
INJECTION, SUSPENSION INTRA-ARTICULAR; INTRALESIONAL; INTRAMUSCULAR; SOFT TISSUE
Status: DISCONTINUED
Start: 2018-07-27 | End: 2018-07-27 | Stop reason: HOSPADM

## 2018-07-27 RX ORDER — SODIUM CHLORIDE, SODIUM LACTATE, POTASSIUM CHLORIDE, CALCIUM CHLORIDE 600; 310; 30; 20 MG/100ML; MG/100ML; MG/100ML; MG/100ML
INJECTION, SOLUTION INTRAVENOUS ONCE AS NEEDED
Status: COMPLETED | OUTPATIENT
Start: 2018-07-27 | End: 2018-07-27

## 2018-07-27 RX ORDER — FENTANYL CITRATE 50 UG/ML
INJECTION, SOLUTION INTRAMUSCULAR; INTRAVENOUS
Status: DISCONTINUED
Start: 2018-07-27 | End: 2018-07-27 | Stop reason: HOSPADM

## 2018-07-27 RX ORDER — MIDAZOLAM HYDROCHLORIDE 1 MG/ML
INJECTION INTRAMUSCULAR; INTRAVENOUS
Status: DISCONTINUED
Start: 2018-07-27 | End: 2018-07-27 | Stop reason: HOSPADM

## 2018-07-27 RX ORDER — LIDOCAINE HYDROCHLORIDE 20 MG/ML
INJECTION, SOLUTION EPIDURAL; INFILTRATION; INTRACAUDAL; PERINEURAL
Status: DISCONTINUED | OUTPATIENT
Start: 2018-07-27 | End: 2018-07-27 | Stop reason: HOSPADM

## 2018-07-27 RX ORDER — LISINOPRIL AND HYDROCHLOROTHIAZIDE 12.5; 2 MG/1; MG/1
1 TABLET ORAL DAILY
Qty: 90 TABLET | Refills: 1 | Status: SHIPPED | OUTPATIENT
Start: 2018-07-27 | End: 2019-03-19

## 2018-07-27 RX ORDER — MIDAZOLAM HYDROCHLORIDE 2 MG/2ML
INJECTION, SOLUTION INTRAMUSCULAR; INTRAVENOUS
Status: DISCONTINUED | OUTPATIENT
Start: 2018-07-27 | End: 2018-07-27 | Stop reason: HOSPADM

## 2018-07-27 RX ORDER — LIDOCAINE HYDROCHLORIDE 10 MG/ML
INJECTION, SOLUTION EPIDURAL; INFILTRATION; INTRACAUDAL; PERINEURAL
Status: DISCONTINUED | OUTPATIENT
Start: 2018-07-27 | End: 2018-07-27 | Stop reason: HOSPADM

## 2018-07-27 RX ORDER — BUPIVACAINE HYDROCHLORIDE 2.5 MG/ML
INJECTION, SOLUTION EPIDURAL; INFILTRATION; INTRACAUDAL
Status: DISCONTINUED | OUTPATIENT
Start: 2018-07-27 | End: 2018-07-27 | Stop reason: HOSPADM

## 2018-07-27 RX ADMIN — METHYLPREDNISOLONE ACETATE 80 MG: 80 INJECTION, SUSPENSION INTRA-ARTICULAR; INTRALESIONAL; INTRAMUSCULAR; SOFT TISSUE at 01:07

## 2018-07-27 RX ADMIN — LIDOCAINE HYDROCHLORIDE 8 ML: 20 INJECTION, SOLUTION EPIDURAL; INFILTRATION; INTRACAUDAL; PERINEURAL at 01:07

## 2018-07-27 RX ADMIN — LIDOCAINE HYDROCHLORIDE 10 ML: 10 INJECTION, SOLUTION EPIDURAL; INFILTRATION; INTRACAUDAL; PERINEURAL at 01:07

## 2018-07-27 RX ADMIN — FENTANYL CITRATE 50 MCG: 50 INJECTION, SOLUTION INTRAMUSCULAR; INTRAVENOUS at 01:07

## 2018-07-27 RX ADMIN — BUPIVACAINE HYDROCHLORIDE 7 ML: 2.5 INJECTION, SOLUTION EPIDURAL; INFILTRATION; INTRACAUDAL at 01:07

## 2018-07-27 RX ADMIN — SODIUM CHLORIDE, SODIUM LACTATE, POTASSIUM CHLORIDE, CALCIUM CHLORIDE: 600; 310; 30; 20 INJECTION, SOLUTION INTRAVENOUS at 01:07

## 2018-07-27 RX ADMIN — MIDAZOLAM HYDROCHLORIDE 2 MG: 2 INJECTION, SOLUTION INTRAMUSCULAR; INTRAVENOUS at 01:07

## 2018-07-27 NOTE — PROGRESS NOTES
Subjective:       Patient ID: Mich Knight is a 58 y.o. female.    Chief Complaint: Establish Care    Ms. Knight presents to the clinic today to establish at this clinic.  She has history of hypertension and diabetes.   Diabetes is uncontrolled and she feels this is due to pain which is currently exacerbated.  She has procedure today with Dr. Knowles.  She is not willing to see diabetic education at this time.  She does not want to change any diabetes regimen until after her procedure so she can see if her glucose decreases with pain relief. She is a smoker, not willing to quit.  She stopped her statin because her lipids improved.  She has hepatitis C and states she is unable to travel to [place] for treatment.  She does not want to address any health maintenance at this time and states she wants to get blood sugars under control first.      Diabetes   She has type 2 diabetes mellitus. No MedicAlert identification noted. The initial diagnosis of diabetes was made 13 years ago. Hypoglycemia symptoms include dizziness, headaches, mood changes, nervousness/anxiousness, sweats and tremors. Pertinent negatives for hypoglycemia include no confusion, hunger, pallor, seizures, sleepiness or speech difficulty. Associated symptoms include blurred vision, fatigue, foot paresthesias, polydipsia, polyuria, visual change and weakness. Pertinent negatives for diabetes include no chest pain, no foot ulcerations, no polyphagia and no weight loss. Hypoglycemia complications include required assistance. Pertinent negatives for hypoglycemia complications include no blackouts, no hospitalization, no nocturnal hypoglycemia and no required glucagon injection. Symptoms are worsening. Diabetic complications include autonomic neuropathy, peripheral neuropathy, PVD and retinopathy. Pertinent negatives for diabetic complications include no CVA, heart disease, impotence or nephropathy. Risk factors for coronary artery disease include family  history, hypertension, obesity, sedentary lifestyle, stress and tobacco exposure. Current diabetic treatment includes diet and oral agent (dual therapy). She is compliant with treatment most of the time. Her weight is fluctuating minimally. She is following a generally healthy diet. When asked about meal planning, she reported none. She has not had a previous visit with a dietitian. She participates in exercise intermittently. She monitors blood glucose at home 1-2 x per day. Blood glucose monitoring compliance is fair. Her home blood glucose trend is fluctuating dramatically. She sees a podiatrist.Eye exam is current.     Review of Systems   Constitutional: Positive for fatigue. Negative for chills and weight loss.   HENT: Positive for congestion.    Eyes: Positive for blurred vision and visual disturbance.   Respiratory: Negative for cough, shortness of breath and wheezing.    Cardiovascular: Negative for chest pain.   Gastrointestinal: Negative for constipation and diarrhea.   Endocrine: Positive for polydipsia and polyuria. Negative for polyphagia.   Genitourinary: Negative for impotence.   Musculoskeletal: Positive for back pain.   Skin: Negative for pallor.   Allergic/Immunologic: Positive for environmental allergies.   Neurological: Positive for dizziness, tremors, weakness and headaches. Negative for seizures and speech difficulty.   Psychiatric/Behavioral: Negative for confusion. The patient is nervous/anxious.        Objective:      Physical Exam   Constitutional: She is oriented to person, place, and time. She appears well-nourished. No distress.   HENT:   Head: Normocephalic and atraumatic.   Right Ear: External ear normal.   Left Ear: External ear normal.   Mouth/Throat: Oropharynx is clear and moist. No oropharyngeal exudate.   Eyes: Pupils are equal, round, and reactive to light. Right eye exhibits no discharge. Left eye exhibits no discharge.   Neck: Neck supple. No thyromegaly present.    Cardiovascular: Normal rate and regular rhythm.  Exam reveals no gallop and no friction rub.    No murmur heard.  Pulmonary/Chest: Effort normal and breath sounds normal. No respiratory distress. She has no wheezes. She has no rales.   Abdominal: Soft. She exhibits no distension. There is no tenderness.   Lymphadenopathy:     She has no cervical adenopathy.   Neurological: She is alert and oriented to person, place, and time. She displays tremor. Coordination normal.   Skin: Skin is warm and dry.   Psychiatric: She has a normal mood and affect. Her behavior is normal. Thought content normal.   Vitals reviewed.          Current Outpatient Prescriptions:     aspirin (ECOTRIN) 81 MG EC tablet, Take 81 mg by mouth once daily., Disp: , Rfl:     gabapentin (NEURONTIN) 300 MG capsule, Take 2 capsules (600 mg total) by mouth 3 (three) times daily. (Patient taking differently: Take 900 mg by mouth 3 (three) times daily. ), Disp: 180 capsule, Rfl: 2    glipiZIDE (GLUCOTROL) 10 MG TR24, Take 5 mg by mouth daily with breakfast., Disp: , Rfl:     lisinopril-hydrochlorothiazide (PRINZIDE,ZESTORETIC) 20-12.5 mg per tablet, Take 1 tablet by mouth once daily., Disp: 30 tablet, Rfl: 3    metFORMIN (GLUCOPHAGE) 1000 MG tablet, Take 1,000 mg by mouth 2 (two) times daily with meals., Disp: , Rfl:     traMADol (ULTRAM) 50 mg tablet, Take 1 tablet (50 mg total) by mouth every 12 (twelve) hours as needed for Pain., Disp: 60 tablet, Rfl: 1    atorvastatin (LIPITOR) 20 MG tablet, Take 1 tablet (20 mg total) by mouth once daily., Disp: 90 tablet, Rfl: 3  Assessment:       1. Type 2 diabetes mellitus with microalbuminuria, without long-term current use of insulin    2. Hypertension associated with diabetes    3. Tobacco abuse    4. Chronic pain syndrome        Plan:       Type 2 diabetes mellitus with microalbuminuria, without long-term current use of insulin  Lab and follow up 2 mos.  Call/email if glucose still high even after pain  is improved.  Resume statin.  Advised this decreases risk of MI/Stroke.  -     Cancel: Ambulatory consult to Diabetic Education  -     Hemoglobin A1c; Future; Expected date: 07/27/2018  -     atorvastatin (LIPITOR) 20 MG tablet; Take 1 tablet (20 mg total) by mouth once daily.  Dispense: 90 tablet; Refill: 3    Hypertension associated with diabetes  Stable on current medication.    Tobacco abuse  Not willing to quit.  Discussed health risks of smoking.    Chronic pain syndrome  Procedure today with pain management.    Patient readiness: acceptance and barriers:readiness    During the course of the visit the patient was educated and counseled about the following:     Diabetes:  Discussed general issues about diabetes pathophysiology and management.  Hypertension:   Medication: no change.    Goals: Diabetes: Maintain Hemoglobin A1C below 7 and Hypertension: Reduce Blood Pressure    Did patient meet goals/outcomes: No    The following self management tools provided: declined    Patient Instructions (the written plan) was given to the patient/family.     Time spent with patient: 30 minutes    Barriers to medications present (yes )    Adverse reactions to current medications (no)    Over the counter medications reviewed (Yes)

## 2018-07-27 NOTE — H&P (VIEW-ONLY)
Subjective:       Patient ID: Mich Knight is a 58 y.o. female.    Chief Complaint: Establish Care    Ms. Knight presents to the clinic today to establish at this clinic.  She has history of hypertension and diabetes.   Diabetes is uncontrolled and she feels this is due to pain which is currently exacerbated.  She has procedure today with Dr. Knowles.  She is not willing to see diabetic education at this time.  She does not want to change any diabetes regimen until after her procedure so she can see if her glucose decreases with pain relief. She is a smoker, not willing to quit.  She stopped her statin because her lipids improved.  She has hepatitis C and states she is unable to travel to [place] for treatment.  She does not want to address any health maintenance at this time and states she wants to get blood sugars under control first.      Diabetes   She has type 2 diabetes mellitus. No MedicAlert identification noted. The initial diagnosis of diabetes was made 13 years ago. Hypoglycemia symptoms include dizziness, headaches, mood changes, nervousness/anxiousness, sweats and tremors. Pertinent negatives for hypoglycemia include no confusion, hunger, pallor, seizures, sleepiness or speech difficulty. Associated symptoms include blurred vision, fatigue, foot paresthesias, polydipsia, polyuria, visual change and weakness. Pertinent negatives for diabetes include no chest pain, no foot ulcerations, no polyphagia and no weight loss. Hypoglycemia complications include required assistance. Pertinent negatives for hypoglycemia complications include no blackouts, no hospitalization, no nocturnal hypoglycemia and no required glucagon injection. Symptoms are worsening. Diabetic complications include autonomic neuropathy, peripheral neuropathy, PVD and retinopathy. Pertinent negatives for diabetic complications include no CVA, heart disease, impotence or nephropathy. Risk factors for coronary artery disease include family  history, hypertension, obesity, sedentary lifestyle, stress and tobacco exposure. Current diabetic treatment includes diet and oral agent (dual therapy). She is compliant with treatment most of the time. Her weight is fluctuating minimally. She is following a generally healthy diet. When asked about meal planning, she reported none. She has not had a previous visit with a dietitian. She participates in exercise intermittently. She monitors blood glucose at home 1-2 x per day. Blood glucose monitoring compliance is fair. Her home blood glucose trend is fluctuating dramatically. She sees a podiatrist.Eye exam is current.     Review of Systems   Constitutional: Positive for fatigue. Negative for chills and weight loss.   HENT: Positive for congestion.    Eyes: Positive for blurred vision and visual disturbance.   Respiratory: Negative for cough, shortness of breath and wheezing.    Cardiovascular: Negative for chest pain.   Gastrointestinal: Negative for constipation and diarrhea.   Endocrine: Positive for polydipsia and polyuria. Negative for polyphagia.   Genitourinary: Negative for impotence.   Musculoskeletal: Positive for back pain.   Skin: Negative for pallor.   Allergic/Immunologic: Positive for environmental allergies.   Neurological: Positive for dizziness, tremors, weakness and headaches. Negative for seizures and speech difficulty.   Psychiatric/Behavioral: Negative for confusion. The patient is nervous/anxious.        Objective:      Physical Exam   Constitutional: She is oriented to person, place, and time. She appears well-nourished. No distress.   HENT:   Head: Normocephalic and atraumatic.   Right Ear: External ear normal.   Left Ear: External ear normal.   Mouth/Throat: Oropharynx is clear and moist. No oropharyngeal exudate.   Eyes: Pupils are equal, round, and reactive to light. Right eye exhibits no discharge. Left eye exhibits no discharge.   Neck: Neck supple. No thyromegaly present.    Cardiovascular: Normal rate and regular rhythm.  Exam reveals no gallop and no friction rub.    No murmur heard.  Pulmonary/Chest: Effort normal and breath sounds normal. No respiratory distress. She has no wheezes. She has no rales.   Abdominal: Soft. She exhibits no distension. There is no tenderness.   Lymphadenopathy:     She has no cervical adenopathy.   Neurological: She is alert and oriented to person, place, and time. She displays tremor. Coordination normal.   Skin: Skin is warm and dry.   Psychiatric: She has a normal mood and affect. Her behavior is normal. Thought content normal.   Vitals reviewed.          Current Outpatient Prescriptions:     aspirin (ECOTRIN) 81 MG EC tablet, Take 81 mg by mouth once daily., Disp: , Rfl:     gabapentin (NEURONTIN) 300 MG capsule, Take 2 capsules (600 mg total) by mouth 3 (three) times daily. (Patient taking differently: Take 900 mg by mouth 3 (three) times daily. ), Disp: 180 capsule, Rfl: 2    glipiZIDE (GLUCOTROL) 10 MG TR24, Take 5 mg by mouth daily with breakfast., Disp: , Rfl:     lisinopril-hydrochlorothiazide (PRINZIDE,ZESTORETIC) 20-12.5 mg per tablet, Take 1 tablet by mouth once daily., Disp: 30 tablet, Rfl: 3    metFORMIN (GLUCOPHAGE) 1000 MG tablet, Take 1,000 mg by mouth 2 (two) times daily with meals., Disp: , Rfl:     traMADol (ULTRAM) 50 mg tablet, Take 1 tablet (50 mg total) by mouth every 12 (twelve) hours as needed for Pain., Disp: 60 tablet, Rfl: 1    atorvastatin (LIPITOR) 20 MG tablet, Take 1 tablet (20 mg total) by mouth once daily., Disp: 90 tablet, Rfl: 3  Assessment:       1. Type 2 diabetes mellitus with microalbuminuria, without long-term current use of insulin    2. Hypertension associated with diabetes    3. Tobacco abuse    4. Chronic pain syndrome        Plan:       Type 2 diabetes mellitus with microalbuminuria, without long-term current use of insulin  Lab and follow up 2 mos.  Call/email if glucose still high even after pain  is improved.  Resume statin.  Advised this decreases risk of MI/Stroke.  -     Cancel: Ambulatory consult to Diabetic Education  -     Hemoglobin A1c; Future; Expected date: 07/27/2018  -     atorvastatin (LIPITOR) 20 MG tablet; Take 1 tablet (20 mg total) by mouth once daily.  Dispense: 90 tablet; Refill: 3    Hypertension associated with diabetes  Stable on current medication.    Tobacco abuse  Not willing to quit.  Discussed health risks of smoking.    Chronic pain syndrome  Procedure today with pain management.    Patient readiness: acceptance and barriers:readiness    During the course of the visit the patient was educated and counseled about the following:     Diabetes:  Discussed general issues about diabetes pathophysiology and management.  Hypertension:   Medication: no change.    Goals: Diabetes: Maintain Hemoglobin A1C below 7 and Hypertension: Reduce Blood Pressure    Did patient meet goals/outcomes: No    The following self management tools provided: declined    Patient Instructions (the written plan) was given to the patient/family.     Time spent with patient: 30 minutes    Barriers to medications present (yes )    Adverse reactions to current medications (no)    Over the counter medications reviewed (Yes)

## 2018-07-27 NOTE — PLAN OF CARE
States ready to go home, toy po fluids, deneis pain , able to hold legs off bed and stand, ambulated to waiting room to wait for friend to

## 2018-07-27 NOTE — TELEPHONE ENCOUNTER
----- Message from Franny Navarro MA sent at 7/27/2018  2:49 PM CDT -----  Regarding: FW: hep c treatment in [place]      ----- Message -----  From: ERNESTO Rachel-MIRELLA  Sent: 7/27/2018  10:02 AM  To: [facility] Staff  Subject: hep c treatment in [place]                       Hi,  This patient has canceled appointments with Hepatology because she is unable to travel to [place].  Any way to get her seen on the [facility]?  She said she can't get to [place] for any testing either.    Thanks,  Mary Alice Bowles

## 2018-07-27 NOTE — DISCHARGE SUMMARY
[facility]  Discharge Note  Short Stay    Admit Date: 7/27/2018    Discharge Date and Time: 7/27/2018    Attending Physician: Charles Knowles MD     Discharge Provider: Charles Knowles    Diagnoses:  Active Hospital Problems    Diagnosis  POA    *Other spondylosis, lumbar region [M47.896]  Yes      Resolved Hospital Problems    Diagnosis Date Resolved POA   No resolved problems to display.       Hospital Course: Lumbar MB RFA  Discharged Condition: Good    Final Diagnoses:   Active Hospital Problems    Diagnosis  POA    *Other spondylosis, lumbar region [M47.896]  Yes      Resolved Hospital Problems    Diagnosis Date Resolved POA   No resolved problems to display.       Disposition: Home or Self Care    Follow up/Patient Instructions:    Medications:  Reconciled Home Medications:      Medication List      CHANGE how you take these medications    gabapentin 300 MG capsule  Commonly known as:  NEURONTIN  Take 2 capsules (600 mg total) by mouth 3 (three) times daily.  What changed:  how much to take        CONTINUE taking these medications    aspirin 81 MG EC tablet  Commonly known as:  ECOTRIN  Take 81 mg by mouth once daily.     atorvastatin 20 MG tablet  Commonly known as:  LIPITOR  Take 1 tablet (20 mg total) by mouth once daily.     glipiZIDE 10 MG Tr24  Commonly known as:  GLUCOTROL  Take 5 mg by mouth daily with breakfast.     lisinopril-hydrochlorothiazide 20-12.5 mg per tablet  Commonly known as:  PRINZIDE,ZESTORETIC  Take 1 tablet by mouth once daily.     metFORMIN 1000 MG tablet  Commonly known as:  GLUCOPHAGE  Take 1,000 mg by mouth 2 (two) times daily with meals.     traMADol 50 mg tablet  Commonly known as:  ULTRAM  Take 1 tablet (50 mg total) by mouth every 12 (twelve) hours as needed for Pain.            Discharge Procedure Orders  Call MD for:  temperature >100.4     Call MD for:  persistent nausea and vomiting or diarrhea     Call MD for:  severe uncontrolled pain     Call MD for:  redness,  tenderness, or signs of infection (pain, swelling, redness, odor or green/yellow discharge around incision site)     Call MD for:  difficulty breathing or increased cough     Call MD for:  severe persistent headache          Follow up with MD in 2-3 weeks    Discharge Procedure Orders (must include Diet, Follow-up, Activity):    Discharge Procedure Orders (must include Diet, Follow-up, Activity)  Call MD for:  temperature >100.4     Call MD for:  persistent nausea and vomiting or diarrhea     Call MD for:  severe uncontrolled pain     Call MD for:  redness, tenderness, or signs of infection (pain, swelling, redness, odor or green/yellow discharge around incision site)     Call MD for:  difficulty breathing or increased cough     Call MD for:  severe persistent headache

## 2018-07-27 NOTE — OP NOTE
PROCEDURE DATE: 7/27/2018    PROCEDURE:  Radiofrequency ablation of the L2,3,4,5 medial branch nerves on the bilateral-side utilizing fluoroscopy    DIAGNOSIS:  Other lumbar spondylosis  Post op Diagnosis: Same    PHYSICIAN: Charles Knowles MD    MEDICATIONS INJECTED:  From a mixture of 6ml of 0.25% bupivicaine and 80mg of methylprednisone,  1ml of this solution was injected at each level.    LOCAL ANESTHETIC USED: Lidocaine 1%, 2 ml given at each site.    SEDATION MEDICATIONS: RN IV sedation    ESTIMATED BLOOD LOSS:  None    COMPLICATIONS:  None    TECHNIQUE:  A time out was taken to identify patient and procedure side prior to starting the procedure. Laying in a prone position, the patient was prepped and draped in the usual sterile fashion using ChloraPrep and sterile towels.  The levels were determined under fluoroscopic guidance and then marked.  Local anesthetic was given by raising a wheal at the skin over each site and then infiltrated approximately 2cm deeper.  A 20-gauge  100 mm RF needle was introduced to the anatomic location of the bilateral L2,3,4,5 medial branch nerves.  Motor stimulation up to 2 Volts at each level confirmed no motor nerve involvement.  Impedance was less than 800 ohms at each level.  1ml of 2% lidocaine was instilled prior to lesioning.  Ablation was performed per level utilizing radiofrequency generator 80°C for 60 seconds.  Needles were then rotated 90° and a second lesion was performed.  The above noted medication was then injected slowly. The patient tolerated the procedure well.     The patient was monitored after the procedure.  Patient was given post procedure and discharge instructions to follow at home.  The patient was discharged in a stable condition

## 2018-07-27 NOTE — DISCHARGE INSTRUCTIONS
Recovery After Procedural Sedation (Adult)  You have been given medicine by vein to make you sleep during your surgery. This may have included both a pain medicine and sleeping medicine. Most of the effects have worn off. But you may still have some drowsiness for the next 6 to 8 hours.  Home care  Follow these guidelines when you get home:  · For the next 8 hours, you should be watched by a responsible adult. This person should make sure your condition is not getting worse.  · Don't drink any alcohol for the next 24 hours.  · Don't drive, operate dangerous machinery, or make important business or personal decisions during the next 24 hours.  Note: Your healthcare provider may tell you not to take any medicine by mouth for pain or sleep in the next 4 hours. These medicines may react with the medicines you were given in the hospital. This could cause a much stronger response than usual.  Follow-up care  Follow up with your healthcare provider if you are not alert and back to your usual level of activity within 12 hours.  When to seek medical advice  Call your healthcare provider right away if any of these occur:  · Drowsiness gets worse  · Weakness or dizziness gets worse  · Repeated vomiting  · You can't be awakened   Date Last Reviewed: 10/18/2016  © 0697-8637 [employer]. [address] [place] [address], [place] [address]. All rights reserved. This information is not intended as a substitute for professional medical care. Always follow your healthcare professional's instructions.         Radiofrequency of Nerves    After this procedure you may have increased pain for three days and lingering pain for up to 6 weeks.   Most patients feel better after 4-6  weeks.    Use your pain medications as needed but the goal of this treartment is to wean you off your pain medication.  You may have weakness after the procedure due to the numbing injection.  You may feel a sunburn effect and some patients may need a burn  cream for the skin.    Use ice every 10 minutes every hour for up to 2 days  Do not use a heating pad or take tub baths or swim for 2 days.  You may shower today  Gradually increase your activities.  Dont lift anything over 10 lbs for the first 24 hours  Dont drive the day of the procedure.  Wait until tomorrow to resume any blood thinners (aspirin, Plavix, Coumadin) but you may resume all your other medications today.  If Diabetic, monitor you glucose carefully due to steroids  used for this procedure    Seek Medical Attention if you have:  Severe pain or headache  Fever or chills  Redness or swelling around the injection site   Difficulty breathing  Vomiting or Increasing numbness or weakness in arms or legs

## 2018-07-31 VITALS
HEART RATE: 80 BPM | SYSTOLIC BLOOD PRESSURE: 96 MMHG | RESPIRATION RATE: 20 BRPM | HEIGHT: 62 IN | DIASTOLIC BLOOD PRESSURE: 63 MMHG | BODY MASS INDEX: 27.97 KG/M2 | WEIGHT: 152 LBS | TEMPERATURE: 98 F | OXYGEN SATURATION: 97 %

## 2018-08-05 ENCOUNTER — PATIENT MESSAGE (OUTPATIENT)
Dept: FAMILY MEDICINE | Facility: CLINIC | Age: 58
End: 2018-08-05

## 2018-08-26 ENCOUNTER — HOSPITAL ENCOUNTER (EMERGENCY)
Facility: HOSPITAL | Age: 58
Discharge: HOME | End: 2018-08-26
Attending: EMERGENCY MEDICINE
Payer: MEDICARE

## 2018-08-26 VITALS
WEIGHT: 145 LBS | TEMPERATURE: 98 F | RESPIRATION RATE: 18 BRPM | OXYGEN SATURATION: 98 % | BODY MASS INDEX: 25.69 KG/M2 | HEART RATE: 94 BPM | SYSTOLIC BLOOD PRESSURE: 146 MMHG | HEIGHT: 63 IN | DIASTOLIC BLOOD PRESSURE: 82 MMHG

## 2018-08-26 DIAGNOSIS — L03.012 PARONYCHIA OF LEFT MIDDLE FINGER: Primary | ICD-10-CM

## 2018-08-26 PROCEDURE — 10060 I&D ABSCESS SIMPLE/SINGLE: CPT

## 2018-08-26 PROCEDURE — 99283 EMERGENCY DEPT VISIT LOW MDM: CPT

## 2018-08-26 PROCEDURE — 25000003 PHARM REV CODE 250: Performed by: EMERGENCY MEDICINE

## 2018-08-26 RX ORDER — MUPIROCIN 20 MG/G
OINTMENT TOPICAL 3 TIMES DAILY
Qty: 30 G | Refills: 0 | Status: SHIPPED | OUTPATIENT
Start: 2018-08-26 | End: 2018-08-26 | Stop reason: SDUPTHER

## 2018-08-26 RX ORDER — LIDOCAINE HYDROCHLORIDE 10 MG/ML
1 INJECTION INFILTRATION; PERINEURAL
Status: COMPLETED | OUTPATIENT
Start: 2018-08-26 | End: 2018-08-26

## 2018-08-26 RX ORDER — MUPIROCIN 20 MG/G
OINTMENT TOPICAL 3 TIMES DAILY
Qty: 30 G | Refills: 0 | Status: ON HOLD | OUTPATIENT
Start: 2018-08-26 | End: 2019-02-07

## 2018-08-26 RX ORDER — MUPIROCIN 20 MG/G
1 OINTMENT TOPICAL
Status: COMPLETED | OUTPATIENT
Start: 2018-08-26 | End: 2018-08-26

## 2018-08-26 RX ADMIN — LIDOCAINE HYDROCHLORIDE 1 ML: 10 INJECTION, SOLUTION INFILTRATION; PERINEURAL at 08:08

## 2018-08-26 RX ADMIN — MUPIROCIN 22 G: 20 OINTMENT TOPICAL at 08:08

## 2018-08-26 NOTE — ED PROVIDER NOTES
"Encounter Date: 8/26/2018    SCRIBE #1 NOTE: I, Geena Ashley, am scribing for, and in the presence of, Dr. Cancino .       History     Chief Complaint   Patient presents with    paronychia       Time seen by provider: 8:17 AM on 08/26/2018    Mich Knight is a 58 y.o. female with a PMHx of HLD, HTN, and DM who presents to the ED with complaints of left 3rd digit pain with associated redness, sleep disturbance, and swelling with an onset x 3 days. The patient relays that after "pulling on her finger nail" several days ago she noticed some redness surrounding the nailbed. Within the past three days, the finger began to "look infected" and became very painful. She states that the pain starts at the fingernail and goes up her arm. She denies fever. Patient has had no relief with ice or her Gabapentin. She does report that her glucose levels have been high recently, but does not specify a value. Lastly, she complains that the pain is "so bad" that she has not been able to sleep in 3 days. She denies any relief.         The history is provided by the patient.     Review of patient's allergies indicates:  No Known Allergies  Past Medical History:   Diagnosis Date    DDD (degenerative disc disease), lumbar     Diabetes mellitus     Essential tremor     HLD (hyperlipidemia)     HTN (hypertension)      Past Surgical History:   Procedure Laterality Date    CHOLECYSTECTOMY       Family History   Problem Relation Age of Onset    Heart disease Mother     Macular degeneration Mother     Diabetes type II Father     Dementia Father     Heart disease Father      Social History     Tobacco Use    Smoking status: Current Every Day Smoker     Packs/day: 0.50     Years: 40.00     Pack years: 20.00    Smokeless tobacco: Never Used   Substance Use Topics    Alcohol use: Yes     Alcohol/week: 0.0 - 3.0 oz    Drug use: No     Review of Systems   Constitutional: Negative for activity change, appetite change, chills, " fatigue and fever.   Eyes: Negative for visual disturbance.   Respiratory: Negative for apnea and shortness of breath.    Cardiovascular: Negative for chest pain and palpitations.   Gastrointestinal: Negative for abdominal distention and abdominal pain.   Genitourinary: Negative for difficulty urinating.   Musculoskeletal: Negative for neck pain.        Positive left 3rd digit pain   Skin: Negative for pallor and rash.        Positive redness and edema    Neurological: Negative for headaches.   Hematological: Does not bruise/bleed easily.   Psychiatric/Behavioral: Positive for sleep disturbance. Negative for agitation.       Physical Exam     Initial Vitals [08/26/18 0759]   BP Pulse Resp Temp SpO2   (!) 146/82 94 18 98 °F (36.7 °C) 98 %      MAP       --         Physical Exam    Nursing note and vitals reviewed.  Constitutional: She appears well-developed and well-nourished. She is not diaphoretic. No distress.   HENT:   Head: Normocephalic and atraumatic.   Eyes: EOM are normal. Pupils are equal, round, and reactive to light.   Neck: Normal range of motion. Neck supple.   Cardiovascular: Normal rate, regular rhythm, normal heart sounds and intact distal pulses. Exam reveals no gallop and no friction rub.    No murmur heard.  Pulmonary/Chest: Breath sounds normal. No respiratory distress. She has no wheezes. She has no rhonchi. She has no rales.   Abdominal: Soft. Bowel sounds are normal. There is no tenderness. There is no rebound and no guarding.   Musculoskeletal: Normal range of motion.   Neurological: She is alert and oriented to person, place, and time.   Skin: Skin is warm and dry. There is erythema.   There is erythema, edema, and a yellow discoloration around the fingernail of the left 3rd digit.    Psychiatric: She has a normal mood and affect. Her behavior is normal. Judgment and thought content normal.         ED Course   I & D - Incision and Drainage  Performed by: Senthil Cancino MD  Authorized by:  Senthil Cancino MD   Consent Done: Yes  Consent: Verbal consent obtained.  Risks and benefits: risks, benefits and alternatives were discussed  Consent given by: patient  Type: abscess  Body area: upper extremity  Location details: left long finger  Anesthesia: digital block    Anesthesia:  Local Anesthetic: lidocaine 1% without epinephrine  Anesthetic total: 5 mL  Patient sedated: no  Scalpel size: 10  Complexity: simple  Drainage: pus  Drainage amount: moderate  Patient tolerance: Patient tolerated the procedure well with no immediate complications        Labs Reviewed - No data to display       Imaging Results    None          Medical Decision Making:   History:   Old Medical Records: I decided to obtain old medical records.  Initial Assessment:   58-year-old female presented with a chief complaint of finger pain.  Differential Diagnosis:   My differential diagnosis includes cellulitis, felon, paronychia and abscess   ED Management:  The patient was urgently evaluated in the emergency department, her evaluation was significant for middle-age female with a paronychia noted on the patient did receive an incision drainage of the paronychia and tolerated the procedure well she is stable for discharge to home.  She will be discharged home with topical Bactroban and she is to follow up with her PCP for further care.            Scribe Attestation:   Scribe #1: I performed the above scribed service and the documentation accurately describes the services I performed. I attest to the accuracy of the note.        I, Dr. Senthil Cancino, personally performed the services described in this documentation. All medical record entries made by the scribe were at my direction and in my presence.  I have reviewed the chart and agree that the record reflects my personal performance and is accurate and complete. Senthil Cancino MD.  3:15 PM 08/26/2018          Clinical Impression:   The encounter diagnosis was Paronychia of left  middle finger.      Disposition:   Disposition: Discharged  Condition: Stable                        Senthil Cancino MD  08/26/18 [id number]

## 2018-09-04 ENCOUNTER — PATIENT MESSAGE (OUTPATIENT)
Dept: PAIN MEDICINE | Facility: CLINIC | Age: 58
End: 2018-09-04

## 2018-09-04 RX ORDER — TRAMADOL HYDROCHLORIDE 50 MG/1
50 TABLET ORAL EVERY 12 HOURS PRN
Qty: 60 TABLET | Refills: 1 | Status: SHIPPED | OUTPATIENT
Start: 2018-09-04 | End: 2018-10-29 | Stop reason: SDUPTHER

## 2018-09-04 RX ORDER — GABAPENTIN 300 MG/1
900 CAPSULE ORAL 3 TIMES DAILY
Qty: 270 CAPSULE | Refills: 2 | Status: SHIPPED | OUTPATIENT
Start: 2018-09-04 | End: 2019-03-19

## 2018-09-07 ENCOUNTER — OFFICE VISIT (OUTPATIENT)
Dept: PAIN MEDICINE | Facility: CLINIC | Age: 58
End: 2018-09-07
Payer: MEDICARE

## 2018-09-07 VITALS
HEART RATE: 87 BPM | SYSTOLIC BLOOD PRESSURE: 130 MMHG | BODY MASS INDEX: 25.69 KG/M2 | WEIGHT: 145 LBS | DIASTOLIC BLOOD PRESSURE: 84 MMHG | HEIGHT: 63 IN

## 2018-09-07 DIAGNOSIS — M79.18 MYOFASCIAL PAIN: ICD-10-CM

## 2018-09-07 DIAGNOSIS — M54.16 LUMBAR RADICULOPATHY: ICD-10-CM

## 2018-09-07 DIAGNOSIS — M46.1 SACROILIITIS: ICD-10-CM

## 2018-09-07 DIAGNOSIS — M47.819 FACET ARTHROPATHY: ICD-10-CM

## 2018-09-07 DIAGNOSIS — M51.36 DDD (DEGENERATIVE DISC DISEASE), LUMBAR: ICD-10-CM

## 2018-09-07 DIAGNOSIS — M51.36 DDD (DEGENERATIVE DISC DISEASE), LUMBAR: Primary | ICD-10-CM

## 2018-09-07 DIAGNOSIS — M79.10 MYALGIA: Primary | ICD-10-CM

## 2018-09-07 PROCEDURE — 99213 OFFICE O/P EST LOW 20 MIN: CPT | Mod: PBBFAC,PN | Performed by: PHYSICIAN ASSISTANT

## 2018-09-07 PROCEDURE — 99999 PR PBB SHADOW E&M-EST. PATIENT-LVL III: CPT | Mod: PBBFAC,,, | Performed by: PHYSICIAN ASSISTANT

## 2018-09-07 PROCEDURE — 99213 OFFICE O/P EST LOW 20 MIN: CPT | Mod: S$PBB,,, | Performed by: PHYSICIAN ASSISTANT

## 2018-09-07 NOTE — PROGRESS NOTES
"Referring Physician: No ref. provider found    PCP: Jannet Márquez MD    CC:R low back and R leg pain    Interval History:  Mich Knight is a 58 y.o. female with low back pain who presents today for f/u s/p lumbar MB RFA at L3-5 bilaterally. Reprots 60% imrpovment in low back pain. She c/o bilateral lateral low and mid back pain. This is a long term issue. Previous bilateral L4-5 TFESI provided only 60% relief for 1 week. Previous right sided L4-5 TFESI provided 50% relief for 4 days.  She continues to be very disappointed because in the past in [place] she had good relief for 3-4 months with each MARILIA. She is also s/p right SIJ injection that provided her with very good relief for almost 2 weeks. Pain then returned to baseline. She also c/o pain in her mid back, neck , and UEs. The pain is described as throbbing, shooting, deep, exacerbated by standing, sitting, bending, and walking. She denies any leg weakness. No bowel or bladder incontinence. She takes Gabapentin 600 mg TID without benefit or SEs.She reports she can not tolerate PT. She tried in the past and could not get out of bed for 2 months. She has concomitant diabetic neuropathy. She was evaluated by Dr. Martinez who did not recommend surgery. She states she was offered back surgery in 2009 and 2010 by 2 surgeons in [place] for "missing discs".  She rates her pain as a 10/10.     HPI:   Mich Knight is a 58 y.o. female with a history of low back pain who recently moved down from [place]. Within the last 2 years she has had progression of low back pain and was seeing a PM&R physician in [place] (Dr. Ramu Lugo in [place]). She was receiving epidurals every 4 months, which she states was helping her pain, with TPIs every month in between. The pain begins in her low back, radiates primarily to her R buttock and down the leg to above the knee. She rarely has pain that goes past her R knee. She has concomitant diabetic neuropathy for which she recently " increased her gabapentin dose. The pain as described as throbbing, shooting, deep, exacerbated by standing, sitting, bending, and walking. She denies any leg weakness. No bowel or bladder incontinence. She rates her pain as a 10 today.     ROS:  CONSTITUTIONAL: No fevers, chills, night sweats, wt. loss, appetite changes  SKIN: no rashes or itching  ENT: No headaches, head trauma, vision changes, or eye pain  LYMPH NODES: None noticed   CV: No chest pain, palpitations.   RESP: No shortness of breath, dyspnea on exertion, cough, wheezing, or hemoptysis  GI: No nausea, emesis, diarrhea, constipation, melena, hematochezia, pain.    : No dysuria, hematuria, urgency, or frequency   HEME: No easy bruising, bleeding problems  PSYCHIATRIC: No anxiety, psychosis, hallucinations. +ve depression  NEURO: No seizures, memory loss, dizziness or difficulty sleeping  MSK: +ve per HPI      Past Medical History:   Diagnosis Date    DDD (degenerative disc disease), lumbar     Diabetes mellitus     Essential tremor     HLD (hyperlipidemia)     HTN (hypertension)      Past Surgical History:   Procedure Laterality Date    CHOLECYSTECTOMY       Family History   Problem Relation Age of Onset    Heart disease Mother     Macular degeneration Mother     Diabetes type II Father     Dementia Father     Heart disease Father      Social History     Socioeconomic History    Marital status:      Spouse name: None    Number of children: None    Years of education: None    Highest education level: None   Social Needs    Financial resource strain: None    Food insecurity - worry: None    Food insecurity - inability: None    Transportation needs - medical: None    Transportation needs - non-medical: None   Occupational History    None   Tobacco Use    Smoking status: Current Every Day Smoker     Packs/day: 0.50     Years: 40.00     Pack years: 20.00    Smokeless tobacco: Never Used   Substance and Sexual Activity     "Alcohol use: Yes     Alcohol/week: 0.0 - 3.0 oz    Drug use: No    Sexual activity: Not Currently   Other Topics Concern    None   Social History Narrative    Does not work, reports being disabled due to back pain. Previously was .          Medications/Allergies: See med card    Vitals:    09/07/18 1107   BP: 130/84   Pulse: 87   Weight: 65.8 kg (145 lb)   Height: 5' 3" (1.6 m)   PainSc:   8   PainLoc: Back         Physical exam:    GENERAL: A and O x3, the patient appears well groomed and is in no acute distress.  Skin: No rashes or obvious lesions  HEENT: normocephalic, atraumatic  CARDIOVASCULAR:  RRR  LUNGS: non labored breathing  ABDOMEN: soft, nontender   UPPER EXTREMITIES: Normal alignment, normal range of motion, no atrophy, no skin changes,  hair growth and nail growth normal and equal bilaterally. No swelling, no tenderness.    LOWER EXTREMITIES:  Normal alignment, normal range of motion, no atrophy, no skin changes,  hair growth and nail growth normal and equal bilaterally. No swelling, no tenderness.    LUMBAR SPINE  Lumbar spine: ROM is full with flexion extension and oblique extension with  increased pain.    Levi's test causes increased pain on R side with bilateral maneuvers  Supine straight leg raise is negative bilaterally.    Internal and external rotation of the hip causes no increased pain on either side.  Myofascial exam: severe tenderness with palpation along lower lumbar region, +ve tenderness along R SI joint      MENTAL STATUS: normal orientation, speech, language, and fund of knowledge for social situation.  Emotional state appropriate.    CRANIAL NERVES:  II:  PERRL bilaterally,   III,IV,VI: EOMI.    V:  Facial sensation equal bilaterally  VII:  Facial motor function normal.  VIII:  Hearing equal to finger rub bilaterally  IX/X: Gag normal, palate symmetric  XI:  Shoulder shrug equal, head turn equal  XII:  Tongue midline without fasciculations      MOTOR: Tone and " bulk: normal bilateral upper and lower Strength: normal   Delt Bi Tri WE WF     R 5 5 5 5 5 5   L 5 5 5 5 5 5     IP ADD ABD Quad TA Gas HAM  R 4 4 4 4 4 4 4  L 4 4 4 4 4 4 4    SENSATION: Light touch and pinprick intact bilaterally  REFLEXES: normal, symmetric, nonbrisk.  Toes down, no clonus. No hoffmans.  GAIT: normal rise, base, steps, and arm swing.        Imaging:  MRI L-spine 9/2016        L4-5, moderate disc desiccation.  Concentric disc bulge with small central protrusion.  Left greater than right subarticular protrusion.  Moderate bilateral facet arthropathy.  Mild central canal stenosis.  Moderate advanced left foraminal stenosis.  L5-S1 advanced disc desiccation loss of disc height.  Broad-based disc protrusion to the right.  Moderate bilateral facet arthropathy.  There is advanced right foraminal stenosis.  A minimal left foraminal stenosis.    Assessment:  56 yo female with   1. DDD (degenerative disc disease), lumbar    2. Myofascial pain    3. Lumbar radiculopathy    4. Sacroiliitis        Plan:  1. I have stressed the importance of physical activity and exercise to improve overall health  2. Monitor progress of lumbar MB RFA  3. Ordered PT to focus on low back. Will be beneifical to prevent further muscle atrophy and increase mobility and strength   4. F/u prn

## 2018-09-25 ENCOUNTER — PATIENT MESSAGE (OUTPATIENT)
Dept: FAMILY MEDICINE | Facility: CLINIC | Age: 58
End: 2018-09-25

## 2018-09-25 ENCOUNTER — TELEPHONE (OUTPATIENT)
Dept: HEPATOLOGY | Facility: CLINIC | Age: 58
End: 2018-09-25

## 2018-09-25 NOTE — TELEPHONE ENCOUNTER
Patient missed scheduled appt with PA Scheuermann.  Attempt made to reach her by phone.  Letter sent asking that she contact us for rescheduling.

## 2018-09-26 ENCOUNTER — PATIENT MESSAGE (OUTPATIENT)
Dept: FAMILY MEDICINE | Facility: CLINIC | Age: 58
End: 2018-09-26

## 2018-10-12 ENCOUNTER — OFFICE VISIT (OUTPATIENT)
Dept: FAMILY MEDICINE | Facility: CLINIC | Age: 58
End: 2018-10-12
Payer: MEDICARE

## 2018-10-12 ENCOUNTER — LAB VISIT (OUTPATIENT)
Dept: LAB | Facility: HOSPITAL | Age: 58
End: 2018-10-12
Attending: NURSE PRACTITIONER
Payer: MEDICARE

## 2018-10-12 VITALS
TEMPERATURE: 99 F | WEIGHT: 145.5 LBS | DIASTOLIC BLOOD PRESSURE: 75 MMHG | SYSTOLIC BLOOD PRESSURE: 110 MMHG | HEIGHT: 63 IN | HEART RATE: 83 BPM | BODY MASS INDEX: 25.78 KG/M2

## 2018-10-12 DIAGNOSIS — Z72.0 TOBACCO ABUSE: ICD-10-CM

## 2018-10-12 DIAGNOSIS — E11.65 UNCONTROLLED TYPE 2 DIABETES MELLITUS WITH HYPERGLYCEMIA: ICD-10-CM

## 2018-10-12 DIAGNOSIS — E78.5 HYPERLIPIDEMIA, UNSPECIFIED HYPERLIPIDEMIA TYPE: ICD-10-CM

## 2018-10-12 DIAGNOSIS — E11.59 HYPERTENSION ASSOCIATED WITH DIABETES: ICD-10-CM

## 2018-10-12 DIAGNOSIS — E11.65 UNCONTROLLED TYPE 2 DIABETES MELLITUS WITH HYPERGLYCEMIA: Primary | ICD-10-CM

## 2018-10-12 DIAGNOSIS — I15.2 HYPERTENSION ASSOCIATED WITH DIABETES: ICD-10-CM

## 2018-10-12 LAB
ALBUMIN SERPL BCP-MCNC: 3.4 G/DL
ALP SERPL-CCNC: 57 U/L
ALT SERPL W/O P-5'-P-CCNC: 29 U/L
ANION GAP SERPL CALC-SCNC: 12 MMOL/L
AST SERPL-CCNC: 29 U/L
BILIRUB SERPL-MCNC: 0.3 MG/DL
BUN SERPL-MCNC: 26 MG/DL
CALCIUM SERPL-MCNC: 10.1 MG/DL
CHLORIDE SERPL-SCNC: 99 MMOL/L
CO2 SERPL-SCNC: 23 MMOL/L
CREAT SERPL-MCNC: 1.4 MG/DL
EST. GFR  (AFRICAN AMERICAN): 48.1 ML/MIN/1.73 M^2
EST. GFR  (NON AFRICAN AMERICAN): 41.7 ML/MIN/1.73 M^2
ESTIMATED AVG GLUCOSE: 192 MG/DL
GLUCOSE SERPL-MCNC: 358 MG/DL
HBA1C MFR BLD HPLC: 8.3 %
POTASSIUM SERPL-SCNC: 3.8 MMOL/L
PROT SERPL-MCNC: 7.9 G/DL
SODIUM SERPL-SCNC: 134 MMOL/L

## 2018-10-12 PROCEDURE — 83036 HEMOGLOBIN GLYCOSYLATED A1C: CPT

## 2018-10-12 PROCEDURE — 80053 COMPREHEN METABOLIC PANEL: CPT

## 2018-10-12 PROCEDURE — 36415 COLL VENOUS BLD VENIPUNCTURE: CPT | Mod: PO

## 2018-10-12 PROCEDURE — 90732 PPSV23 VACC 2 YRS+ SUBQ/IM: CPT | Mod: PBBFAC,PO

## 2018-10-12 PROCEDURE — 99215 OFFICE O/P EST HI 40 MIN: CPT | Mod: PBBFAC,PO | Performed by: NURSE PRACTITIONER

## 2018-10-12 PROCEDURE — 99214 OFFICE O/P EST MOD 30 MIN: CPT | Mod: S$PBB,,, | Performed by: NURSE PRACTITIONER

## 2018-10-12 PROCEDURE — 99999 PR PBB SHADOW E&M-EST. PATIENT-LVL V: CPT | Mod: PBBFAC,,, | Performed by: NURSE PRACTITIONER

## 2018-10-12 RX ORDER — GLIPIZIDE 5 MG/1
5 TABLET, FILM COATED, EXTENDED RELEASE ORAL
Qty: 90 TABLET | Refills: 1 | Status: SHIPPED | OUTPATIENT
Start: 2018-10-12 | End: 2019-03-19

## 2018-10-12 RX ORDER — METFORMIN HYDROCHLORIDE 1000 MG/1
1000 TABLET ORAL 2 TIMES DAILY WITH MEALS
Qty: 180 TABLET | Refills: 1 | Status: SHIPPED | OUTPATIENT
Start: 2018-10-12 | End: 2019-08-14 | Stop reason: SDUPTHER

## 2018-10-12 RX ORDER — PEN NEEDLE, DIABETIC 30 GX3/16"
1 NEEDLE, DISPOSABLE MISCELLANEOUS WEEKLY
Qty: 30 EACH | Refills: 5 | Status: ON HOLD | OUTPATIENT
Start: 2018-10-12 | End: 2019-02-07

## 2018-10-12 NOTE — MEDICAL/APP STUDENT
Ms Knight is a 57yo female patient of Dr Márquez here today for follow up for diabetes and hypertension.  Her diabetes is uncontrolled, she reports blood sugars have been in the 300s prior to breakfast for the past 3 weeks.  She reports compliance with glipizide and metformin.  Her 24 hour diet recall includes low-sugar instant breakfast, apples raisins cashews and cheese for lunch, and ground beef with corn and mashed potatos for dinner.  She drink lightly sweetened tea throughout the day and 2 12oz cans of Dr. Pepper daily.  Discussed adding injectable to regimen and she reports had been on weekly injectable when she was living in [place] and her blood sugars were controlled in the 100s.  Agreeable to restart Trulicity, denies history of pancreatitis or medullary thyroid cancer.  She reports improvement in lower back pain since ablation procedure with Dr Knowles, pain and neuropathy to R leg remains unchanged.  Suggested initiation of Lipitor at past visit, reports did not know so she did not  prescription.  She requests pneumococcal vaccine today and replacement FIT-Test.  Does not wish to have mammogram or Gyn visit scheduled at this time, wishes to get blood sugars under control first.

## 2018-10-12 NOTE — PROGRESS NOTES
Subjective:       Patient ID: Mich Knight is a 58 y.o. female.    Chief Complaint: Follow-up (DM/ HTN) and Leg Pain (right )    Ms Knight is a 59yo female patient of Dr Márquez here today for follow up for diabetes and hypertension.  Her diabetes is uncontrolled, she reports blood sugars have been in the 300s prior to breakfast for the past 3 weeks.  She reports compliance with glipizide and metformin.  Her 24 hour diet recall includes low-sugar instant breakfast, apples raisins cashews and cheese for lunch, and ground beef with corn and mashed potatos for dinner.  She drink lightly sweetened tea throughout the day and 2 12oz cans of Dr. Pepper daily.  Discussed adding injectable to regimen and she reports had been on weekly injectable when she was living in [place] and her blood sugars were controlled in the 100s.  Agreeable to restart Trulicity, denies history of pancreatitis or medullary thyroid cancer.  She reports improvement in lower back pain since ablation procedure with Dr Knowles, pain and neuropathy to R leg remains unchanged.  Suggested initiation of Lipitor at past visit, reports did not know so she did not  prescription.  She requests pneumococcal vaccine today and replacement FIT-Test.  Does not wish to have mammogram or Gyn visit scheduled at this time, wishes to get blood sugar under control first.       Review of Systems   Constitutional: Positive for activity change. Negative for unexpected weight change.   HENT: Negative for hearing loss, rhinorrhea and trouble swallowing.    Eyes: Negative for discharge and visual disturbance.   Respiratory: Negative for wheezing.    Cardiovascular: Negative for chest pain and palpitations.   Gastrointestinal: Negative for constipation, diarrhea and vomiting.   Endocrine: Negative for polydipsia, polyphagia and polyuria.   Genitourinary: Negative for difficulty urinating, dysuria, hematuria and menstrual problem.   Musculoskeletal: Positive for arthralgias.  Negative for joint swelling and neck pain.   Neurological: Positive for weakness. Negative for headaches.   Psychiatric/Behavioral: Positive for dysphoric mood. Negative for confusion.       Objective:      Physical Exam   Constitutional: She is oriented to person, place, and time. She appears well-nourished. No distress.   HENT:   Head: Normocephalic and atraumatic.   Right Ear: External ear normal.   Left Ear: External ear normal.   Mouth/Throat: Oropharynx is clear and moist. No oropharyngeal exudate.   Eyes: Pupils are equal, round, and reactive to light. Right eye exhibits no discharge. Left eye exhibits no discharge.   Neck: Neck supple. No thyromegaly present.   Cardiovascular: Normal rate and regular rhythm. Exam reveals no gallop and no friction rub.   No murmur heard.  Pulmonary/Chest: Effort normal and breath sounds normal. No respiratory distress. She has no wheezes. She has no rales.   Abdominal: Soft. She exhibits no distension. There is no tenderness.   Lymphadenopathy:     She has no cervical adenopathy.   Neurological: She is alert and oriented to person, place, and time. Coordination normal.   Skin: Skin is warm and dry.   Psychiatric: She has a normal mood and affect. Her behavior is normal. Thought content normal.   Vitals reviewed.          Current Outpatient Medications:     aspirin (ECOTRIN) 81 MG EC tablet, Take 81 mg by mouth once daily., Disp: , Rfl:     atorvastatin (LIPITOR) 20 MG tablet, Take 1 tablet (20 mg total) by mouth once daily., Disp: 90 tablet, Rfl: 3    gabapentin (NEURONTIN) 300 MG capsule, Take 3 capsules (900 mg total) by mouth 3 (three) times daily., Disp: 270 capsule, Rfl: 2    glipiZIDE (GLUCOTROL) 5 MG TR24, Take 1 tablet (5 mg total) by mouth daily with breakfast., Disp: 90 tablet, Rfl: 1    lisinopril-hydrochlorothiazide (PRINZIDE,ZESTORETIC) 20-12.5 mg per tablet, Take 1 tablet by mouth once daily., Disp: 90 tablet, Rfl: 1    metFORMIN (GLUCOPHAGE) 1000 MG  "tablet, Take 1 tablet (1,000 mg total) by mouth 2 (two) times daily with meals., Disp: 180 tablet, Rfl: 1    traMADol (ULTRAM) 50 mg tablet, Take 1 tablet (50 mg total) by mouth every 12 (twelve) hours as needed for Pain., Disp: 60 tablet, Rfl: 1    dulaglutide (TRULICITY) 0.75 mg/0.5 mL PnIj, Inject 0.5 mLs (0.75 mg total) into the skin every 7 days., Disp: 1.5 mL, Rfl: 5    mupirocin (BACTROBAN) 2 % ointment, Apply topically 3 (three) times daily., Disp: 30 g, Rfl: 0    pen needle, diabetic (PEN NEEDLE) 31 gauge x 5/16" Ndle, 1 each by Misc.(Non-Drug; Combo Route) route once a week., Disp: 30 each, Rfl: 5  Assessment:       1. Uncontrolled type 2 diabetes mellitus with hyperglycemia    2. Hypertension associated with diabetes    3. Hyperlipidemia, unspecified hyperlipidemia type    4. Tobacco abuse        Plan:       Uncontrolled type 2 diabetes mellitus with hyperglycemia  Dietary changes stressed.  Stop Dr. Pepper.  Drink water with lemon.  Exercise recommended.  -     Comprehensive metabolic panel; Future; Expected date: 10/12/2018  -     Hemoglobin A1c; Future; Expected date: 10/12/2018  -     (In Office Administered) Pneumococcal Polysaccharide Vaccine (23 Valent) (SQ/IM)  -     dulaglutide (TRULICITY) 0.75 mg/0.5 mL PnIj; Inject 0.5 mLs (0.75 mg total) into the skin every 7 days.  Dispense: 1.5 mL; Refill: 5  -     pen needle, diabetic (PEN NEEDLE) 31 gauge x 5/16" Ndle; 1 each by Misc.(Non-Drug; Combo Route) route once a week.  Dispense: 30 each; Refill: 5  -     metFORMIN (GLUCOPHAGE) 1000 MG tablet; Take 1 tablet (1,000 mg total) by mouth 2 (two) times daily with meals.  Dispense: 180 tablet; Refill: 1  -     glipiZIDE (GLUCOTROL) 5 MG TR24; Take 1 tablet (5 mg total) by mouth daily with breakfast.  Dispense: 90 tablet; Refill: 1  -     Ambulatory consult to Diabetic Education    Hypertension associated with diabetes  Stable on current medication.    Hyperlipidemia, unspecified hyperlipidemia " type  Start atorvastatin.    Tobacco abuse  Encouraged to quit.  Patient not ready.  Advised increases risk of heart disease.    Patient readiness: nonacceptance and barriers:readiness    During the course of the visit the patient was educated and counseled about the following:     Diabetes:  Discussed general issues about diabetes pathophysiology and management.  Addressed ADA diet.  Encouraged aerobic exercise.  Diabetes educator referral.  Hypertension:   Medication: no change.  Regular aerobic exercise.    Goals: Diabetes: Maintain Hemoglobin A1C below 7 and Hypertension: Reduce Blood Pressure    Did patient meet goals/outcomes: No    The following self management tools provided: declined    Patient Instructions (the written plan) was given to the patient/family.     Time spent with patient: 30 minutes    Barriers to medications present (yes )    Adverse reactions to current medications (no)    Over the counter medications reviewed (No)      RTC 3 mos with me, 6 mos with PCP.

## 2018-10-16 ENCOUNTER — HOSPITAL ENCOUNTER (OUTPATIENT)
Dept: RADIOLOGY | Facility: HOSPITAL | Age: 58
Discharge: HOME | End: 2018-10-16
Attending: INTERNAL MEDICINE
Payer: MEDICARE

## 2018-10-16 DIAGNOSIS — Z12.31 BREAST CANCER SCREENING BY MAMMOGRAM: ICD-10-CM

## 2018-10-16 PROCEDURE — 77063 BREAST TOMOSYNTHESIS BI: CPT | Mod: 26,,, | Performed by: RADIOLOGY

## 2018-10-16 PROCEDURE — 77067 SCR MAMMO BI INCL CAD: CPT | Mod: 26,,, | Performed by: RADIOLOGY

## 2018-10-16 PROCEDURE — 77063 BREAST TOMOSYNTHESIS BI: CPT | Mod: TC

## 2018-10-16 PROCEDURE — 77067 SCR MAMMO BI INCL CAD: CPT | Mod: TC

## 2018-10-17 ENCOUNTER — TELEPHONE (OUTPATIENT)
Dept: ADMINISTRATIVE | Facility: HOSPITAL | Age: 58
End: 2018-10-17

## 2018-10-18 ENCOUNTER — OFFICE VISIT (OUTPATIENT)
Dept: PAIN MEDICINE | Facility: CLINIC | Age: 58
End: 2018-10-18
Payer: MEDICARE

## 2018-10-18 VITALS
DIASTOLIC BLOOD PRESSURE: 76 MMHG | WEIGHT: 145 LBS | HEART RATE: 99 BPM | HEIGHT: 63 IN | SYSTOLIC BLOOD PRESSURE: 116 MMHG | BODY MASS INDEX: 25.69 KG/M2

## 2018-10-18 DIAGNOSIS — M79.18 MYOFASCIAL PAIN: ICD-10-CM

## 2018-10-18 DIAGNOSIS — M47.819 FACET ARTHROPATHY: ICD-10-CM

## 2018-10-18 DIAGNOSIS — M51.36 DDD (DEGENERATIVE DISC DISEASE), LUMBAR: ICD-10-CM

## 2018-10-18 DIAGNOSIS — M54.16 LUMBAR RADICULOPATHY: Primary | ICD-10-CM

## 2018-10-18 PROCEDURE — 99214 OFFICE O/P EST MOD 30 MIN: CPT | Mod: PBBFAC,PN | Performed by: PHYSICIAN ASSISTANT

## 2018-10-18 PROCEDURE — 99999 PR PBB SHADOW E&M-EST. PATIENT-LVL IV: CPT | Mod: PBBFAC,,, | Performed by: PHYSICIAN ASSISTANT

## 2018-10-18 PROCEDURE — 99214 OFFICE O/P EST MOD 30 MIN: CPT | Mod: S$PBB,,, | Performed by: PHYSICIAN ASSISTANT

## 2018-10-18 RX ORDER — PREGABALIN 150 MG/1
150 CAPSULE ORAL 2 TIMES DAILY
Qty: 60 CAPSULE | Refills: 2 | Status: CANCELLED | OUTPATIENT
Start: 2018-10-18 | End: 2019-04-18

## 2018-10-18 RX ORDER — PREGABALIN 150 MG/1
150 CAPSULE ORAL 2 TIMES DAILY
Qty: 60 CAPSULE | Refills: 2 | Status: SHIPPED | OUTPATIENT
Start: 2018-10-18 | End: 2019-01-22 | Stop reason: SDUPTHER

## 2018-10-18 NOTE — PROGRESS NOTES
"Referring Physician: No ref. provider found    PCP: Jannet Márquez MD    CC:R low back and R leg pain    Interval History:  Mich Knight is a 58 y.o. female with low back pain who presents today for f/u s/p lumbar MB RFA at L3-5 bilaterally that provided 60% improvment in low back pain. She c/o bilateral lateral low and mid back pain. Reports numbness and tingling in bilateral thighs. Reports symptoms become so severe she cannot move her legs. Previous bilateral L4-5 TFESI provided only 60% relief for 1 week. Previous right sided L4-5 TFESI provided 50% relief for 4 days.  She continues to be very disappointed because in the past in [place] she had good relief for 3-4 months with each MARILIA. She is also s/p right SIJ injection that provided her with very good relief for almost 2 weeks. Pain then returned to baseline. She also c/o pain in her mid back, neck , and UEs. The pain is described as throbbing, shooting, deep, exacerbated by standing, sitting, bending, and walking. She denies any leg weakness. No bowel or bladder incontinence. She takes Gabapentin 600 mg TID without benefit or SEs. She would like to try a different medicaiton. She went to 2 PT visits but lost her transportation. She tried PT in the past and could not get out of bed for 2 months. She has concomitant diabetic neuropathy. She was evaluated by Dr. Martinez who did not recommend surgery. She states she was offered back surgery in 2009 and 2010 by 2 surgeons in [place] for "missing discs".  She rates her pain as a 10/10.     HPI:   Mich Knight is a 58 y.o. female with a history of low back pain who recently moved down from [place]. Within the last 2 years she has had progression of low back pain and was seeing a PM&R physician in [place] (Dr. Ramu Lugo in [place]). She was receiving epidurals every 4 months, which she states was helping her pain, with TPIs every month in between. The pain begins in her low back, radiates primarily to her R " buttock and down the leg to above the knee. She rarely has pain that goes past her R knee. She has concomitant diabetic neuropathy for which she recently increased her gabapentin dose. The pain as described as throbbing, shooting, deep, exacerbated by standing, sitting, bending, and walking. She denies any leg weakness. No bowel or bladder incontinence. She rates her pain as a 10 today.     ROS:  CONSTITUTIONAL: No fevers, chills, night sweats, wt. loss, appetite changes  SKIN: no rashes or itching  ENT: No headaches, head trauma, vision changes, or eye pain  LYMPH NODES: None noticed   CV: No chest pain, palpitations.   RESP: No shortness of breath, dyspnea on exertion, cough, wheezing, or hemoptysis  GI: No nausea, emesis, diarrhea, constipation, melena, hematochezia, pain.    : No dysuria, hematuria, urgency, or frequency   HEME: No easy bruising, bleeding problems  PSYCHIATRIC: No anxiety, psychosis, hallucinations. +ve depression  NEURO: No seizures, memory loss, dizziness or difficulty sleeping  MSK: +ve per HPI      Past Medical History:   Diagnosis Date    DDD (degenerative disc disease), lumbar     Diabetes mellitus     Essential tremor     HLD (hyperlipidemia)     HTN (hypertension)      Past Surgical History:   Procedure Laterality Date    BLOCK- BRANCH- SACROILIAC Right 2/9/2018    [facility]    Block-nerve-medial branch-lumbar Bilateral 7/10/2018    [facility]    CHOLECYSTECTOMY      INJECTION OF ANESTHETIC AGENT AROUND MEDIAL BRANCH NERVES INNERVATING LUMBAR FACET JOINT Bilateral 7/10/2018    Procedure: Block-nerve-medial branch-lumbar;  Surgeon: Charles Knowles MD;  Location: [facility] OR;  Service: Pain Management;  Laterality: Bilateral;  L2,3,4,5    INJECTION-STEROID-EPIDURAL-LUMBAR Right 3/27/2018    [facility] at [facility]    INJECTION-STEROID-EPIDURAL-TRANSFORAMINAL Bilateral 5/21/2018    Per[facility] by [facility] at  "[facility]    RADIOFREQUENCY ABLATION OF LUMBAR MEDIAL BRANCH NERVE AT SINGLE LEVEL Bilateral 7/27/2018    Procedure: RADIOFREQUENCY ABLATION, NERVE, MEDIAL BRANCH, LUMBAR, 1 LEVEL;  Surgeon: Charles Knowles MD;  Location: [facility] OR;  Service: Pain Management;  Laterality: Bilateral;  L2,3,4,5 - Burned at 80 degrees C. for 75 seconds x 2 each site    RADIOFREQUENCY ABLATION, NERVE, MEDIAL BRANCH, LUMBAR, 1 LEVEL Bilateral 7/27/2018    Per[facility] at [facility] OR     Family History   Problem Relation Age of Onset    Heart disease Mother     Macular degeneration Mother     Diabetes type II Father     Dementia Father     Heart disease Father      Social History     Socioeconomic History    Marital status:      Spouse name: None    Number of children: None    Years of education: None    Highest education level: None   Social Needs    Financial resource strain: None    Food insecurity - worry: None    Food insecurity - inability: None    Transportation needs - medical: None    Transportation needs - non-medical: None   Occupational History    None   Tobacco Use    Smoking status: Current Every Day Smoker     Packs/day: 0.50     Years: 40.00     Pack years: 20.00    Smokeless tobacco: Never Used   Substance and Sexual Activity    Alcohol use: Yes     Alcohol/week: 0.0 - 3.0 oz    Drug use: No    Sexual activity: Not Currently   Other Topics Concern    None   Social History Narrative    Does not work, reports being disabled due to back pain. Previously was .          Medications/Allergies: See med card    Vitals:    10/18/18 1345   BP: 116/76   Pulse: 99   Weight: 65.8 kg (145 lb)   Height: 5' 3" (1.6 m)   PainSc: 10-Worst pain ever   PainLoc: Back         Physical exam:    GENERAL: A and O x3, the patient appears well groomed and is in no acute distress.  Skin: No rashes or obvious lesions  HEENT: normocephalic, atraumatic  CARDIOVASCULAR:  RRR  LUNGS: non labored " breathing  ABDOMEN: soft, nontender   UPPER EXTREMITIES: Normal alignment, normal range of motion, no atrophy, no skin changes,  hair growth and nail growth normal and equal bilaterally. No swelling, no tenderness.    LOWER EXTREMITIES:  Normal alignment, normal range of motion, no atrophy, no skin changes,  hair growth and nail growth normal and equal bilaterally. No swelling, no tenderness.    LUMBAR SPINE  Lumbar spine: ROM is full with flexion extension and oblique extension with  increased pain.    Levi's test causes increased pain on R side with bilateral maneuvers  Supine straight leg raise is negative bilaterally.    Internal and external rotation of the hip causes no increased pain on either side.  Myofascial exam: severe tenderness with palpation along lower lumbar region, +ve tenderness along R SI joint      MENTAL STATUS: normal orientation, speech, language, and fund of knowledge for social situation.  Emotional state appropriate.    CRANIAL NERVES:  II:  PERRL bilaterally,   III,IV,VI: EOMI.    V:  Facial sensation equal bilaterally  VII:  Facial motor function normal.  VIII:  Hearing equal to finger rub bilaterally  IX/X: Gag normal, palate symmetric  XI:  Shoulder shrug equal, head turn equal  XII:  Tongue midline without fasciculations      MOTOR: Tone and bulk: normal bilateral upper and lower Strength: normal   Delt Bi Tri WE WF     R 5 5 5 5 5 5   L 5 5 5 5 5 5     IP ADD ABD Quad TA Gas HAM  R 4 4 4 4 4 4 4  L 4 4 4 4 4 4 4    SENSATION: Light touch and pinprick intact bilaterally  REFLEXES: normal, symmetric, nonbrisk.  Toes down, no clonus. No hoffmans.  GAIT: normal rise, base, steps, and arm swing.        Imaging:  MRI L-spine 9/2016        L4-5, moderate disc desiccation.  Concentric disc bulge with small central protrusion.  Left greater than right subarticular protrusion.  Moderate bilateral facet arthropathy.  Mild central canal stenosis.  Moderate advanced left foraminal  stenosis.  L5-S1 advanced disc desiccation loss of disc height.  Broad-based disc protrusion to the right.  Moderate bilateral facet arthropathy.  There is advanced right foraminal stenosis.  A minimal left foraminal stenosis.    Assessment:  58 yo female with   1. Lumbar radiculopathy    2. DDD (degenerative disc disease), lumbar    3. Facet arthropathy    4. Myofascial pain        Plan:  1. I have stressed the importance of physical activity and exercise to improve overall health  2. Monitor progress of lumbar MB RFA  3. Recommend restarting PT to focus on low back ASAP. Will be beneifical to prevent further muscle atrophy and increase mobility and strength   4. Titrate Gabapentin down. Start Lyrica 150 mg BID.   5. EMG of LE to evaluate LE numbness  6. F/u 3 months   All medication management was performed by Dr. Charles Knowles

## 2018-10-29 RX ORDER — TRAMADOL HYDROCHLORIDE 50 MG/1
50 TABLET ORAL EVERY 12 HOURS PRN
Qty: 60 TABLET | Refills: 1 | Status: SHIPPED | OUTPATIENT
Start: 2018-10-29 | End: 2019-01-02 | Stop reason: SDUPTHER

## 2018-11-09 ENCOUNTER — OFFICE VISIT (OUTPATIENT)
Dept: PHYSICAL MEDICINE AND REHAB | Facility: CLINIC | Age: 58
End: 2018-11-09
Payer: MEDICARE

## 2018-11-09 ENCOUNTER — TELEPHONE (OUTPATIENT)
Dept: PAIN MEDICINE | Facility: CLINIC | Age: 58
End: 2018-11-09

## 2018-11-09 DIAGNOSIS — M54.16 LUMBAR RADICULOPATHY: ICD-10-CM

## 2018-11-09 PROCEDURE — 95910 NRV CNDJ TEST 7-8 STUDIES: CPT | Mod: 26,S$PBB,, | Performed by: PHYSICAL MEDICINE & REHABILITATION

## 2018-11-09 PROCEDURE — 95910 NRV CNDJ TEST 7-8 STUDIES: CPT | Mod: PBBFAC,PN | Performed by: PHYSICAL MEDICINE & REHABILITATION

## 2018-11-09 PROCEDURE — 99499 UNLISTED E&M SERVICE: CPT | Mod: S$PBB,,, | Performed by: PHYSICAL MEDICINE & REHABILITATION

## 2018-11-09 PROCEDURE — 95886 MUSC TEST DONE W/N TEST COMP: CPT | Mod: PBBFAC,PN | Performed by: PHYSICAL MEDICINE & REHABILITATION

## 2018-11-09 PROCEDURE — 95886 MUSC TEST DONE W/N TEST COMP: CPT | Mod: 26,S$PBB,, | Performed by: PHYSICAL MEDICINE & REHABILITATION

## 2018-11-09 NOTE — TELEPHONE ENCOUNTER
EMG results:   Mild sensorimotor  neuropathy  NO electrophysiologic evidence of a lumbar radiculopathy

## 2018-11-09 NOTE — PROGRESS NOTES
[facility]  Joanna Sanz D.O.  Physical Medicine and Rehab  Phone: [phone]  Fax: [phone]            Patient:      Patient ID:    Sex:    Date of Birth:    Age:    Notes:    Last visit date:              Visit date and time:    Patient Age on Visit Date:    Referring Physician:    Diagnoses:       Leg pain. R>L       Sensory NCS      Nerve / Sites Rec. Site Onset Lat Peak Lat Ref. NP Amp Ref. PP Amp Ref. Segments Distance Velocity     ms ms ms µV µV µV µV  cm m/s   R Sural - Ankle (Calf)      Calf Ankle 3.80 4.69 ?4.20 8.2 ?5.0 5.5 ?5.0 Calf - Ankle 14 37   R Superficial peroneal - Ankle      Lat leg Ankle NR NR ?4.40 NR ?5.0 NR ?5.0 Lat leg - Ankle 14 NR   L Superficial peroneal - Ankle      Lat leg Ankle NR NR ?4.40 NR ?5.0 NR ?5.0 Lat leg - Ankle 14 NR           Motor NCS      Nerve / Sites Muscle Latency Ref. Amplitude Ref. Amp % Duration Segments Distance Lat Diff Velocity Ref.     ms ms mV mV % ms  cm ms m/s m/s   R Peroneal - EDB      Ankle EDB 4.79 ?6.20 3.9 ?2.0 100 8.39 Ankle - EDB 8         Fib head EDB 11.46  3.3  83.6  Fib head - Ankle 28 6.67 42 ?39      Pop fossa EDB 13.65  2.7  69.1  Pop fossa - Fib head 10 2.19 46 ?39   L Peroneal - EDB      Ankle EDB 4.27 ?6.20 2.6 ?2.0 100 8.91 Ankle - EDB 8         Fib head EDB 12.19  1.2  45.7  Fib head - Ankle 32 7.92 40 ?39      Pop fossa EDB 13.23  3.1  121 5.68 Pop fossa - Fib head 10 1.04 96 ?39   R Tibial - AH      Ankle AH 4.06 ?6.00 7.3 ?3.0 100 5.52 Ankle - AH 8         Pop fossa AH 12.86  4.7  64.5 6.20 Pop fossa - Ankle 35 8.80 40 ?39   L Tibial - AH      Ankle AH 3.59 ?6.00 8.4 ?3.0 100 6.25 Ankle - AH 8         Pop fossa AH 13.18  2.5  30 5.78 Pop fossa - Ankle 29 9.58 30 ?39           F  Wave      Nerve Fmin Ref.    ms ms   R Tibial - AH 56.98 ?58.00   L Tibial - AH 52.40 ?58.00           EMG          EMG Summary Table     Spontaneous MUAP Recruitment   Muscle IA Fib PSW Fasc H.F. Amp Dur. PPP Pattern   L. Vastus lateralis  N None None None None N N N N   R. Vastus lateralis N None None None None N N N N   L. Rectus femoris N None None None None N N N N   R. Rectus femoris N None None None None N N N N   L. Gastrocnemius (Medial head) N None None None None N N N N   R. Gastrocnemius (Medial head) N None None None None N N N N   L. Tibialis anterior N None None None None N N N N   R. Tibialis anterior N None None None None N N N N   L. Biceps femoris (short head) N None None None None N N N N   R. Biceps femoris (short head) N None None None None N N N N   L. Lumbar paraspinals (mid) N None None None None N N N N   R. Lumbar paraspinals (mid) N None None None None N N N N   L. Lumbar paraspinals (low) N None None None None N N N N   R. Lumbar paraspinals (low) N None None None None N N N N           Summary    The motor conduction test was performed on 4 nerve(s). The results were normal in 3 nerve(s): R Peroneal - EDB, L Peroneal - EDB, R Tibial - AH. Results outside the specified normal range were found in 1 nerve(s), as follows:   In the L Tibial - AH study  o the take off velocity result was reduced for Pop fossa - Ankle segment    The sensory conduction test was performed on 4 nerve(s). There were no results within the specified normal range. Findings were unremarkable in 1 nerve(s): L Sural - Ankle (Calf). Results outside the specified normal range were found in 3 nerve(s), as follows:   In the R Sural - Ankle (Calf) study  o the peak latency result was increased for Calf stimulation   In the R Superficial peroneal - Ankle study  o the response was considered absent for Lat leg stimulation   In the L Superficial peroneal - Ankle study  o the response was considered absent for Lat leg stimulation    The F wave study was normal in all 2 of the tested nerves: R Tibial - AH, L Tibial - AH.    The needle EMG study was normal in all 14 tested muscles: L. Vastus lateralis, R. Vastus lateralis, L. Rectus femoris, R. Rectus femoris, L.  Gastrocnemius (Medial head), R. Gastrocnemius (Medial head), L. Tibialis anterior, R. Tibialis anterior, L. Biceps femoris (short head), R. Biceps femoris (short head), L. Lumbar paraspinals (mid), R. Lumbar paraspinals (mid), L. Lumbar paraspinals (low), R. Lumbar paraspinals (low).              Conclusion:     Mild sensorimotor predominantly axonal neuropathy  NO electrophysiologic evidence of a lumbar radiculopathy        ____________________________  Joanna Sanz D.O.

## 2018-11-09 NOTE — LETTER
November 9, 2018      Carmen Rowell PA-C  [address] [place] [address] [address]  [address]  [place] [id number]           [facility]  [address] [place] [address] [address]  [place] [address]  Phone: [phone]  Fax: [phone]          Patient: Mich Knight   MR Number: [id number]   YOB: 1960   Date of Visit: 11/9/2018       Dear Carmen Rowell:    Thank you for referring Mich Knight to me for evaluation. Attached you will find relevant portions of my assessment and plan of care.    If you have questions, please do not hesitate to call me. I look forward to following Mich Knight along with you.    Sincerely,    Joanna Sanz,     Enclosure  CC:  No Recipients    If you would like to receive this communication electronically, please contact [email or web address] or [phone] to request more information on [employer] access.    For providers and/or their staff who would like to refer a patient to [facility], please contact us through our one-stop-shop provider referral line, [facility] , at [phone].    If you feel you have received this communication in error or would no longer like to receive these types of communications, please e-mail [email or web address]

## 2018-11-21 ENCOUNTER — TELEPHONE (OUTPATIENT)
Dept: PAIN MEDICINE | Facility: CLINIC | Age: 58
End: 2018-11-21

## 2018-11-21 NOTE — TELEPHONE ENCOUNTER
Spoke to pt. Informed her she would need to go to the medical records department in the hospital and request the disc. Pt verbalized understanding and agreement.

## 2018-11-21 NOTE — TELEPHONE ENCOUNTER
----- Message from Monroe Rea sent at 11/21/2018 10:26 AM CST -----  Type: Needs Medical Advice    Who Called:  Patient    Best Call Back Number: [phone]  Additional Information: Patient calling.  States that she would like a disc with her latest MRI results.  Please call to advise

## 2019-01-01 ENCOUNTER — PATIENT MESSAGE (OUTPATIENT)
Dept: PAIN MEDICINE | Facility: CLINIC | Age: 59
End: 2019-01-01

## 2019-01-02 RX ORDER — TRAMADOL HYDROCHLORIDE 50 MG/1
50 TABLET ORAL EVERY 12 HOURS PRN
Qty: 60 TABLET | Refills: 1 | Status: SHIPPED | OUTPATIENT
Start: 2019-01-02 | End: 2019-01-22 | Stop reason: SDUPTHER

## 2019-01-02 RX ORDER — TRAMADOL HYDROCHLORIDE 50 MG/1
TABLET ORAL
Qty: 60 TABLET | Refills: 1 | OUTPATIENT
Start: 2019-01-02

## 2019-01-15 ENCOUNTER — PATIENT MESSAGE (OUTPATIENT)
Dept: PAIN MEDICINE | Facility: CLINIC | Age: 59
End: 2019-01-15

## 2019-01-22 ENCOUNTER — OFFICE VISIT (OUTPATIENT)
Dept: PAIN MEDICINE | Facility: CLINIC | Age: 59
End: 2019-01-22
Payer: MEDICARE

## 2019-01-22 VITALS
WEIGHT: 145 LBS | SYSTOLIC BLOOD PRESSURE: 113 MMHG | BODY MASS INDEX: 25.69 KG/M2 | HEIGHT: 63 IN | HEART RATE: 114 BPM | DIASTOLIC BLOOD PRESSURE: 80 MMHG

## 2019-01-22 DIAGNOSIS — M79.18 MYOFASCIAL PAIN: ICD-10-CM

## 2019-01-22 DIAGNOSIS — M51.36 DDD (DEGENERATIVE DISC DISEASE), LUMBAR: ICD-10-CM

## 2019-01-22 DIAGNOSIS — Z79.891 CHRONIC USE OF OPIATE FOR THERAPEUTIC PURPOSE: ICD-10-CM

## 2019-01-22 DIAGNOSIS — M47.896 OTHER SPONDYLOSIS, LUMBAR REGION: Primary | ICD-10-CM

## 2019-01-22 PROCEDURE — 99499 UNLISTED E&M SERVICE: CPT | Mod: S$PBB,,, | Performed by: ANESTHESIOLOGY

## 2019-01-22 PROCEDURE — 99499 RISK ADDL DX/OHS AUDIT: ICD-10-PCS | Mod: S$PBB,,, | Performed by: ANESTHESIOLOGY

## 2019-01-22 PROCEDURE — 99214 PR OFFICE/OUTPT VISIT, EST, LEVL IV, 30-39 MIN: ICD-10-PCS | Mod: S$PBB,,, | Performed by: ANESTHESIOLOGY

## 2019-01-22 PROCEDURE — 99213 OFFICE O/P EST LOW 20 MIN: CPT | Mod: PBBFAC,PN | Performed by: ANESTHESIOLOGY

## 2019-01-22 PROCEDURE — 99999 PR PBB SHADOW E&M-EST. PATIENT-LVL III: ICD-10-PCS | Mod: PBBFAC,,, | Performed by: ANESTHESIOLOGY

## 2019-01-22 PROCEDURE — 99214 OFFICE O/P EST MOD 30 MIN: CPT | Mod: S$PBB,,, | Performed by: ANESTHESIOLOGY

## 2019-01-22 PROCEDURE — 80307 DRUG TEST PRSMV CHEM ANLYZR: CPT

## 2019-01-22 PROCEDURE — 99999 PR PBB SHADOW E&M-EST. PATIENT-LVL III: CPT | Mod: PBBFAC,,, | Performed by: ANESTHESIOLOGY

## 2019-01-22 RX ORDER — PREGABALIN 150 MG/1
150 CAPSULE ORAL 2 TIMES DAILY
Qty: 60 CAPSULE | Refills: 2 | Status: SHIPPED | OUTPATIENT
Start: 2019-01-22 | End: 2019-03-19

## 2019-01-22 RX ORDER — TRAMADOL HYDROCHLORIDE 50 MG/1
50 TABLET ORAL EVERY 12 HOURS PRN
Qty: 60 TABLET | Refills: 2 | Status: SHIPPED | OUTPATIENT
Start: 2019-01-22 | End: 2019-06-17

## 2019-01-22 NOTE — PROGRESS NOTES
Referring Physician: No ref. provider found    PCP: Primary Doctor No    CC:R low back and R leg pain    Interval History:  Mich Knight is a 58 y.o. female with low back pain who returns to our clinic.  She underwent lumbar MB RFA procedure in July 2018 with moderate benefit her lower back pain.  Pain has since gradually recurred.  She desires repeat procedure.   Pain is described as throbbing, shooting, deep, exacerbated by standing, sitting, bending, and walking. She denies any leg weakness. No bowel or bladder incontinence.  She currently takes Lyrica and tramadol with mild-to-moderate benefit.  She has tried physical therapy with mild benefits.  EMG study recently performed showed peripheral neuropathy.  She denies any worsening weakness.  No bowel bladder changes.    Prior HPI:   Mich Knight is a 58 y.o. female with a history of low back pain who recently moved down from [place]. Within the last 2 years she has had progression of low back pain and was seeing a PM&R physician in [place] (Dr. Ramu Lugo in [place]). She was receiving epidurals every 4 months, which she states was helping her pain, with TPIs every month in between. The pain begins in her low back, radiates primarily to her R buttock and down the leg to above the knee. She rarely has pain that goes past her R knee. She has concomitant diabetic neuropathy for which she recently increased her gabapentin dose. The pain as described as throbbing, shooting, deep, exacerbated by standing, sitting, bending, and walking. She denies any leg weakness. No bowel or bladder incontinence. Previous bilateral L4-5 TFESI provided only 60% relief for 1 week. Previous right sided L4-5 TFESI provided 50% relief for 4 days.  She continues to be very disappointed because in the past in [place] she had good relief for 3-4 months with each MARILIA. She is also s/p right SIJ injection that provided her with very good relief for almost 2 weeks. She was evaluated by   "Michelle who did not recommend surgery. She states she was offered back surgery in 2009 and 2010 by 2 surgeons in [place] for "missing discs".      ROS:  CONSTITUTIONAL: No fevers, chills, night sweats, wt. loss, appetite changes  SKIN: no rashes or itching  ENT: No headaches, head trauma, vision changes, or eye pain  LYMPH NODES: None noticed   CV: No chest pain, palpitations.   RESP: No shortness of breath, dyspnea on exertion, cough, wheezing, or hemoptysis  GI: No nausea, emesis, diarrhea, constipation, melena, hematochezia, pain.    : No dysuria, hematuria, urgency, or frequency   HEME: No easy bruising, bleeding problems  PSYCHIATRIC: No anxiety, psychosis, hallucinations. +ve depression  NEURO: No seizures, memory loss, dizziness or difficulty sleeping  MSK: +ve per HPI      Past Medical History:   Diagnosis Date    DDD (degenerative disc disease), lumbar     Diabetes mellitus     Essential tremor     HLD (hyperlipidemia)     HTN (hypertension)      Past Surgical History:   Procedure Laterality Date    BLOCK- BRANCH- SACROILIAC Right 2/9/2018    [facility]    Block-nerve-medial branch-lumbar Bilateral 7/10/2018    [facility]    CHOLECYSTECTOMY      INJECTION-STEROID-EPIDURAL-LUMBAR Right 3/27/2018    [facility]    INJECTION-STEROID-EPIDURAL-TRANSFORAMINAL Bilateral 5/21/2018    [facility]    RADIOFREQUENCY ABLATION, NERVE, MEDIAL BRANCH, LUMBAR, 1 LEVEL Bilateral 7/27/2018    [facility] at [facility]     Family History   Problem Relation Age of Onset    Heart disease Mother     Macular degeneration Mother     Diabetes type II Father     Dementia Father     Heart disease Father      Social History     Socioeconomic History    Marital status:      Spouse name: None    Number of children: None    Years of education: None    Highest education level: None " "  Social Needs    Financial resource strain: None    Food insecurity - worry: None    Food insecurity - inability: None    Transportation needs - medical: None    Transportation needs - non-medical: None   Occupational History    None   Tobacco Use    Smoking status: Current Every Day Smoker     Packs/day: 0.50     Years: 40.00     Pack years: 20.00    Smokeless tobacco: Never Used   Substance and Sexual Activity    Alcohol use: Yes     Alcohol/week: 0.0 - 3.0 oz    Drug use: No    Sexual activity: Not Currently   Other Topics Concern    None   Social History Narrative    Does not work, reports being disabled due to back pain. Previously was .          Medications/Allergies: See med card    Vitals:    01/22/19 1107   BP: 113/80   Pulse: (!) 114   Weight: 65.8 kg (145 lb)   Height: 5' 3" (1.6 m)   PainSc: 10-Worst pain ever   PainLoc: Back         Physical exam:    GENERAL: A and O x3, the patient appears well groomed and is in no acute distress.  Skin: No rashes or obvious lesions  HEENT: normocephalic, atraumatic  CARDIOVASCULAR:  RRR  LUNGS: non labored breathing  ABDOMEN: soft, nontender   UPPER EXTREMITIES: Normal alignment, normal range of motion, no atrophy, no skin changes,  hair growth and nail growth normal and equal bilaterally. No swelling, no tenderness.    LOWER EXTREMITIES:  Normal alignment, normal range of motion, no atrophy, no skin changes,  hair growth and nail growth normal and equal bilaterally. No swelling, no tenderness.    LUMBAR SPINE  Lumbar spine: ROM is full with flexion extension and oblique extension with  increased pain.    Levi's test causes increased pain on R side with bilateral maneuvers  Supine straight leg raise is negative bilaterally.    Internal and external rotation of the hip causes no increased pain on either side.  Myofascial exam: severe tenderness with palpation along lower lumbar region, +ve tenderness along R SI joint      MENTAL STATUS: " normal orientation, speech, language, and fund of knowledge for social situation.  Emotional state appropriate.    CRANIAL NERVES:  II:  PERRL bilaterally,   III,IV,VI: EOMI.    V:  Facial sensation equal bilaterally  VII:  Facial motor function normal.  VIII:  Hearing equal to finger rub bilaterally  IX/X: Gag normal, palate symmetric  XI:  Shoulder shrug equal, head turn equal  XII:  Tongue midline without fasciculations      MOTOR: Tone and bulk: normal bilateral upper and lower Strength: normal   Delt Bi Tri WE WF     R 5 5 5 5 5 5   L 5 5 5 5 5 5     IP ADD ABD Quad TA Gas HAM  R 4 4 4 4 4 4 4  L 4 4 4 4 4 4 4    SENSATION: Light touch and pinprick intact bilaterally  REFLEXES: normal, symmetric, nonbrisk.  Toes down, no clonus. No hoffmans.  GAIT: normal rise, base, steps, and arm swing.        Imaging:  MRI L-spine 9/2016        L4-5, moderate disc desiccation.  Concentric disc bulge with small central protrusion.  Left greater than right subarticular protrusion.  Moderate bilateral facet arthropathy.  Mild central canal stenosis.  Moderate advanced left foraminal stenosis.  L5-S1 advanced disc desiccation loss of disc height.  Broad-based disc protrusion to the right.  Moderate bilateral facet arthropathy.  There is advanced right foraminal stenosis.  A minimal left foraminal stenosis.    Assessment:  56 yo female with   1. Other spondylosis, lumbar region    2. Myofascial pain    3. DDD (degenerative disc disease), lumbar        Plan:  1. I have stressed the importance of physical activity and exercise to improve overall health  2.  Schedule repeat lumbar MB RFA to help her axial lower back pain.  3. Recommend continued home exercises. Will be beneifical to prevent further muscle atrophy and increase mobility and strength   4.  Continue Lyrica and tramadol as prescribed.  UDS today.  5.  Follow-up in 3 months

## 2019-01-22 NOTE — H&P (VIEW-ONLY)
Referring Physician: No ref. provider found    PCP: Primary Doctor No    CC:R low back and R leg pain    Interval History:  Mich Knight is a 58 y.o. female with low back pain who returns to our clinic.  She underwent lumbar MB RFA procedure in July 2018 with moderate benefit her lower back pain.  Pain has since gradually recurred.  She desires repeat procedure.   Pain is described as throbbing, shooting, deep, exacerbated by standing, sitting, bending, and walking. She denies any leg weakness. No bowel or bladder incontinence.  She currently takes Lyrica and tramadol with mild-to-moderate benefit.  She has tried physical therapy with mild benefits.  EMG study recently performed showed peripheral neuropathy.  She denies any worsening weakness.  No bowel bladder changes.    Prior HPI:   Mich Knight is a 58 y.o. female with a history of low back pain who recently moved down from [place]. Within the last 2 years she has had progression of low back pain and was seeing a PM&R physician in [place] (Dr. Ramu Lugo in [place]). She was receiving epidurals every 4 months, which she states was helping her pain, with TPIs every month in between. The pain begins in her low back, radiates primarily to her R buttock and down the leg to above the knee. She rarely has pain that goes past her R knee. She has concomitant diabetic neuropathy for which she recently increased her gabapentin dose. The pain as described as throbbing, shooting, deep, exacerbated by standing, sitting, bending, and walking. She denies any leg weakness. No bowel or bladder incontinence. Previous bilateral L4-5 TFESI provided only 60% relief for 1 week. Previous right sided L4-5 TFESI provided 50% relief for 4 days.  She continues to be very disappointed because in the past in [place] she had good relief for 3-4 months with each MARILIA. She is also s/p right SIJ injection that provided her with very good relief for almost 2 weeks. She was evaluated by   "Michelle who did not recommend surgery. She states she was offered back surgery in 2009 and 2010 by 2 surgeons in [place] for "missing discs".      ROS:  CONSTITUTIONAL: No fevers, chills, night sweats, wt. loss, appetite changes  SKIN: no rashes or itching  ENT: No headaches, head trauma, vision changes, or eye pain  LYMPH NODES: None noticed   CV: No chest pain, palpitations.   RESP: No shortness of breath, dyspnea on exertion, cough, wheezing, or hemoptysis  GI: No nausea, emesis, diarrhea, constipation, melena, hematochezia, pain.    : No dysuria, hematuria, urgency, or frequency   HEME: No easy bruising, bleeding problems  PSYCHIATRIC: No anxiety, psychosis, hallucinations. +ve depression  NEURO: No seizures, memory loss, dizziness or difficulty sleeping  MSK: +ve per HPI      Past Medical History:   Diagnosis Date    DDD (degenerative disc disease), lumbar     Diabetes mellitus     Essential tremor     HLD (hyperlipidemia)     HTN (hypertension)      Past Surgical History:   Procedure Laterality Date    BLOCK- BRANCH- SACROILIAC Right 2/9/2018    [facility]    Block-nerve-medial branch-lumbar Bilateral 7/10/2018    [facility]    CHOLECYSTECTOMY      INJECTION-STEROID-EPIDURAL-LUMBAR Right 3/27/2018    [facility]    INJECTION-STEROID-EPIDURAL-TRANSFORAMINAL Bilateral 5/21/2018    [facility]    RADIOFREQUENCY ABLATION, NERVE, MEDIAL BRANCH, LUMBAR, 1 LEVEL Bilateral 7/27/2018    [facility]     Family History   Problem Relation Age of Onset    Heart disease Mother     Macular degeneration Mother     Diabetes type II Father     Dementia Father     Heart disease Father      Social History     Socioeconomic History    Marital status:      Spouse name: None    Number of children: None    Years of education: None    Highest education level: None " "  Social Needs    Financial resource strain: None    Food insecurity - worry: None    Food insecurity - inability: None    Transportation needs - medical: None    Transportation needs - non-medical: None   Occupational History    None   Tobacco Use    Smoking status: Current Every Day Smoker     Packs/day: 0.50     Years: 40.00     Pack years: 20.00    Smokeless tobacco: Never Used   Substance and Sexual Activity    Alcohol use: Yes     Alcohol/week: 0.0 - 3.0 oz    Drug use: No    Sexual activity: Not Currently   Other Topics Concern    None   Social History Narrative    Does not work, reports being disabled due to back pain. Previously was .          Medications/Allergies: See med card    Vitals:    01/22/19 1107   BP: 113/80   Pulse: (!) 114   Weight: 65.8 kg (145 lb)   Height: 5' 3" (1.6 m)   PainSc: 10-Worst pain ever   PainLoc: Back         Physical exam:    GENERAL: A and O x3, the patient appears well groomed and is in no acute distress.  Skin: No rashes or obvious lesions  HEENT: normocephalic, atraumatic  CARDIOVASCULAR:  RRR  LUNGS: non labored breathing  ABDOMEN: soft, nontender   UPPER EXTREMITIES: Normal alignment, normal range of motion, no atrophy, no skin changes,  hair growth and nail growth normal and equal bilaterally. No swelling, no tenderness.    LOWER EXTREMITIES:  Normal alignment, normal range of motion, no atrophy, no skin changes,  hair growth and nail growth normal and equal bilaterally. No swelling, no tenderness.    LUMBAR SPINE  Lumbar spine: ROM is full with flexion extension and oblique extension with  increased pain.    Levi's test causes increased pain on R side with bilateral maneuvers  Supine straight leg raise is negative bilaterally.    Internal and external rotation of the hip causes no increased pain on either side.  Myofascial exam: severe tenderness with palpation along lower lumbar region, +ve tenderness along R SI joint      MENTAL STATUS: " normal orientation, speech, language, and fund of knowledge for social situation.  Emotional state appropriate.    CRANIAL NERVES:  II:  PERRL bilaterally,   III,IV,VI: EOMI.    V:  Facial sensation equal bilaterally  VII:  Facial motor function normal.  VIII:  Hearing equal to finger rub bilaterally  IX/X: Gag normal, palate symmetric  XI:  Shoulder shrug equal, head turn equal  XII:  Tongue midline without fasciculations      MOTOR: Tone and bulk: normal bilateral upper and lower Strength: normal   Delt Bi Tri WE WF     R 5 5 5 5 5 5   L 5 5 5 5 5 5     IP ADD ABD Quad TA Gas HAM  R 4 4 4 4 4 4 4  L 4 4 4 4 4 4 4    SENSATION: Light touch and pinprick intact bilaterally  REFLEXES: normal, symmetric, nonbrisk.  Toes down, no clonus. No hoffmans.  GAIT: normal rise, base, steps, and arm swing.        Imaging:  MRI L-spine 9/2016        L4-5, moderate disc desiccation.  Concentric disc bulge with small central protrusion.  Left greater than right subarticular protrusion.  Moderate bilateral facet arthropathy.  Mild central canal stenosis.  Moderate advanced left foraminal stenosis.  L5-S1 advanced disc desiccation loss of disc height.  Broad-based disc protrusion to the right.  Moderate bilateral facet arthropathy.  There is advanced right foraminal stenosis.  A minimal left foraminal stenosis.    Assessment:  56 yo female with   1. Other spondylosis, lumbar region    2. Myofascial pain    3. DDD (degenerative disc disease), lumbar        Plan:  1. I have stressed the importance of physical activity and exercise to improve overall health  2.  Schedule repeat lumbar MB RFA to help her axial lower back pain.  3. Recommend continued home exercises. Will be beneifical to prevent further muscle atrophy and increase mobility and strength   4.  Continue Lyrica and tramadol as prescribed.  UDS today.  5.  Follow-up in 3 months

## 2019-01-26 LAB
6MAM UR QL: NOT DETECTED
7AMINOCLONAZEPAM UR QL: NOT DETECTED
A-OH ALPRAZ UR QL: NOT DETECTED
ALPRAZ UR QL: NOT DETECTED
AMPHET UR QL SCN: NOT DETECTED
ANNOTATION COMMENT IMP: NORMAL
ANNOTATION COMMENT IMP: NORMAL
BARBITURATES UR QL: NOT DETECTED
BUPRENORPHINE UR QL: NOT DETECTED
BZE UR QL: NOT DETECTED
CARBOXYTHC UR QL: NOT DETECTED
CARISOPRODOL UR QL: NOT DETECTED
CLONAZEPAM UR QL: NOT DETECTED
CODEINE UR QL: NOT DETECTED
CREAT UR-MCNC: 289.1 MG/DL (ref 20–400)
DIAZEPAM UR QL: NOT DETECTED
ETHYL GLUCURONIDE UR QL: NOT DETECTED
FENTANYL UR QL: NOT DETECTED
HYDROCODONE UR QL: NOT DETECTED
HYDROMORPHONE UR QL: NOT DETECTED
LORAZEPAM UR QL: NOT DETECTED
MDA UR QL: NOT DETECTED
MDEA UR QL: NOT DETECTED
MDMA UR QL: NOT DETECTED
ME-PHENIDATE UR QL: NOT DETECTED
MEPERIDINE UR QL: NOT DETECTED
METHADONE UR QL: NOT DETECTED
METHAMPHET UR QL: NOT DETECTED
MIDAZOLAM UR QL SCN: NOT DETECTED
MORPHINE UR QL: NOT DETECTED
NORBUPRENORPHINE UR QL CFM: NOT DETECTED
NORDIAZEPAM UR QL: NOT DETECTED
NORFENTANYL UR QL: NOT DETECTED
NORHYDROCODONE UR QL CFM: NOT DETECTED
NOROXYCODONE UR QL CFM: NOT DETECTED
NOROXYMORPHONE: NOT DETECTED
OXAZEPAM UR QL: NOT DETECTED
OXYCODONE UR QL: NOT DETECTED
OXYMORPHONE UR QL: NOT DETECTED
PATHOLOGY STUDY: NORMAL
PCP UR QL: NOT DETECTED
PHENTERMINE UR QL: NOT DETECTED
PROPOXYPH UR QL: NOT DETECTED
SERVICE CMNT-IMP: NORMAL
TAPENTADOL UR QL SCN: NOT DETECTED
TAPENTADOL-O-SULF: NOT DETECTED
TEMAZEPAM UR QL: NOT DETECTED
TRAMADOL UR QL: PRESENT
ZOLPIDEM UR QL: NOT DETECTED

## 2019-02-07 ENCOUNTER — HOSPITAL ENCOUNTER (OUTPATIENT)
Facility: AMBULARY SURGERY CENTER | Age: 59
Discharge: HOME | End: 2019-02-07
Attending: ANESTHESIOLOGY | Admitting: ANESTHESIOLOGY
Payer: MEDICARE

## 2019-02-07 DIAGNOSIS — M47.896 OTHER SPONDYLOSIS, LUMBAR REGION: Primary | ICD-10-CM

## 2019-02-07 LAB — POCT GLUCOSE: 114 MG/DL (ref 70–110)

## 2019-02-07 PROCEDURE — 99152 MOD SED SAME PHYS/QHP 5/>YRS: CPT | Mod: ,,, | Performed by: ANESTHESIOLOGY

## 2019-02-07 PROCEDURE — 64635 DESTROY LUMB/SAC FACET JNT: CPT | Mod: 50,,, | Performed by: ANESTHESIOLOGY

## 2019-02-07 PROCEDURE — 99152 PR MOD CONSCIOUS SEDATION, SAME PHYS, 5+ YRS, FIRST 15 MIN: ICD-10-PCS | Mod: ,,, | Performed by: ANESTHESIOLOGY

## 2019-02-07 PROCEDURE — 64635 PR DESTROY LUMB/SAC FACET JNT: ICD-10-PCS | Mod: 50,,, | Performed by: ANESTHESIOLOGY

## 2019-02-07 PROCEDURE — 64636 DESTROY L/S FACET JNT ADDL: CPT | Mod: RT | Performed by: ANESTHESIOLOGY

## 2019-02-07 PROCEDURE — 64636 DESTROY L/S FACET JNT ADDL: CPT | Mod: 50,,, | Performed by: ANESTHESIOLOGY

## 2019-02-07 PROCEDURE — 64635 DESTROY LUMB/SAC FACET JNT: CPT | Mod: LT | Performed by: ANESTHESIOLOGY

## 2019-02-07 PROCEDURE — 64636 PR DESTROY L/S FACET JNT ADDL: ICD-10-PCS | Mod: 50,,, | Performed by: ANESTHESIOLOGY

## 2019-02-07 RX ORDER — METHYLPREDNISOLONE ACETATE 80 MG/ML
INJECTION, SUSPENSION INTRA-ARTICULAR; INTRALESIONAL; INTRAMUSCULAR; SOFT TISSUE
Status: DISCONTINUED
Start: 2019-02-07 | End: 2019-02-07 | Stop reason: HOSPADM

## 2019-02-07 RX ORDER — LIDOCAINE HYDROCHLORIDE 20 MG/ML
INJECTION, SOLUTION EPIDURAL; INFILTRATION; INTRACAUDAL; PERINEURAL
Status: DISCONTINUED | OUTPATIENT
Start: 2019-02-07 | End: 2019-02-07 | Stop reason: HOSPADM

## 2019-02-07 RX ORDER — LIDOCAINE HYDROCHLORIDE 10 MG/ML
INJECTION, SOLUTION EPIDURAL; INFILTRATION; INTRACAUDAL; PERINEURAL
Status: DISCONTINUED | OUTPATIENT
Start: 2019-02-07 | End: 2019-02-07 | Stop reason: HOSPADM

## 2019-02-07 RX ORDER — LIDOCAINE HYDROCHLORIDE 20 MG/ML
INJECTION, SOLUTION EPIDURAL; INFILTRATION; INTRACAUDAL; PERINEURAL
Status: DISCONTINUED
Start: 2019-02-07 | End: 2019-02-07 | Stop reason: HOSPADM

## 2019-02-07 RX ORDER — MIDAZOLAM HYDROCHLORIDE 1 MG/ML
INJECTION INTRAMUSCULAR; INTRAVENOUS
Status: DISCONTINUED
Start: 2019-02-07 | End: 2019-02-07 | Stop reason: HOSPADM

## 2019-02-07 RX ORDER — FENTANYL CITRATE 50 UG/ML
INJECTION, SOLUTION INTRAMUSCULAR; INTRAVENOUS
Status: DISCONTINUED | OUTPATIENT
Start: 2019-02-07 | End: 2019-02-07 | Stop reason: HOSPADM

## 2019-02-07 RX ORDER — MIDAZOLAM HYDROCHLORIDE 2 MG/2ML
INJECTION, SOLUTION INTRAMUSCULAR; INTRAVENOUS
Status: DISCONTINUED | OUTPATIENT
Start: 2019-02-07 | End: 2019-02-07 | Stop reason: HOSPADM

## 2019-02-07 RX ORDER — SODIUM CHLORIDE, SODIUM LACTATE, POTASSIUM CHLORIDE, CALCIUM CHLORIDE 600; 310; 30; 20 MG/100ML; MG/100ML; MG/100ML; MG/100ML
INJECTION, SOLUTION INTRAVENOUS ONCE AS NEEDED
Status: COMPLETED | OUTPATIENT
Start: 2019-02-07 | End: 2019-02-07

## 2019-02-07 RX ORDER — METHYLPREDNISOLONE ACETATE 80 MG/ML
INJECTION, SUSPENSION INTRA-ARTICULAR; INTRALESIONAL; INTRAMUSCULAR; SOFT TISSUE
Status: DISCONTINUED | OUTPATIENT
Start: 2019-02-07 | End: 2019-02-07 | Stop reason: HOSPADM

## 2019-02-07 RX ORDER — FENTANYL CITRATE 50 UG/ML
INJECTION, SOLUTION INTRAMUSCULAR; INTRAVENOUS
Status: DISCONTINUED
Start: 2019-02-07 | End: 2019-02-07 | Stop reason: HOSPADM

## 2019-02-07 RX ORDER — BUPIVACAINE HYDROCHLORIDE 2.5 MG/ML
INJECTION, SOLUTION EPIDURAL; INFILTRATION; INTRACAUDAL
Status: DISCONTINUED | OUTPATIENT
Start: 2019-02-07 | End: 2019-02-07 | Stop reason: HOSPADM

## 2019-02-07 RX ADMIN — SODIUM CHLORIDE, SODIUM LACTATE, POTASSIUM CHLORIDE, CALCIUM CHLORIDE: 600; 310; 30; 20 INJECTION, SOLUTION INTRAVENOUS at 01:02

## 2019-02-07 NOTE — DISCHARGE INSTRUCTIONS
Before leaving, please make sure you have all your personal belongings such as glasses, purses, wallets, keys, cell phones, jewelry, jackets etc  Anesthesia information        RADIOFREQUENCY/Pain injection instructions:     This procedure may take  several weeks to relieve pain  You may get some pain relief from the local anesthetic initally.    No driving for 24 hrs.   Activity as tolerated- gradually increase activities.  Dont lift over 10 lbs for 24 hrs   No heat at injection sites x 2 days. No heating pads, hot tubs, saunas, or swimming in any body of water or pool for 2 days.  Use ice pack for mild swelling and for comfort , apply for 20 minutes, remove for 20 minute intervals. No direct contact of ice itself  to skin.  May shower today. No tub baths for two days.      Resume Aspirin, Plavix, or Coumadin the day after the procedure unless otherwise instructed.   If diabetic,monitor your glucose carefully as steroids can increase your glucose level    Seek immediate medical help for:   Severe increase in your usual pain or appearance of new pain.  Prolonged (more than 8 hours) or increasing weakness or numbness in the legs or arms. Numbing medicine was injected and can affect the messages to and from the brain and legs or arms.  .    Fever above 100.4F ,Drainage,redness,active bleeding, or increased swelling at the injection site.  Headache, shortness of breath, chest pain, or breathing problems.    Anesthesia Safety:  You have been given medicine  to sedate you during your procedure today. This may have included both a pain medicine and sleeping medicine. Most of the effects have worn off; however, you may continue to have some drowsiness for the next  24 hours. Anesthesia and pain medicines can cause nausea, sleepiness, dizziness and  constipation.  1) For the next EIGHT HOURS, you should be watched by a responsible adult to look for any worsening of your condition.  2) DO NOT DRINK any ALCOHOL for the next 24  HOURS.  3) DO NOT DRIVE or operate dangerous machinery during the next 24 HOURS.  FOLLOW UP with your doctor or this facility if you are not alert and back to your usual level of activity within 24 hrs.  GET PROMPT MEDICAL ATTENTION if any of the following occur:  -- Increased drowsiness  -- Increased weakness or dizziness  -- Repeated vomiting  -- If you cannot be awakened     Radiofrequency of Nerves    After this procedure you may have increased pain for three days and lingering pain for up to 6 weeks.   Most patients feel better after 4-6  weeks.        Use ice pack for discomfort :apply for 20 minutes, remove for 20 minutes for up to 2 days. Do not sleep with ice pack.  Do not use a heating pad or take tub baths or swim for 2 days.  You may shower today  Gradually increase your activities.  Dont lift anything over 10 lbs for the first 24 hours  Dont drive the day of the procedure Wait 24 hrs to drive.  Wait until tomorrow to resume any blood thinners (aspirin, Plavix, Coumadin) but you may resume all your other medications today.  If Diabetic, monitor you glucose carefully due to steroids  used for this procedure    Seek Medical Attention if you have:  Severe pain or headache  Fever or chills  Redness or swelling around the injection site   Difficulty breathing  Vomiting or Increasing numbness or weakness in arms or legs

## 2019-02-07 NOTE — PLAN OF CARE
Stable, states ready to go home, toy po fluids, able to hold legs off bed and stand without weakness, pain tolerable, ambulated to car with RN to friend

## 2019-02-07 NOTE — INTERVAL H&P NOTE
The patient has been examined and the H&P has been reviewed:    I concur with the findings and no changes have occurred since H&P was written.   This patient has been cleared for surgery in an ambulatory surgical facility    ASA 3,  Mallampatti Score 3  No history of anesthetic complications  Plan for RN IV sedation      Anesthesia/Surgery risks, benefits and alternative options discussed and understood by patient/family.          Active Hospital Problems    Diagnosis  POA    Other spondylosis, lumbar region [M47.896]  Yes      Resolved Hospital Problems   No resolved problems to display.

## 2019-02-07 NOTE — OP NOTE
PROCEDURE DATE: 2/7/2019    PROCEDURE:  Radiofrequency ablation of the L2,3,4,5 medial branch nerves on the bilateral-side utilizing fluoroscopy    DIAGNOSIS:  Other lumbar spondylosis  Post op Diagnosis: Same    PHYSICIAN: Charles Knowles MD    MEDICATIONS INJECTED:  From a mixture of 6ml of 0.25% bupivicaine and 80mg of methylprednisone,  1ml of this solution was injected at each level.    LOCAL ANESTHETIC USED: Lidocaine 1%, 2 ml given at each site.    SEDATION MEDICATIONS: RN IV sedation    ESTIMATED BLOOD LOSS:  None    COMPLICATIONS:  None    TECHNIQUE:  A time out was taken to identify patient and procedure side prior to starting the procedure. Laying in a prone position, the patient was prepped and draped in the usual sterile fashion using ChloraPrep and sterile towels.  The levels were determined under fluoroscopic guidance and then marked.  Local anesthetic was given by raising a wheal at the skin over each site and then infiltrated approximately 2cm deeper.  A 20-gauge  100 mm RF needle was introduced to the anatomic location of the bilateral L2,3,4,5 medial branch nerves.  Motor stimulation up to 2 Volts at each level confirmed no motor nerve involvement.  Impedance was less than 800 ohms at each level.  1ml of 2% lidocaine was instilled prior to lesioning.  Ablation was performed per level utilizing radiofrequency generator 80°C for 60 seconds.  Needles were then rotated 90° and a second lesion was performed.  The above noted medication was then injected slowly. The patient tolerated the procedure well.     The patient was monitored after the procedure.  Patient was given post procedure and discharge instructions to follow at home.  The patient was discharged in a stable condition

## 2019-02-07 NOTE — DISCHARGE SUMMARY
[facility]  Discharge Note  Short Stay    Admit Date: 2/7/2019    Discharge Date and Time: 2/7/2019    Attending Physician: Charles Knowles MD     Discharge Provider: Charles Knowles    Diagnoses:  Active Hospital Problems    Diagnosis  POA    *Other spondylosis, lumbar region [M47.896]  Yes      Resolved Hospital Problems   No resolved problems to display.       Hospital Course: Lumbar MARILIA  Discharged Condition: Good    Final Diagnoses:   Active Hospital Problems    Diagnosis  POA    *Other spondylosis, lumbar region [M47.896]  Yes      Resolved Hospital Problems   No resolved problems to display.       Disposition: Home or Self Care    Follow up/Patient Instructions:    Medications:  Reconciled Home Medications:      Medication List      CONTINUE taking these medications    aspirin 81 MG EC tablet  Commonly known as:  ECOTRIN  Take 81 mg by mouth once daily.     dulaglutide 0.75 mg/0.5 mL Pnij  Commonly known as:  TRULICITY  Inject 0.5 mLs (0.75 mg total) into the skin every 7 days.     gabapentin 300 MG capsule  Commonly known as:  NEURONTIN  Take 3 capsules (900 mg total) by mouth 3 (three) times daily.     glipiZIDE 5 MG Tr24  Commonly known as:  GLUCOTROL  Take 1 tablet (5 mg total) by mouth daily with breakfast.     lisinopril-hydrochlorothiazide 20-12.5 mg per tablet  Commonly known as:  PRINZIDE,ZESTORETIC  Take 1 tablet by mouth once daily.     metFORMIN 1000 MG tablet  Commonly known as:  GLUCOPHAGE  Take 1 tablet (1,000 mg total) by mouth 2 (two) times daily with meals.     pregabalin 150 MG capsule  Commonly known as:  LYRICA  Take 1 capsule (150 mg total) by mouth 2 (two) times daily.     traMADol 50 mg tablet  Commonly known as:  ULTRAM  Take 1 tablet (50 mg total) by mouth every 12 (twelve) hours as needed for Pain.          Discharge Procedure Orders   Call MD for:  temperature >100.4     Call MD for:  persistent nausea and vomiting or diarrhea     Call MD for:  severe uncontrolled pain     Call  MD for:  redness, tenderness, or signs of infection (pain, swelling, redness, odor or green/yellow discharge around incision site)     Call MD for:  difficulty breathing or increased cough     Call MD for:  severe persistent headache        Follow up with MD in 2-3 weeks    Discharge Procedure Orders (must include Diet, Follow-up, Activity):   Discharge Procedure Orders (must include Diet, Follow-up, Activity)   Call MD for:  temperature >100.4     Call MD for:  persistent nausea and vomiting or diarrhea     Call MD for:  severe uncontrolled pain     Call MD for:  redness, tenderness, or signs of infection (pain, swelling, redness, odor or green/yellow discharge around incision site)     Call MD for:  difficulty breathing or increased cough     Call MD for:  severe persistent headache

## 2019-02-08 VITALS
TEMPERATURE: 98 F | DIASTOLIC BLOOD PRESSURE: 68 MMHG | WEIGHT: 145 LBS | HEART RATE: 72 BPM | OXYGEN SATURATION: 99 % | RESPIRATION RATE: 20 BRPM | SYSTOLIC BLOOD PRESSURE: 115 MMHG | BODY MASS INDEX: 25.69 KG/M2 | HEIGHT: 63 IN

## 2019-02-12 ENCOUNTER — HOSPITAL ENCOUNTER (EMERGENCY)
Facility: HOSPITAL | Age: 59
Discharge: HOME | End: 2019-02-13
Attending: EMERGENCY MEDICINE
Payer: MEDICARE

## 2019-02-12 VITALS
HEART RATE: 87 BPM | SYSTOLIC BLOOD PRESSURE: 136 MMHG | DIASTOLIC BLOOD PRESSURE: 64 MMHG | WEIGHT: 144.19 LBS | HEIGHT: 62 IN | TEMPERATURE: 99 F | RESPIRATION RATE: 20 BRPM | OXYGEN SATURATION: 99 % | BODY MASS INDEX: 26.53 KG/M2

## 2019-02-12 DIAGNOSIS — G89.29 CHRONIC BILATERAL LOW BACK PAIN, WITH SCIATICA PRESENCE UNSPECIFIED: Primary | ICD-10-CM

## 2019-02-12 DIAGNOSIS — R52 PAIN: ICD-10-CM

## 2019-02-12 DIAGNOSIS — R11.2 NON-INTRACTABLE VOMITING WITH NAUSEA, UNSPECIFIED VOMITING TYPE: ICD-10-CM

## 2019-02-12 DIAGNOSIS — M54.5 CHRONIC BILATERAL LOW BACK PAIN, WITH SCIATICA PRESENCE UNSPECIFIED: Primary | ICD-10-CM

## 2019-02-12 LAB
ALBUMIN SERPL BCP-MCNC: 3.5 G/DL
ALP SERPL-CCNC: 59 U/L
ALT SERPL W/O P-5'-P-CCNC: 23 U/L
ANION GAP SERPL CALC-SCNC: 12 MMOL/L
AST SERPL-CCNC: 24 U/L
BASOPHILS # BLD AUTO: 0 K/UL
BASOPHILS NFR BLD: 0.4 %
BILIRUB SERPL-MCNC: 0.3 MG/DL
BUN SERPL-MCNC: 28 MG/DL
CALCIUM SERPL-MCNC: 9.1 MG/DL
CHLORIDE SERPL-SCNC: 101 MMOL/L
CO2 SERPL-SCNC: 25 MMOL/L
CREAT SERPL-MCNC: 0.9 MG/DL
DIFFERENTIAL METHOD: ABNORMAL
EOSINOPHIL # BLD AUTO: 0.5 K/UL
EOSINOPHIL NFR BLD: 3.4 %
ERYTHROCYTE [DISTWIDTH] IN BLOOD BY AUTOMATED COUNT: 13.4 %
EST. GFR  (AFRICAN AMERICAN): >60 ML/MIN/1.73 M^2
EST. GFR  (NON AFRICAN AMERICAN): >60 ML/MIN/1.73 M^2
GLUCOSE SERPL-MCNC: 143 MG/DL
HCT VFR BLD AUTO: 35.6 %
HGB BLD-MCNC: 11.8 G/DL
INFLUENZA A, MOLECULAR: NEGATIVE
INFLUENZA B, MOLECULAR: NEGATIVE
LYMPHOCYTES # BLD AUTO: 4.5 K/UL
LYMPHOCYTES NFR BLD: 32.1 %
MCH RBC QN AUTO: 29.3 PG
MCHC RBC AUTO-ENTMCNC: 33.2 G/DL
MCV RBC AUTO: 89 FL
MONOCYTES # BLD AUTO: 0.7 K/UL
MONOCYTES NFR BLD: 5.3 %
NEUTROPHILS # BLD AUTO: 8.2 K/UL
NEUTROPHILS NFR BLD: 58.8 %
PLATELET # BLD AUTO: 298 K/UL
PMV BLD AUTO: 9.2 FL
POTASSIUM SERPL-SCNC: 3.9 MMOL/L
PROT SERPL-MCNC: 7.8 G/DL
RBC # BLD AUTO: 4.03 M/UL
SODIUM SERPL-SCNC: 138 MMOL/L
SPECIMEN SOURCE: NORMAL
WBC # BLD AUTO: 13.9 K/UL

## 2019-02-12 PROCEDURE — 99284 EMERGENCY DEPT VISIT MOD MDM: CPT | Mod: 25

## 2019-02-12 PROCEDURE — 87502 INFLUENZA DNA AMP PROBE: CPT

## 2019-02-12 PROCEDURE — 36415 COLL VENOUS BLD VENIPUNCTURE: CPT

## 2019-02-12 PROCEDURE — 80053 COMPREHEN METABOLIC PANEL: CPT

## 2019-02-12 PROCEDURE — 25000003 PHARM REV CODE 250: Performed by: NURSE PRACTITIONER

## 2019-02-12 PROCEDURE — 85025 COMPLETE CBC W/AUTO DIFF WBC: CPT

## 2019-02-12 RX ORDER — HYDROCODONE BITARTRATE AND ACETAMINOPHEN 10; 325 MG/1; MG/1
1 TABLET ORAL
Status: COMPLETED | OUTPATIENT
Start: 2019-02-12 | End: 2019-02-12

## 2019-02-12 RX ORDER — ONDANSETRON 4 MG/1
8 TABLET, ORALLY DISINTEGRATING ORAL
Status: COMPLETED | OUTPATIENT
Start: 2019-02-12 | End: 2019-02-12

## 2019-02-12 RX ADMIN — HYDROCODONE BITARTRATE AND ACETAMINOPHEN 1 TABLET: 10; 325 TABLET ORAL at 10:02

## 2019-02-12 RX ADMIN — ONDANSETRON 8 MG: 4 TABLET, ORALLY DISINTEGRATING ORAL at 10:02

## 2019-02-13 ENCOUNTER — TELEPHONE (OUTPATIENT)
Dept: PAIN MEDICINE | Facility: CLINIC | Age: 59
End: 2019-02-13

## 2019-02-13 RX ORDER — NABUMETONE 500 MG/1
500 TABLET, FILM COATED ORAL 2 TIMES DAILY
Qty: 60 TABLET | Refills: 1 | Status: SHIPPED | OUTPATIENT
Start: 2019-02-13 | End: 2019-03-19

## 2019-02-13 RX ORDER — HYDROCODONE BITARTRATE AND ACETAMINOPHEN 5; 325 MG/1; MG/1
1 TABLET ORAL EVERY 4 HOURS PRN
Qty: 10 TABLET | Refills: 0 | Status: SHIPPED | OUTPATIENT
Start: 2019-02-13 | End: 2019-03-19

## 2019-02-13 RX ORDER — ONDANSETRON 4 MG/1
4 TABLET, FILM COATED ORAL EVERY 6 HOURS
Qty: 12 TABLET | Refills: 0 | Status: SHIPPED | OUTPATIENT
Start: 2019-02-13 | End: 2019-03-19

## 2019-02-13 NOTE — TELEPHONE ENCOUNTER
----- Message from Laina Mayo sent at 2/13/2019 10:33 AM CST -----  Contact: Patient  Patient is calling to speak with someone about coming in because she was at the ER last night.  She had back pain, nausea, vomiting and headaches.  Call Back#[phone]  Thanks

## 2019-02-13 NOTE — ED PROVIDER NOTES
Encounter Date: 2/12/2019    SCRIBE #1 NOTE: I, Jayme Sotelo, vivi scribing for, and in the presence of, Jennifer Alvarez NP.       History     Chief Complaint   Patient presents with    Back Pain     Procedure with Dr. Knowles on back last Thursday and hurting and vomiting since then with pain worsened tonight.      02/12/2019 9:53 PM    The pt is a 58 y.o. female with a past medical history of DDD, DM, HLD, and HTN presenting to the ED with a gradual onset of acute back pain beginning 4 days ago. The pt reported symptoms began after a radio frequency ablation of the lumbar with Dr. Knowles which progressively worsened. She stated having similar injections previous which caused no issues. The pt described the back pain as a constant severe which radiates to her R leg. Associated symptoms of dizziness, subjective fever, headache, nausea, vomiting 3x, and abdominal pain. She denied chills, numbness, difficulty urinating, and dysuria. The pt denied possible sick contact. She noted having normal bowel movements. The pt is a current cigarette smoker. She has a past surgical history of cholecystectomy.      The history is provided by the patient.     Review of patient's allergies indicates:  No Known Allergies  Past Medical History:   Diagnosis Date    DDD (degenerative disc disease), lumbar     Diabetes mellitus     Essential tremor     HLD (hyperlipidemia)     HTN (hypertension)      Past Surgical History:   Procedure Laterality Date    BLOCK- BRANCH- SACROILIAC Right 2/9/2018    [facility]    Block-nerve-medial branch-lumbar Bilateral 7/10/2018    [facility]    CHOLECYSTECTOMY      INJECTION-STEROID-EPIDURAL-LUMBAR Right 3/27/2018    [facility] at [facility]    INJECTION-STEROID-EPIDURAL-TRANSFORAMINAL Bilateral 5/21/2018    [facility]    RADIOFREQUENCY ABLATION, NERVE, MEDIAL BRANCH, LUMBAR, 1 LEVEL Bilateral 7/27/2018     [facility] at [facility]    Radiofrequency Ablation, Nerve, Spinal, Lumbar, Medial Branch, L2,3,4,5 Bilateral 2/7/2019    [facility] at [facility]     Family History   Problem Relation Age of Onset    Heart disease Mother     Macular degeneration Mother     Diabetes type II Father     Dementia Father     Heart disease Father      Social History     Tobacco Use    Smoking status: Current Every Day Smoker     Packs/day: 0.50     Years: 40.00     Pack years: 20.00    Smokeless tobacco: Never Used   Substance Use Topics    Alcohol use: Yes     Alcohol/week: 0.0 - 3.0 oz    Drug use: No     Review of Systems   Constitutional: Positive for fever. Negative for activity change, appetite change, chills and fatigue.   Eyes: Negative for visual disturbance.   Respiratory: Negative for apnea and shortness of breath.    Cardiovascular: Negative for chest pain and palpitations.   Gastrointestinal: Positive for abdominal pain, nausea and vomiting. Negative for abdominal distention, blood in stool and diarrhea.   Genitourinary: Negative for decreased urine volume, difficulty urinating, dysuria and hematuria.   Musculoskeletal: Positive for back pain. Negative for neck pain and neck stiffness.   Skin: Negative for pallor, rash and wound.   Neurological: Positive for dizziness and headaches. Negative for syncope and numbness.   Hematological: Does not bruise/bleed easily.   Psychiatric/Behavioral: Negative for agitation and confusion.       Physical Exam     Initial Vitals [02/12/19 2142]   BP Pulse Resp Temp SpO2   136/64 87 20 98.5 °F (36.9 °C) 99 %      MAP       --         Physical Exam    Nursing note and vitals reviewed.  Constitutional: She appears well-developed and well-nourished. She is active.   HENT:   Head: Normocephalic and atraumatic.   Eyes: Conjunctivae, EOM and lids are normal. Pupils are equal, round, and reactive to light. Right eye exhibits no chemosis and no discharge. Left  eye exhibits no chemosis and no discharge. Right conjunctiva is not injected. Left conjunctiva is not injected.   Neck: Trachea normal and normal range of motion. Neck supple. No stridor present. No tracheal deviation present. No neck rigidity.   Cardiovascular: Normal rate, regular rhythm, normal heart sounds and normal pulses. Exam reveals no distant heart sounds and no friction rub.    No murmur heard.  Pulmonary/Chest: Breath sounds normal. No stridor. She has no wheezes. She has no rhonchi. She has no rales.   Abdominal: Soft. Bowel sounds are normal. She exhibits no distension. There is no tenderness. There is no rebound and no guarding.   Musculoskeletal: Normal range of motion. She exhibits tenderness. She exhibits no edema.   Bilateral paraspinal lumbar TTP, no midline tenderness, BLE strength 5/5, pt able to ambulate without difficulty, no decreased sensation of BLE    Neurological: She is alert and oriented to person, place, and time. She has normal strength. GCS score is 15. GCS eye subscore is 4. GCS verbal subscore is 5. GCS motor subscore is 6.   Skin: Skin is warm, dry and intact. Capillary refill takes less than 2 seconds. No rash and no abscess noted. No erythema.   Psychiatric: She has a normal mood and affect. Her speech is normal and behavior is normal. Thought content normal.         ED Course   Procedures  Labs Reviewed   CBC W/ AUTO DIFFERENTIAL - Abnormal; Notable for the following components:       Result Value    WBC 13.90 (*)     Hemoglobin 11.8 (*)     Hematocrit 35.6 (*)     Gran # (ANC) 8.2 (*)     All other components within normal limits   COMPREHENSIVE METABOLIC PANEL - Abnormal; Notable for the following components:    Glucose 143 (*)     BUN, Bld 28 (*)     All other components within normal limits   INFLUENZA A & B BY MOLECULAR          Imaging Results          MRI Lumbar Spine Without Contrast (In process)                  Medical Decision Making:   History:   Old Medical  Records: I decided to obtain old medical records.  Differential Diagnosis:   Spinal abscess  Cauda equina  Saddle anesthesia   Gastroenteritis   Clinical Tests:   Lab Tests: Ordered and Reviewed  Radiological Study: Ordered and Reviewed       APC / Resident Notes:   The patient's back pain is likely a musculoskeletal strain and related to her chronic back pain.  There are no signs of saddle anesthesia, incontinence, neurologic deficits, fevers, trauma or midline tenderness on history or physical to suggest cauda equina, infectious process, fracture or subluxation.  MRI showed no acute findings. WBC slightly elevated, I suspect that pt may have viral gastroenteritis which caused her vomiting.   The patient does not appear dehydrated, septic, toxic and I doubt this is bacterial in nature given that the patient has no bloody stools, recent antibiotics, foreign travel, raw foods.  I do not think this is appendicitis, cholecystitis, obstruction, diverticulitis. The patient appears very well, hydrated, and is stable for discharge. I will treat with pain medication and zofran. Pt instructed to f/u with Dr Knowles tomorrow. Pt was given pain medication and zofran in ED with relief of symptoms. I have discussed pt with Dr Cancino who agrees with POC. Pt voices understanding and is agreeable to the plan.  She is given specific return precautions.        Scribe Attestation:   Scribe #1: I performed the above scribed service and the documentation accurately describes the services I performed. I attest to the accuracy of the note.    Attending Attestation:     Physician Attestation Statement for NP/PA:   I discussed this assessment and plan of this patient with the NP/PA, but I did not personally examine the patient. The face to face encounter was performed by the NP/PA.    Other NP/PA Attestation Additions:    History of Present Illness: 58-year-old female presented with a chief complaint of back pain.    Medical Decision Making:  Initial differential diagnosis included but not limited to diskitis, epidural abscess, and musculoskeletal pain.  I am in agreement with the nurse practitioner's assessment, treatment, and plan of care.         I, ALLISON Kingsley, personally performed the services described in this documentation. All medical record entries made by the scribe were at my direction and in my presence.  I have reviewed the chart and agree that the record reflects my personal performance and is accurate and complete. ALLISON Kingsley.  12:45 AM 02/13/2019           Clinical Impression:   The primary encounter diagnosis was Chronic bilateral low back pain, with sciatica presence unspecified. Diagnoses of Pain and Non-intractable vomiting with nausea, unspecified vomiting type were also pertinent to this visit.      Disposition:   Disposition: Discharged  Condition: Stable                        Jennifer Alvarez NP  02/13/19 0045       Senthil Cancino MD  02/13/19 0048

## 2019-02-13 NOTE — TELEPHONE ENCOUNTER
Pt called in c/o severe pain,  nausea and fever since lumbar RFA on 12/7/19. She was seen in ED last night and informed to follow up with Dr. Knowles. She rates her pain a 10/10.

## 2019-03-12 ENCOUNTER — TELEPHONE (OUTPATIENT)
Dept: FAMILY MEDICINE | Facility: CLINIC | Age: 59
End: 2019-03-12

## 2019-03-18 ENCOUNTER — DOCUMENTATION ONLY (OUTPATIENT)
Dept: FAMILY MEDICINE | Facility: CLINIC | Age: 59
End: 2019-03-18

## 2019-03-18 NOTE — PROGRESS NOTES
Pre-Visit Chart Review  For Appointment Scheduled on 3/19/19    Health Maintenance Due   Topic Date Due    TETANUS VACCINE  07/13/1979    Pap Smear with HPV Cotest  07/13/1982    Colonoscopy  07/13/2011    Foot Exam  01/29/2019    Lipid Panel  02/20/2019    Urine Microalbumin  02/20/2019    Eye Exam  04/16/2019

## 2019-03-19 ENCOUNTER — OFFICE VISIT (OUTPATIENT)
Dept: FAMILY MEDICINE | Facility: CLINIC | Age: 59
End: 2019-03-19
Payer: MEDICARE

## 2019-03-19 VITALS
DIASTOLIC BLOOD PRESSURE: 83 MMHG | BODY MASS INDEX: 26.82 KG/M2 | TEMPERATURE: 98 F | HEIGHT: 62 IN | SYSTOLIC BLOOD PRESSURE: 123 MMHG | HEART RATE: 103 BPM | WEIGHT: 145.75 LBS

## 2019-03-19 DIAGNOSIS — R80.9 TYPE 2 DIABETES MELLITUS WITH MICROALBUMINURIA, WITHOUT LONG-TERM CURRENT USE OF INSULIN: Primary | ICD-10-CM

## 2019-03-19 DIAGNOSIS — M54.16 LUMBAR RADICULITIS: ICD-10-CM

## 2019-03-19 DIAGNOSIS — E11.59 HYPERTENSION ASSOCIATED WITH DIABETES: ICD-10-CM

## 2019-03-19 DIAGNOSIS — E78.5 HYPERLIPIDEMIA, UNSPECIFIED HYPERLIPIDEMIA TYPE: ICD-10-CM

## 2019-03-19 DIAGNOSIS — M51.36 DDD (DEGENERATIVE DISC DISEASE), LUMBAR: ICD-10-CM

## 2019-03-19 DIAGNOSIS — E11.29 TYPE 2 DIABETES MELLITUS WITH MICROALBUMINURIA, WITHOUT LONG-TERM CURRENT USE OF INSULIN: Primary | ICD-10-CM

## 2019-03-19 DIAGNOSIS — I15.2 HYPERTENSION ASSOCIATED WITH DIABETES: ICD-10-CM

## 2019-03-19 PROCEDURE — 99999 PR PBB SHADOW E&M-EST. PATIENT-LVL III: CPT | Mod: PBBFAC,,, | Performed by: FAMILY MEDICINE

## 2019-03-19 PROCEDURE — 99214 PR OFFICE/OUTPT VISIT, EST, LEVL IV, 30-39 MIN: ICD-10-PCS | Mod: S$PBB,,, | Performed by: FAMILY MEDICINE

## 2019-03-19 PROCEDURE — 99213 OFFICE O/P EST LOW 20 MIN: CPT | Mod: PBBFAC,PO | Performed by: FAMILY MEDICINE

## 2019-03-19 PROCEDURE — 99499 UNLISTED E&M SERVICE: CPT | Mod: S$PBB,,, | Performed by: FAMILY MEDICINE

## 2019-03-19 PROCEDURE — 99214 OFFICE O/P EST MOD 30 MIN: CPT | Mod: S$PBB,,, | Performed by: FAMILY MEDICINE

## 2019-03-19 PROCEDURE — 99499 RISK ADDL DX/OHS AUDIT: ICD-10-PCS | Mod: S$PBB,,, | Performed by: FAMILY MEDICINE

## 2019-03-19 PROCEDURE — 99999 PR PBB SHADOW E&M-EST. PATIENT-LVL III: ICD-10-PCS | Mod: PBBFAC,,, | Performed by: FAMILY MEDICINE

## 2019-03-19 PROCEDURE — 96372 THER/PROPH/DIAG INJ SC/IM: CPT | Mod: PBBFAC,PO

## 2019-03-19 RX ORDER — PREGABALIN 200 MG/1
200 CAPSULE ORAL 2 TIMES DAILY
Qty: 60 CAPSULE | Refills: 2 | Status: SHIPPED | OUTPATIENT
Start: 2019-03-19 | End: 2019-10-01

## 2019-03-19 RX ORDER — KETOROLAC TROMETHAMINE 30 MG/ML
30 INJECTION, SOLUTION INTRAMUSCULAR; INTRAVENOUS
Status: COMPLETED | OUTPATIENT
Start: 2019-03-19 | End: 2019-03-19

## 2019-03-19 RX ADMIN — KETOROLAC TROMETHAMINE 30 MG: 30 INJECTION, SOLUTION INTRAMUSCULAR; INTRAVENOUS at 10:03

## 2019-03-19 NOTE — PROGRESS NOTES
Subjective:   Patient ID: Mich Knight is a 58 y.o. female     Chief Complaint:Establish Care      Patient with type 2 diabetes which is currently managed with oral medications.  Patient with hyperlipidemia which currently stable on statin.  Patient with hypertension well controlled with medications.      Review of Systems   Constitutional: Positive for fatigue.   Respiratory: Negative for shortness of breath.    Cardiovascular: Negative for chest pain.   Gastrointestinal: Negative for abdominal pain.   Endocrine: Positive for polydipsia and polyuria. Negative for polyphagia.   Genitourinary: Negative for dysuria.   Skin: Negative for pallor.   Neurological: Positive for dizziness, tremors and weakness. Negative for seizures, speech difficulty and headaches.   Psychiatric/Behavioral: Negative for confusion. The patient is nervous/anxious.      Past Medical History:   Diagnosis Date    DDD (degenerative disc disease), lumbar     Diabetes mellitus     Essential tremor     HLD (hyperlipidemia)     HTN (hypertension)      Objective:     Vitals:    03/19/19 1016   BP: 123/83   Pulse: 103   Temp: 98.2 °F (36.8 °C)     Body mass index is 26.65 kg/m².  Physical Exam   Neck: Neck supple. No tracheal deviation present.   Cardiovascular: Normal rate, regular rhythm and normal heart sounds.   Pulses:       Dorsalis pedis pulses are 2+ on the right side, and 2+ on the left side.        Posterior tibial pulses are 2+ on the right side, and 2+ on the left side.   Pulmonary/Chest: Effort normal. No respiratory distress.   Musculoskeletal: Normal range of motion. She exhibits no edema.        Right foot: There is normal range of motion and no deformity.        Left foot: There is normal range of motion and no deformity.   Feet:   Right Foot:   Protective Sensation: 4 sites tested. 4 sites sensed.   Skin Integrity: Negative for ulcer or blister.   Left Foot:   Protective Sensation: 4 sites tested. 4 sites sensed.   Skin  Integrity: Negative for ulcer or blister.     Assessment:     1. Type 2 diabetes mellitus with microalbuminuria, without long-term current use of insulin    2. DDD (degenerative disc disease), lumbar    3. Lumbar radiculitis    4. Hypertension associated with diabetes    5. Hyperlipidemia, unspecified hyperlipidemia type      Plan:   Type 2 diabetes mellitus with microalbuminuria, without long-term current use of insulin  -     Comprehensive metabolic panel; Future; Expected date: 03/19/2019  -     Hemoglobin A1c; Future; Expected date: 03/19/2019  -     Lipid panel; Future; Expected date: 03/19/2019  -     MICROALBUMIN / CREATININE RATIO URINE; Future; Expected date: 03/19/2019    DDD (degenerative disc disease), lumbar  -     ketorolac injection 30 mg  -     pregabalin (LYRICA) 200 MG Cap; Take 1 capsule (200 mg total) by mouth 2 (two) times daily.  Dispense: 60 capsule; Refill: 2    Lumbar radiculitis  -     pregabalin (LYRICA) 200 MG Cap; Take 1 capsule (200 mg total) by mouth 2 (two) times daily.  Dispense: 60 capsule; Refill: 2    Hypertension associated with diabetes  Patient retention well controlled well medications.  Will continue monitor    Hyperlipidemia, unspecified hyperlipidemia type  Patient hyperlipidemia, stable on statin          Time spent with patient: 30 minutes and over half of that time was spent on counseling an coordination of care.    Huang Nicholson MD  03/19/2019    Portions of this note have been dictated with NADEEM Sims

## 2019-03-19 NOTE — PROGRESS NOTES
Administered 30 mg Ketorolac (Toradol), IM. Identified patient's name&. Patient tolerated well, aseptic technique maintained. Pain scale 0/10 with injection.

## 2019-03-20 ENCOUNTER — TELEPHONE (OUTPATIENT)
Dept: PAIN MEDICINE | Facility: CLINIC | Age: 59
End: 2019-03-20

## 2019-03-20 ENCOUNTER — LAB VISIT (OUTPATIENT)
Dept: LAB | Facility: HOSPITAL | Age: 59
End: 2019-03-20
Attending: FAMILY MEDICINE
Payer: MEDICARE

## 2019-03-20 DIAGNOSIS — R80.9 TYPE 2 DIABETES MELLITUS WITH MICROALBUMINURIA, WITHOUT LONG-TERM CURRENT USE OF INSULIN: ICD-10-CM

## 2019-03-20 DIAGNOSIS — E11.29 TYPE 2 DIABETES MELLITUS WITH MICROALBUMINURIA, WITHOUT LONG-TERM CURRENT USE OF INSULIN: ICD-10-CM

## 2019-03-20 LAB
ALBUMIN SERPL BCP-MCNC: 3.5 G/DL
ALP SERPL-CCNC: 56 U/L
ALT SERPL W/O P-5'-P-CCNC: 23 U/L
ANION GAP SERPL CALC-SCNC: 9 MMOL/L
AST SERPL-CCNC: 26 U/L
BILIRUB SERPL-MCNC: 0.4 MG/DL
BUN SERPL-MCNC: 19 MG/DL
CALCIUM SERPL-MCNC: 10.3 MG/DL
CHLORIDE SERPL-SCNC: 104 MMOL/L
CHOLEST SERPL-MCNC: 217 MG/DL
CHOLEST/HDLC SERPL: 5.7 {RATIO}
CO2 SERPL-SCNC: 27 MMOL/L
CREAT SERPL-MCNC: 0.9 MG/DL
EST. GFR  (AFRICAN AMERICAN): >60 ML/MIN/1.73 M^2
EST. GFR  (NON AFRICAN AMERICAN): >60 ML/MIN/1.73 M^2
ESTIMATED AVG GLUCOSE: 137 MG/DL
GLUCOSE SERPL-MCNC: 168 MG/DL
HBA1C MFR BLD HPLC: 6.4 %
HDLC SERPL-MCNC: 38 MG/DL
HDLC SERPL: 17.5 %
LDLC SERPL CALC-MCNC: 112 MG/DL
NONHDLC SERPL-MCNC: 179 MG/DL
POTASSIUM SERPL-SCNC: 4.2 MMOL/L
PROT SERPL-MCNC: 7.7 G/DL
SODIUM SERPL-SCNC: 140 MMOL/L
TRIGL SERPL-MCNC: 335 MG/DL

## 2019-03-20 PROCEDURE — 80061 LIPID PANEL: CPT

## 2019-03-20 PROCEDURE — 36415 COLL VENOUS BLD VENIPUNCTURE: CPT | Mod: PO

## 2019-03-20 PROCEDURE — 80053 COMPREHEN METABOLIC PANEL: CPT

## 2019-03-20 PROCEDURE — 83036 HEMOGLOBIN GLYCOSYLATED A1C: CPT

## 2019-03-20 RX ORDER — DICLOFENAC SODIUM 75 MG/1
75 TABLET, DELAYED RELEASE ORAL 2 TIMES DAILY
Qty: 60 TABLET | Refills: 0 | Status: SHIPPED | OUTPATIENT
Start: 2019-03-20 | End: 2019-05-01 | Stop reason: SDUPTHER

## 2019-03-20 NOTE — TELEPHONE ENCOUNTER
Patient stated that her insurance does not cover Nabumetone. Stated that it will cover Mobic, Diclofenac, IBP, or Naproxen.

## 2019-03-21 ENCOUNTER — PATIENT MESSAGE (OUTPATIENT)
Dept: PAIN MEDICINE | Facility: CLINIC | Age: 59
End: 2019-03-21

## 2019-03-22 ENCOUNTER — OFFICE VISIT (OUTPATIENT)
Dept: PAIN MEDICINE | Facility: CLINIC | Age: 59
End: 2019-03-22
Payer: MEDICARE

## 2019-03-22 ENCOUNTER — TELEPHONE (OUTPATIENT)
Dept: PAIN MEDICINE | Facility: CLINIC | Age: 59
End: 2019-03-22

## 2019-03-22 VITALS
SYSTOLIC BLOOD PRESSURE: 151 MMHG | WEIGHT: 145 LBS | HEIGHT: 62 IN | DIASTOLIC BLOOD PRESSURE: 98 MMHG | HEART RATE: 95 BPM | BODY MASS INDEX: 26.68 KG/M2

## 2019-03-22 DIAGNOSIS — M47.896 OTHER SPONDYLOSIS, LUMBAR REGION: ICD-10-CM

## 2019-03-22 DIAGNOSIS — M54.16 LUMBAR RADICULITIS: ICD-10-CM

## 2019-03-22 DIAGNOSIS — M51.36 DDD (DEGENERATIVE DISC DISEASE), LUMBAR: Primary | ICD-10-CM

## 2019-03-22 PROCEDURE — 99999 PR PBB SHADOW E&M-EST. PATIENT-LVL III: CPT | Mod: PBBFAC,,, | Performed by: ANESTHESIOLOGY

## 2019-03-22 PROCEDURE — 99999 PR PBB SHADOW E&M-EST. PATIENT-LVL III: ICD-10-PCS | Mod: PBBFAC,,, | Performed by: ANESTHESIOLOGY

## 2019-03-22 PROCEDURE — 99213 OFFICE O/P EST LOW 20 MIN: CPT | Mod: PBBFAC,PN | Performed by: ANESTHESIOLOGY

## 2019-03-22 PROCEDURE — 99214 OFFICE O/P EST MOD 30 MIN: CPT | Mod: S$PBB,,, | Performed by: ANESTHESIOLOGY

## 2019-03-22 PROCEDURE — 99214 PR OFFICE/OUTPT VISIT, EST, LEVL IV, 30-39 MIN: ICD-10-PCS | Mod: S$PBB,,, | Performed by: ANESTHESIOLOGY

## 2019-03-22 RX ORDER — HYDROCODONE BITARTRATE AND ACETAMINOPHEN 5; 325 MG/1; MG/1
1 TABLET ORAL EVERY 12 HOURS PRN
Qty: 60 TABLET | Refills: 0 | Status: SHIPPED | OUTPATIENT
Start: 2019-03-22 | End: 2019-04-21

## 2019-03-22 NOTE — TELEPHONE ENCOUNTER
----- Message from Ric Tuttle sent at 3/22/2019  2:32 PM CDT -----  Contact: Caroline Dumont  Type:  Pharmacy Calling to Clarify an RX    Name of Caller:  Caroline  Pharmacy Name:    [id number] - [place] - [address]  [place] [address]  Phone: [phone] Fax: [phone]  Prescription Name:  HYDROcodone-acetaminophen (NORCO) 5-325 mg per tablet  What do they need to clarify?:  Not disclosed other than a lengthy discuss is needed  Best Call Back Number:  [phone]  Additional Information:  NA

## 2019-03-22 NOTE — PROGRESS NOTES
Referring Physician: No ref. provider found    PCP: Huang Nicholson MD    CC:R low back and R leg pain    Interval History:  Mich Knight is a 58 y.o. female with low back pain who returns to our clinic.  She underwent repeat lumbar MB RFA procedure in 2/2019.   Patient states having some mild benefit of her lower back pain similar to her more previous procedure. However, patient had worsening right buttock and right leg pain. She was seen in the ED.  Updated lumbar MRI was performed. No acute changes were found.  She does have history of lumbar stenosis and severe neural foraminal narrowing at L4-5 and L5-S1.  She has not had any recent lumbar MARILIA.  She has had right-sided lumbar MARILIA eyes with moderate benefit in the past.  She has not been evaluated by Neurosurgery recently as well.  She is currently taking Lyrica prescribed by her PCP with mild benefits.  She currently takes tramadol with minimal benefit. She denies any leg weakness. No bowel or bladder incontinence. She has tried physical therapy with mild benefits.  EMG study recently performed showed peripheral neuropathy.    Prior HPI:   Mich Knight is a 58 y.o. female with a history of low back pain who recently moved down from [place]. Within the last 2 years she has had progression of low back pain and was seeing a PM&R physician in [place] (Dr. Ramu Lugo in [place]). She was receiving epidurals every 4 months, which she states was helping her pain, with TPIs every month in between. The pain begins in her low back, radiates primarily to her R buttock and down the leg to above the knee. She rarely has pain that goes past her R knee. She has concomitant diabetic neuropathy for which she recently increased her gabapentin dose. The pain as described as throbbing, shooting, deep, exacerbated by standing, sitting, bending, and walking. She denies any leg weakness. No bowel or bladder incontinence. Previous bilateral L4-5 TFESI provided only 60% relief  "for 1 week. Previous right sided L4-5 TFESI provided 50% relief for 4 days.  She continues to be very disappointed because in the past in [place] she had good relief for 3-4 months with each MARILIA. She is also s/p right SIJ injection that provided her with very good relief for almost 2 weeks. She was evaluated by Dr. Martinez who did not recommend surgery. She states she was offered back surgery in 2009 and 2010 by 2 surgeons in [place] for "missing discs".        Interventional history: s/p B/L L2-5 MB RFA on 7/2018 and 2/2019 with moderate relief of her lower back pain    ROS:  CONSTITUTIONAL: No fevers, chills, night sweats, wt. loss, appetite changes  SKIN: no rashes or itching  ENT: No headaches, head trauma, vision changes, or eye pain  LYMPH NODES: None noticed   CV: No chest pain, palpitations.   RESP: No shortness of breath, dyspnea on exertion, cough, wheezing, or hemoptysis  GI: No nausea, emesis, diarrhea, constipation, melena, hematochezia, pain.    : No dysuria, hematuria, urgency, or frequency   HEME: No easy bruising, bleeding problems  PSYCHIATRIC: No anxiety, psychosis, hallucinations. +ve depression  NEURO: No seizures, memory loss, dizziness or difficulty sleeping  MSK: +ve per HPI      Past Medical History:   Diagnosis Date    DDD (degenerative disc disease), lumbar     Diabetes mellitus     Essential tremor     HLD (hyperlipidemia)     HTN (hypertension)      Past Surgical History:   Procedure Laterality Date    BLOCK- BRANCH- SACROILIAC Right 2/9/2018    [facility]    Block-nerve-medial branch-lumbar Bilateral 7/10/2018    [facility]    CHOLECYSTECTOMY      INJECTION-STEROID-EPIDURAL-LUMBAR Right 3/27/2018    [facility]    INJECTION-STEROID-EPIDURAL-TRANSFORAMINAL Bilateral 5/21/2018    [facility]    RADIOFREQUENCY ABLATION, NERVE, MEDIAL BRANCH, LUMBAR, 1 LEVEL Bilateral " "7/27/2018    [facility] at [facility] OR    Radiofrequency Ablation, Nerve, Spinal, Lumbar, Medial Branch, L2,3,4,5 Bilateral 2/7/2019    [facility] at [facility]     Family History   Problem Relation Age of Onset    Heart disease Mother     Macular degeneration Mother     Diabetes type II Father     Dementia Father     Heart disease Father      Social History     Socioeconomic History    Marital status:      Spouse name: None    Number of children: None    Years of education: None    Highest education level: None   Social Needs    Financial resource strain: None    Food insecurity - worry: None    Food insecurity - inability: None    Transportation needs - medical: None    Transportation needs - non-medical: None   Occupational History    None   Tobacco Use    Smoking status: Current Every Day Smoker     Packs/day: 0.50     Years: 40.00     Pack years: 20.00    Smokeless tobacco: Never Used   Substance and Sexual Activity    Alcohol use: Yes     Alcohol/week: 0.0 - 3.0 oz    Drug use: No    Sexual activity: Not Currently   Other Topics Concern    None   Social History Narrative    Does not work, reports being disabled due to back pain. Previously was .          Medications/Allergies: See med card    Vitals:    03/22/19 1042   BP: (!) 151/98   Pulse: 95   Weight: 65.8 kg (145 lb)   Height: 5' 2" (1.575 m)   PainSc: 10-Worst pain ever   PainLoc: Back         Physical exam:    GENERAL: A and O x3, the patient appears well groomed and is in no acute distress.  Skin: No rashes or obvious lesions  HEENT: normocephalic, atraumatic  CARDIOVASCULAR:  RRR  LUNGS: non labored breathing  ABDOMEN: soft, nontender   UPPER EXTREMITIES: Normal alignment, normal range of motion, no atrophy, no skin changes,  hair growth and nail growth normal and equal bilaterally. No swelling, no tenderness.    LOWER EXTREMITIES:  Normal alignment, normal range of motion, no " atrophy, no skin changes,  hair growth and nail growth normal and equal bilaterally. No swelling, no tenderness.    LUMBAR SPINE  Lumbar spine: ROM is full with flexion extension and oblique extension with  increased pain.    Levi's test causes increased pain on R side with bilateral maneuvers  Supine straight leg raise is negative bilaterally.    Internal and external rotation of the hip causes no increased pain on either side.  Myofascial exam: severe tenderness with palpation along lower lumbar region, +ve tenderness along R SI joint      MENTAL STATUS: normal orientation, speech, language, and fund of knowledge for social situation.  Emotional state appropriate.    CRANIAL NERVES:  II:  PERRL bilaterally,   III,IV,VI: EOMI.    V:  Facial sensation equal bilaterally  VII:  Facial motor function normal.  VIII:  Hearing equal to finger rub bilaterally  IX/X: Gag normal, palate symmetric  XI:  Shoulder shrug equal, head turn equal  XII:  Tongue midline without fasciculations      MOTOR: Tone and bulk: normal bilateral upper and lower Strength: normal   Delt Bi Tri WE WF     R 5 5 5 5 5 5   L 5 5 5 5 5 5     IP ADD ABD Quad TA Gas HAM  R 4 4 4 4 4 4 4  L 4 4 4 4 4 4 4    SENSATION: Light touch and pinprick intact bilaterally  REFLEXES: normal, symmetric, nonbrisk.  Toes down, no clonus. No hoffmans.  GAIT: normal rise, base, steps, and arm swing.        Imaging:  MRI L-spine 9/2016        L4-5, moderate disc desiccation.  Concentric disc bulge with small central protrusion.  Left greater than right subarticular protrusion.  Moderate bilateral facet arthropathy.  Mild central canal stenosis.  Moderate advanced left foraminal stenosis.  L5-S1 advanced disc desiccation loss of disc height.  Broad-based disc protrusion to the right.  Moderate bilateral facet arthropathy.  There is advanced right foraminal stenosis.  A minimal left foraminal stenosis.    Assessment:  56 yo female with   1. DDD (degenerative disc  disease), lumbar    2. Lumbar radiculitis    3. Other spondylosis, lumbar region        Plan:  1. I have stressed the importance of physical activity and exercise to improve overall health  2. I think that the patient's back pain and radicular leg symptoms are due to degenerative disc disease and have recommended a lumbar epidural steroid injection to the Right L4-5 and L5-S1 level(s).  3.  May consider neurosurgery consultation of above is not helpful.  4.  Continue Lyrica as prescribed.  She can take hydrocodone 5 mg q.12 hours as needed for pain.  5.  Follow-up after the procedure

## 2019-03-22 NOTE — H&P (VIEW-ONLY)
Referring Physician: No ref. provider found    PCP: Huang Nicholson MD    CC:R low back and R leg pain    Interval History:  Mich Knight is a 58 y.o. female with low back pain who returns to our clinic.  She underwent repeat lumbar MB RFA procedure in 2/2019.   Patient states having some mild benefit of her lower back pain similar to her more previous procedure. However, patient had worsening right buttock and right leg pain. She was seen in the ED.  Updated lumbar MRI was performed. No acute changes were found.  She does have history of lumbar stenosis and severe neural foraminal narrowing at L4-5 and L5-S1.  She has not had any recent lumbar MARILIA.  She has had right-sided lumbar MARILIA eyes with moderate benefit in the past.  She has not been evaluated by Neurosurgery recently as well.  She is currently taking Lyrica prescribed by her PCP with mild benefits.  She currently takes tramadol with minimal benefit. She denies any leg weakness. No bowel or bladder incontinence. She has tried physical therapy with mild benefits.  EMG study recently performed showed peripheral neuropathy.    Prior HPI:   Mich Knight is a 58 y.o. female with a history of low back pain who recently moved down from [place]. Within the last 2 years she has had progression of low back pain and was seeing a PM&R physician in [place] (Dr. Ramu Lugo in [place]). She was receiving epidurals every 4 months, which she states was helping her pain, with TPIs every month in between. The pain begins in her low back, radiates primarily to her R buttock and down the leg to above the knee. She rarely has pain that goes past her R knee. She has concomitant diabetic neuropathy for which she recently increased her gabapentin dose. The pain as described as throbbing, shooting, deep, exacerbated by standing, sitting, bending, and walking. She denies any leg weakness. No bowel or bladder incontinence. Previous bilateral L4-5 TFESI provided only 60% relief  "for 1 week. Previous right sided L4-5 TFESI provided 50% relief for 4 days.  She continues to be very disappointed because in the past in [place] she had good relief for 3-4 months with each MARILIA. She is also s/p right SIJ injection that provided her with very good relief for almost 2 weeks. She was evaluated by Dr. Martinez who did not recommend surgery. She states she was offered back surgery in 2009 and 2010 by 2 surgeons in [place] for "missing discs".        Interventional history: s/p B/L L2-5 MB RFA on 7/2018 and 2/2019 with moderate relief of her lower back pain    ROS:  CONSTITUTIONAL: No fevers, chills, night sweats, wt. loss, appetite changes  SKIN: no rashes or itching  ENT: No headaches, head trauma, vision changes, or eye pain  LYMPH NODES: None noticed   CV: No chest pain, palpitations.   RESP: No shortness of breath, dyspnea on exertion, cough, wheezing, or hemoptysis  GI: No nausea, emesis, diarrhea, constipation, melena, hematochezia, pain.    : No dysuria, hematuria, urgency, or frequency   HEME: No easy bruising, bleeding problems  PSYCHIATRIC: No anxiety, psychosis, hallucinations. +ve depression  NEURO: No seizures, memory loss, dizziness or difficulty sleeping  MSK: +ve per HPI      Past Medical History:   Diagnosis Date    DDD (degenerative disc disease), lumbar     Diabetes mellitus     Essential tremor     HLD (hyperlipidemia)     HTN (hypertension)      Past Surgical History:   Procedure Laterality Date    BLOCK- BRANCH- SACROILIAC Right 2/9/2018    [facility]    Block-nerve-medial branch-lumbar Bilateral 7/10/2018    [facility]    CHOLECYSTECTOMY      INJECTION-STEROID-EPIDURAL-LUMBAR Right 3/27/2018    [facility]    INJECTION-STEROID-EPIDURAL-TRANSFORAMINAL Bilateral 5/21/2018    [facility]    RADIOFREQUENCY ABLATION, NERVE, MEDIAL BRANCH, LUMBAR, 1 LEVEL Bilateral " "7/27/2018    [facility]    Radiofrequency Ablation, Nerve, Spinal, Lumbar, Medial Branch, L2,3,4,5 Bilateral 2/7/2019    [facility]     Family History   Problem Relation Age of Onset    Heart disease Mother     Macular degeneration Mother     Diabetes type II Father     Dementia Father     Heart disease Father      Social History     Socioeconomic History    Marital status:      Spouse name: None    Number of children: None    Years of education: None    Highest education level: None   Social Needs    Financial resource strain: None    Food insecurity - worry: None    Food insecurity - inability: None    Transportation needs - medical: None    Transportation needs - non-medical: None   Occupational History    None   Tobacco Use    Smoking status: Current Every Day Smoker     Packs/day: 0.50     Years: 40.00     Pack years: 20.00    Smokeless tobacco: Never Used   Substance and Sexual Activity    Alcohol use: Yes     Alcohol/week: 0.0 - 3.0 oz    Drug use: No    Sexual activity: Not Currently   Other Topics Concern    None   Social History Narrative    Does not work, reports being disabled due to back pain. Previously was .          Medications/Allergies: See med card    Vitals:    03/22/19 1042   BP: (!) 151/98   Pulse: 95   Weight: 65.8 kg (145 lb)   Height: 5' 2" (1.575 m)   PainSc: 10-Worst pain ever   PainLoc: Back         Physical exam:    GENERAL: A and O x3, the patient appears well groomed and is in no acute distress.  Skin: No rashes or obvious lesions  HEENT: normocephalic, atraumatic  CARDIOVASCULAR:  RRR  LUNGS: non labored breathing  ABDOMEN: soft, nontender   UPPER EXTREMITIES: Normal alignment, normal range of motion, no atrophy, no skin changes,  hair growth and nail growth normal and equal bilaterally. No swelling, no tenderness.    LOWER EXTREMITIES:  Normal alignment, normal range of motion, no " atrophy, no skin changes,  hair growth and nail growth normal and equal bilaterally. No swelling, no tenderness.    LUMBAR SPINE  Lumbar spine: ROM is full with flexion extension and oblique extension with  increased pain.    Levi's test causes increased pain on R side with bilateral maneuvers  Supine straight leg raise is negative bilaterally.    Internal and external rotation of the hip causes no increased pain on either side.  Myofascial exam: severe tenderness with palpation along lower lumbar region, +ve tenderness along R SI joint      MENTAL STATUS: normal orientation, speech, language, and fund of knowledge for social situation.  Emotional state appropriate.    CRANIAL NERVES:  II:  PERRL bilaterally,   III,IV,VI: EOMI.    V:  Facial sensation equal bilaterally  VII:  Facial motor function normal.  VIII:  Hearing equal to finger rub bilaterally  IX/X: Gag normal, palate symmetric  XI:  Shoulder shrug equal, head turn equal  XII:  Tongue midline without fasciculations      MOTOR: Tone and bulk: normal bilateral upper and lower Strength: normal   Delt Bi Tri WE WF     R 5 5 5 5 5 5   L 5 5 5 5 5 5     IP ADD ABD Quad TA Gas HAM  R 4 4 4 4 4 4 4  L 4 4 4 4 4 4 4    SENSATION: Light touch and pinprick intact bilaterally  REFLEXES: normal, symmetric, nonbrisk.  Toes down, no clonus. No hoffmans.  GAIT: normal rise, base, steps, and arm swing.        Imaging:  MRI L-spine 9/2016        L4-5, moderate disc desiccation.  Concentric disc bulge with small central protrusion.  Left greater than right subarticular protrusion.  Moderate bilateral facet arthropathy.  Mild central canal stenosis.  Moderate advanced left foraminal stenosis.  L5-S1 advanced disc desiccation loss of disc height.  Broad-based disc protrusion to the right.  Moderate bilateral facet arthropathy.  There is advanced right foraminal stenosis.  A minimal left foraminal stenosis.    Assessment:  56 yo female with   1. DDD (degenerative disc  disease), lumbar    2. Lumbar radiculitis    3. Other spondylosis, lumbar region        Plan:  1. I have stressed the importance of physical activity and exercise to improve overall health  2. I think that the patient's back pain and radicular leg symptoms are due to degenerative disc disease and have recommended a lumbar epidural steroid injection to the Right L4-5 and L5-S1 level(s).  3.  May consider neurosurgery consultation of above is not helpful.  4.  Continue Lyrica as prescribed.  She can take hydrocodone 5 mg q.12 hours as needed for pain.  5.  Follow-up after the procedure

## 2019-03-25 DIAGNOSIS — M54.16 LUMBAR RADICULOPATHY: Primary | ICD-10-CM

## 2019-04-01 DIAGNOSIS — E11.65 UNCONTROLLED TYPE 2 DIABETES MELLITUS WITH HYPERGLYCEMIA: ICD-10-CM

## 2019-04-03 ENCOUNTER — PATIENT MESSAGE (OUTPATIENT)
Dept: ADMINISTRATIVE | Facility: HOSPITAL | Age: 59
End: 2019-04-03

## 2019-04-08 ENCOUNTER — HOSPITAL ENCOUNTER (OUTPATIENT)
Facility: AMBULARY SURGERY CENTER | Age: 59
Discharge: HOME | End: 2019-04-08
Attending: ANESTHESIOLOGY | Admitting: ANESTHESIOLOGY
Payer: MEDICARE

## 2019-04-08 DIAGNOSIS — M54.16 LUMBAR RADICULITIS: Primary | ICD-10-CM

## 2019-04-08 PROCEDURE — 99152 MOD SED SAME PHYS/QHP 5/>YRS: CPT | Mod: ,,, | Performed by: ANESTHESIOLOGY

## 2019-04-08 PROCEDURE — 64483 NJX AA&/STRD TFRM EPI L/S 1: CPT | Performed by: ANESTHESIOLOGY

## 2019-04-08 PROCEDURE — 64484 PRA INJECT ANES/STEROID FORAMEN LUMBAR/SACRAL W IMG GUIDE ,EA ADD LEVEL: ICD-10-PCS | Mod: RT,,, | Performed by: ANESTHESIOLOGY

## 2019-04-08 PROCEDURE — 64484 NJX AA&/STRD TFRM EPI L/S EA: CPT | Mod: RT,,, | Performed by: ANESTHESIOLOGY

## 2019-04-08 PROCEDURE — 64483 NJX AA&/STRD TFRM EPI L/S 1: CPT | Mod: RT,,, | Performed by: ANESTHESIOLOGY

## 2019-04-08 PROCEDURE — 64484 NJX AA&/STRD TFRM EPI L/S EA: CPT | Performed by: ANESTHESIOLOGY

## 2019-04-08 PROCEDURE — 64483 PR EPIDURAL INJ, ANES/STEROID, TRANSFORAMINAL, LUMB/SACR, SNGL LEVL: ICD-10-PCS | Mod: RT,,, | Performed by: ANESTHESIOLOGY

## 2019-04-08 PROCEDURE — 99152 PR MOD CONSCIOUS SEDATION, SAME PHYS, 5+ YRS, FIRST 15 MIN: ICD-10-PCS | Mod: ,,, | Performed by: ANESTHESIOLOGY

## 2019-04-08 RX ORDER — DEXAMETHASONE SODIUM PHOSPHATE 10 MG/ML
INJECTION INTRAMUSCULAR; INTRAVENOUS
Status: DISCONTINUED
Start: 2019-04-08 | End: 2019-04-08 | Stop reason: HOSPADM

## 2019-04-08 RX ORDER — BUPIVACAINE HYDROCHLORIDE 2.5 MG/ML
INJECTION, SOLUTION EPIDURAL; INFILTRATION; INTRACAUDAL
Status: DISCONTINUED | OUTPATIENT
Start: 2019-04-08 | End: 2019-04-08 | Stop reason: HOSPADM

## 2019-04-08 RX ORDER — DEXAMETHASONE SODIUM PHOSPHATE 10 MG/ML
INJECTION INTRAMUSCULAR; INTRAVENOUS
Status: DISCONTINUED | OUTPATIENT
Start: 2019-04-08 | End: 2019-04-08 | Stop reason: HOSPADM

## 2019-04-08 RX ORDER — ALPRAZOLAM 1 MG/1
1 TABLET ORAL ONCE
Status: COMPLETED | OUTPATIENT
Start: 2019-04-08 | End: 2019-04-08

## 2019-04-08 RX ORDER — BUPIVACAINE HYDROCHLORIDE 2.5 MG/ML
INJECTION, SOLUTION EPIDURAL; INFILTRATION; INTRACAUDAL
Status: DISCONTINUED
Start: 2019-04-08 | End: 2019-04-08 | Stop reason: HOSPADM

## 2019-04-08 RX ORDER — SODIUM CHLORIDE, SODIUM LACTATE, POTASSIUM CHLORIDE, CALCIUM CHLORIDE 600; 310; 30; 20 MG/100ML; MG/100ML; MG/100ML; MG/100ML
INJECTION, SOLUTION INTRAVENOUS ONCE AS NEEDED
Status: ACTIVE | OUTPATIENT
Start: 2019-04-08 | End: 2030-09-04

## 2019-04-08 RX ORDER — LIDOCAINE HYDROCHLORIDE 10 MG/ML
INJECTION, SOLUTION EPIDURAL; INFILTRATION; INTRACAUDAL; PERINEURAL
Status: DISCONTINUED | OUTPATIENT
Start: 2019-04-08 | End: 2019-04-08 | Stop reason: HOSPADM

## 2019-04-08 RX ADMIN — ALPRAZOLAM 1 MG: 1 TABLET ORAL at 11:04

## 2019-04-08 NOTE — OP NOTE
PROCEDURE DATE: 4/8/2019    PROCEDURE: Right L4-5 and L5-S1 transforaminal epidural steroid injection under fluoroscopy    DIAGNOSIS: Lumbar disc displacement without myelopathy  Post op diagnosis: Same    PHYSICIAN: Charles Knowles MD    MEDICATIONS INJECTED:  Dexamethasone 5mg (0.5ml) and 1.5ml 0.25% bupivicaine at each nerve root.     LOCAL ANESTHETIC INJECTED:  Lidocaine 1%. 2 ml per site.    SEDATION MEDICATIONS: RN IV sedation    ESTIMATED BLOOD LOSS:  None    COMPLICATIONS:  None    TECHNIQUE:   A time-out was taken to identify patient and procedure side prior to starting the procedure. The patient was placed in a prone position, prepped and draped in the usual sterile fashion using ChloraPrep and sterile towels.  The area to be injected was determined under fluoroscopic guidance in AP and oblique view.  Local anesthetic was given by raising a wheal and going down to the hub of a 25-gauge 1.5 inch needle.  In oblique view, a 3.5 inch 22-gauge bent-tip spinal needle was introduced towards 6 oclock position of the pedicle of each above named nerve root level.  The needle was walked medially then hinged into the neural foramen and position was confirmed in AP and lateral views.  1ml contrast dye was injected to confirm appropriate placement and that there was no vascular uptake.  After negative aspiration for blood or CSF, the medication was then injected. This was performed at the right L4-5 and L5-S1 level(s). The patient tolerated the procedure well.    The patient was monitored after the procedure.  Patient was given post procedure and discharge instructions to follow at home. The patient was discharged in a stable condition.

## 2019-04-08 NOTE — DISCHARGE INSTRUCTIONS
Before leaving, please make sure you have all your personal belongings such as glasses, purses, wallets, keys, cell phones, jewelry, jackets etc Pain injection instructions:     This procedure may take a couple weeks to relieve pain  You may get some pain relief from the local anesthetic initally.    No driving for 24 hrs.   Activity as tolerated- gradually increase activities.  Dont lift over 10 lbs for 24 hrs   No heat at injection sites x 2 days. No heating pads, hot tubs, saunas, or swimming in any body of water or pool for 2 days.  Use ice pack for mild swelling and for comfort , apply for 20 minutes, remove for 20 minute intervals. No direct contact of ice itself  to skin.  May shower today. No tub baths for two days.      Resume Aspirin, Plavix, or Coumadin the day after the procedure unless otherwise instructed.   If diabetic,monitor your glucose carefully as steroids can increase your glucose level    Seek immediate medical help for:   Severe increase in your usual pain or appearance of new pain.  Prolonged (mor than 8 hours) or increasing weakness or numbness in the legs or arms. Numbing medicine was injected and can affect the messages to and from the brain and legs or arms.  .    Fever above 100.4F ,Drainage,redness,active bleeding, or increased swelling at the injection site.  Headache, shortness of breath, chest pain, or breathing problems.    Recovery After Procedural Sedation (Adult)  You have been given medicine by vein to make you sleep during your surgery. This may have included both a pain medicine and sleeping medicine. Most of the effects have worn off. But you may still have some drowsiness for the next 6 to 8 hours.  Home care  Follow these guidelines when you get home:  · For the next 8 hours, you should be watched by a responsible adult. This person should make sure your condition is not getting worse.  · Don't drink any alcohol for the next 24 hours.  · Don't drive, operate dangerous  machinery, or make important business or personal decisions during the next 24 hours.  Note: Your healthcare provider may tell you not to take any medicine by mouth for pain or sleep in the next 4 hours. These medicines may react with the medicines you were given in the hospital. This could cause a much stronger response than usual.  Follow-up care  Follow up with your healthcare provider if you are not alert and back to your usual level of activity within 12 hours.  When to seek medical advice  Call your healthcare provider right away if any of these occur:  · Drowsiness gets worse  · Weakness or dizziness gets worse  · Repeated vomiting  · You can't be awakened   Date Last Reviewed: 10/18/2016  © 2085-3840 [employer]. [address] [place] [address], [place] [address]. All rights reserved. This information is not intended as a substitute for professional medical care. Always follow your healthcare professional's instructions.

## 2019-04-08 NOTE — PLAN OF CARE
Patient sitting in chair and able to stand to ambulate at bedside without dizziness. She states she is ready to go home Patient denies pain,nausea or weakness. Able to transfer to wheelchair independently. Patient's friend Jossue here and states he is ready to take patient home and that he is driving.

## 2019-04-08 NOTE — DISCHARGE SUMMARY
[facility]  Discharge Note  Short Stay    Admit Date: 4/8/2019    Discharge Date and Time: 4/8/2019    Attending Physician: Charles Knowles MD     Discharge Provider: Charles Knowles    Diagnoses:  Active Hospital Problems    Diagnosis  POA    *Lumbar radiculitis [M54.16]  Yes      Resolved Hospital Problems   No resolved problems to display.       Hospital Course: Lumbar MARILIA  Discharged Condition: Good    Final Diagnoses:   Active Hospital Problems    Diagnosis  POA    *Lumbar radiculitis [M54.16]  Yes      Resolved Hospital Problems   No resolved problems to display.       Disposition: Home or Self Care    Follow up/Patient Instructions:    Medications:  Reconciled Home Medications:      Medication List      CONTINUE taking these medications    aspirin 81 MG EC tablet  Commonly known as:  ECOTRIN  Take 81 mg by mouth once daily.     diclofenac 75 MG EC tablet  Commonly known as:  VOLTAREN  Take 1 tablet (75 mg total) by mouth 2 (two) times daily.     dulaglutide 0.75 mg/0.5 mL Pnij  Commonly known as:  TRULICITY  Inject 0.5 mLs (0.75 mg total) into the skin every 7 days.     HYDROcodone-acetaminophen 5-325 mg per tablet  Commonly known as:  NORCO  Take 1 tablet by mouth every 12 (twelve) hours as needed for Pain.     metFORMIN 1000 MG tablet  Commonly known as:  GLUCOPHAGE  Take 1 tablet (1,000 mg total) by mouth 2 (two) times daily with meals.     pregabalin 200 MG Cap  Commonly known as:  LYRICA  Take 1 capsule (200 mg total) by mouth 2 (two) times daily.     traMADol 50 mg tablet  Commonly known as:  ULTRAM  Take 1 tablet (50 mg total) by mouth every 12 (twelve) hours as needed for Pain.          Discharge Procedure Orders   Call MD for:  temperature >100.4     Call MD for:  persistent nausea and vomiting or diarrhea     Call MD for:  severe uncontrolled pain     Call MD for:  redness, tenderness, or signs of infection (pain, swelling, redness, odor or green/yellow discharge around incision site)     Call MD  for:  difficulty breathing or increased cough     Call MD for:  severe persistent headache        Follow up with MD in 2-3 weeks    Discharge Procedure Orders (must include Diet, Follow-up, Activity):   Discharge Procedure Orders (must include Diet, Follow-up, Activity)   Call MD for:  temperature >100.4     Call MD for:  persistent nausea and vomiting or diarrhea     Call MD for:  severe uncontrolled pain     Call MD for:  redness, tenderness, or signs of infection (pain, swelling, redness, odor or green/yellow discharge around incision site)     Call MD for:  difficulty breathing or increased cough     Call MD for:  severe persistent headache

## 2019-04-09 VITALS
SYSTOLIC BLOOD PRESSURE: 141 MMHG | BODY MASS INDEX: 26.68 KG/M2 | DIASTOLIC BLOOD PRESSURE: 92 MMHG | RESPIRATION RATE: 18 BRPM | TEMPERATURE: 98 F | OXYGEN SATURATION: 95 % | HEIGHT: 62 IN | WEIGHT: 145 LBS | HEART RATE: 81 BPM

## 2019-04-17 ENCOUNTER — DOCUMENTATION ONLY (OUTPATIENT)
Dept: FAMILY MEDICINE | Facility: CLINIC | Age: 59
End: 2019-04-17

## 2019-04-17 NOTE — PROGRESS NOTES
Pre-Visit Chart Review  For Appointment Scheduled on 04/24/2019    Health Maintenance Due   Topic Date Due    TETANUS VACCINE  07/13/1979    Pap Smear with HPV Cotest  07/13/1982    Colonoscopy  07/13/2011    Eye Exam  04/16/2019

## 2019-04-18 DIAGNOSIS — E11.9 TYPE 2 DIABETES MELLITUS WITHOUT COMPLICATION, UNSPECIFIED WHETHER LONG TERM INSULIN USE: ICD-10-CM

## 2019-04-18 DIAGNOSIS — Z12.11 COLON CANCER SCREENING: ICD-10-CM

## 2019-04-24 ENCOUNTER — OFFICE VISIT (OUTPATIENT)
Dept: FAMILY MEDICINE | Facility: CLINIC | Age: 59
End: 2019-04-24
Payer: MEDICARE

## 2019-04-24 ENCOUNTER — DOCUMENTATION ONLY (OUTPATIENT)
Dept: TRANSPLANT | Facility: CLINIC | Age: 59
End: 2019-04-24

## 2019-04-24 ENCOUNTER — DOCUMENTATION ONLY (OUTPATIENT)
Dept: FAMILY MEDICINE | Facility: CLINIC | Age: 59
End: 2019-04-24

## 2019-04-24 ENCOUNTER — CLINICAL SUPPORT (OUTPATIENT)
Dept: FAMILY MEDICINE | Facility: CLINIC | Age: 59
End: 2019-04-24
Attending: PHYSICIAN ASSISTANT
Payer: MEDICARE

## 2019-04-24 ENCOUNTER — TELEPHONE (OUTPATIENT)
Dept: HEPATOLOGY | Facility: CLINIC | Age: 59
End: 2019-04-24

## 2019-04-24 ENCOUNTER — PATIENT MESSAGE (OUTPATIENT)
Dept: PAIN MEDICINE | Facility: CLINIC | Age: 59
End: 2019-04-24

## 2019-04-24 VITALS
BODY MASS INDEX: 26.74 KG/M2 | TEMPERATURE: 98 F | HEIGHT: 62 IN | HEART RATE: 90 BPM | DIASTOLIC BLOOD PRESSURE: 106 MMHG | WEIGHT: 145.31 LBS | SYSTOLIC BLOOD PRESSURE: 149 MMHG

## 2019-04-24 DIAGNOSIS — E11.8 TYPE 2 DIABETES MELLITUS WITH COMPLICATION, WITHOUT LONG-TERM CURRENT USE OF INSULIN: ICD-10-CM

## 2019-04-24 DIAGNOSIS — E11.59 HYPERTENSION ASSOCIATED WITH DIABETES: ICD-10-CM

## 2019-04-24 DIAGNOSIS — M54.16 LUMBAR RADICULITIS: Primary | ICD-10-CM

## 2019-04-24 DIAGNOSIS — I15.2 HYPERTENSION ASSOCIATED WITH DIABETES: ICD-10-CM

## 2019-04-24 DIAGNOSIS — Z12.4 SCREENING FOR CERVICAL CANCER: ICD-10-CM

## 2019-04-24 DIAGNOSIS — E11.8 TYPE 2 DIABETES MELLITUS WITH COMPLICATION, WITHOUT LONG-TERM CURRENT USE OF INSULIN: Primary | ICD-10-CM

## 2019-04-24 DIAGNOSIS — Z12.11 SCREEN FOR COLON CANCER: ICD-10-CM

## 2019-04-24 DIAGNOSIS — B18.2 HEP C W/O COMA, CHRONIC: ICD-10-CM

## 2019-04-24 PROCEDURE — 87624 HPV HI-RISK TYP POOLED RSLT: CPT

## 2019-04-24 PROCEDURE — 92250 FUNDUS PHOTOGRAPHY W/I&R: CPT | Mod: PBBFAC,PO

## 2019-04-24 PROCEDURE — 92250 FUNDUS PHOTOGRAPHY W/I&R: CPT | Mod: 26,S$PBB,, | Performed by: OPHTHALMOLOGY

## 2019-04-24 PROCEDURE — 99214 OFFICE O/P EST MOD 30 MIN: CPT | Mod: S$PBB,,, | Performed by: PHYSICIAN ASSISTANT

## 2019-04-24 PROCEDURE — 99214 OFFICE O/P EST MOD 30 MIN: CPT | Mod: PBBFAC,PO | Performed by: PHYSICIAN ASSISTANT

## 2019-04-24 PROCEDURE — 99999 PR PBB SHADOW E&M-EST. PATIENT-LVL IV: CPT | Mod: PBBFAC,,, | Performed by: PHYSICIAN ASSISTANT

## 2019-04-24 PROCEDURE — 92250 DIABETIC EYE SCREENING PHOTO: ICD-10-PCS | Mod: 26,S$PBB,, | Performed by: OPHTHALMOLOGY

## 2019-04-24 PROCEDURE — 99999 PR PBB SHADOW E&M-EST. PATIENT-LVL IV: ICD-10-PCS | Mod: PBBFAC,,, | Performed by: PHYSICIAN ASSISTANT

## 2019-04-24 PROCEDURE — 99214 PR OFFICE/OUTPT VISIT, EST, LEVL IV, 30-39 MIN: ICD-10-PCS | Mod: S$PBB,,, | Performed by: PHYSICIAN ASSISTANT

## 2019-04-24 PROCEDURE — 88175 CYTOPATH C/V AUTO FLUID REDO: CPT

## 2019-04-24 RX ORDER — LOSARTAN POTASSIUM 50 MG/1
50 TABLET ORAL DAILY
Qty: 90 TABLET | Refills: 3 | Status: SHIPPED | OUTPATIENT
Start: 2019-04-24 | End: 2020-05-05 | Stop reason: SDUPTHER

## 2019-04-24 NOTE — PROGRESS NOTES
Mich Knight is a 58 y.o. female here for a diabetic eye screening with non-dilated fundus photos per     Patient cooperative Yes  Small pupils No  Last eye exam: 2018    For exam results, see Encounter Report.

## 2019-04-24 NOTE — LETTER
April 24, 2019    Mich Knight  [address] [facility] [facility] [address]      Dear Mich Knight:    Your doctor has referred you to the [facility]. We are sending this letter to remind you to make an appointment with us to complete the referral process.     Please call us at [phone] to schedule an appointment. We look forward to seeing you soon.     If you received a call and have been scheduled, please disregard this letter.       Sincerely,        [facility]   [address] [place] [id number]  [phone]

## 2019-04-24 NOTE — NURSING
Pt records reviewed.   Pt will be referred to Hepatitis C clinic  Hep C w/o coma, chronic  Initial referral received  from the workque.   Referring Provider/diagnosis  LENORA Kwan    Referral letter sent to  patient.

## 2019-04-24 NOTE — LETTER
April 24, 2019    LENORA Kwan  [address] [facility] [address]      Dear Dr. Bonilla    Patient: Mich Knight   MR Number: [id number]   YOB: 1960     Thank you for the referral of Mich Knight to the [facility] program. An initial appointment will be scheduled for your patient with one of our Hepatologists.      Thank you again for your trust in our program.  If there is anything we can do for you or your staff, please feel free to contact us.        Sincerely,        [facility]  [address] [place] [id number]  [phone]

## 2019-04-24 NOTE — PROGRESS NOTES
Subjective:       Patient ID: Mich Knight is a 58 y.o. female.    Chief Complaint: Gynecologic Exam    Patient with type 2 diabetes mellitus, chronic hepatitis C which has been untreated, and chronic pain secondary to lumbar disc disease presents for routine follow-up and cervical cancer screening.  Patient has never had abnormal Pap smear.  She is up-to-date with her routine mammogram.  Patient is due for colon cancer screening.  Chart review showed history of chronic hepatitis C.  Patient was referred to hepatology 1 year ago but the appointment was canceled due to transportation issues.  Patient has never had treatment for hepatitis C.  Patient has no new complaints today.  She is currently seeing Pain Management for her chronic back pain  Patients patient medical/surgical, social and family histories have been reviewed       Review of Systems   Constitutional: Positive for activity change. Negative for unexpected weight change.   HENT: Negative for hearing loss, rhinorrhea and trouble swallowing.    Eyes: Negative for discharge and visual disturbance.   Respiratory: Negative for chest tightness and wheezing.    Cardiovascular: Negative for chest pain and palpitations.   Gastrointestinal: Negative for blood in stool, constipation, diarrhea and vomiting.   Endocrine: Negative for polydipsia and polyuria.   Genitourinary: Negative for difficulty urinating, dysuria, hematuria and menstrual problem.   Musculoskeletal: Positive for arthralgias. Negative for joint swelling and neck pain.   Neurological: Positive for weakness. Negative for headaches.   Psychiatric/Behavioral: Negative for confusion and dysphoric mood.       Objective:      Physical Exam   Constitutional: She appears well-developed and well-nourished. She is cooperative. No distress.   HENT:   Head: Normocephalic and atraumatic.   Cardiovascular: Normal rate and regular rhythm.   Pulmonary/Chest: Effort normal and breath sounds normal.   Abdominal:  Soft. Bowel sounds are normal. She exhibits no distension. There is no tenderness.   Genitourinary: Vagina normal and uterus normal. Pelvic exam was performed with patient supine. There is no lesion on the right labia. There is no lesion on the left labia. Cervix exhibits no motion tenderness, no discharge and no friability. Right adnexum displays no mass, no tenderness and no fullness. Left adnexum displays no mass, no tenderness and no fullness.   Neurological: She is alert.   Skin: Skin is warm and dry. Capillary refill takes less than 2 seconds.       Assessment:       1. Type 2 diabetes mellitus with complication, without long-term current use of insulin    2. Hep C w/o coma, chronic    3. Hypertension associated with diabetes    4. Screening for cervical cancer    5. Screen for colon cancer        Plan:       Mich was seen today for gynecologic exam.    Diagnoses and all orders for this visit:    Type 2 diabetes mellitus with complication, without long-term current use of insulin  -     Diabetic Eye Screening Photo; Future    Hep C w/o coma, chronic       Ambulatory Referral to Hepatology    Hypertension associated with diabetes  Start   losartan (COZAAR) 50 MG tablet; Take 1 tablet (50 mg total) by mouth once daily.        Screening for cervical cancer  -     Liquid-based pap smear, screening  -     HPV High Risk Genotypes, PCR    Screen for colon cancer  -     Fecal Immunochemical Test (iFOBT); Future    Other orders  - -   Mich was seen today for gynecologic exam.

## 2019-04-24 NOTE — TELEPHONE ENCOUNTER
----- Message from Kimberly Moreno sent at 4/24/2019 11:48 AM CDT -----      ----- Message -----  From: Jennifer Amado LPN  Sent: 4/24/2019  11:09 AM  To: Hepatology Scheduling    Pt records reviewed.   Pt will be referred to Hepatitis C clinic  Hep C w/o coma, chronic  Initial referral received  from the workque.   Referring Provider/diagnosis  LENORA Kwan

## 2019-04-26 ENCOUNTER — TELEPHONE (OUTPATIENT)
Dept: FAMILY MEDICINE | Facility: CLINIC | Age: 59
End: 2019-04-26

## 2019-04-26 NOTE — TELEPHONE ENCOUNTER
----- Message from Marion Bonilla sent at 4/26/2019 11:26 AM CDT -----  Contact: self  Type:  Patient Returning Call    Who Called:  self  Who Left Message for Patient:  Not sure  Does the patient know what this is regarding?:  no  Best Call Back Number:  [phone]  Additional Information:  Patient states she has been called twice but does not know why. Please call patient. Thanks!

## 2019-04-29 ENCOUNTER — TELEPHONE (OUTPATIENT)
Dept: HEPATOLOGY | Facility: CLINIC | Age: 59
End: 2019-04-29

## 2019-04-29 ENCOUNTER — OFFICE VISIT (OUTPATIENT)
Dept: ORTHOPEDICS | Facility: CLINIC | Age: 59
End: 2019-04-29
Payer: MEDICARE

## 2019-04-29 VITALS
DIASTOLIC BLOOD PRESSURE: 80 MMHG | HEART RATE: 81 BPM | SYSTOLIC BLOOD PRESSURE: 127 MMHG | HEIGHT: 62 IN | BODY MASS INDEX: 26.31 KG/M2 | WEIGHT: 143 LBS

## 2019-04-29 DIAGNOSIS — M48.062 LUMBAR STENOSIS WITH NEUROGENIC CLAUDICATION: ICD-10-CM

## 2019-04-29 DIAGNOSIS — M51.36 DDD (DEGENERATIVE DISC DISEASE), LUMBAR: Primary | ICD-10-CM

## 2019-04-29 DIAGNOSIS — M47.814 SPONDYLOSIS OF THORACIC REGION WITHOUT MYELOPATHY OR RADICULOPATHY: ICD-10-CM

## 2019-04-29 PROCEDURE — 72070 X-RAY EXAM THORAC SPINE 2VWS: CPT | Mod: ,,, | Performed by: ORTHOPAEDIC SURGERY

## 2019-04-29 PROCEDURE — 72070 PR  X-RAY THORACIC SPINE 2 VW: ICD-10-PCS | Mod: ,,, | Performed by: ORTHOPAEDIC SURGERY

## 2019-04-29 PROCEDURE — 99203 PR OFFICE/OUTPT VISIT, NEW, LEVL III, 30-44 MIN: ICD-10-PCS | Mod: ,,, | Performed by: ORTHOPAEDIC SURGERY

## 2019-04-29 PROCEDURE — 99203 OFFICE O/P NEW LOW 30 MIN: CPT | Mod: ,,, | Performed by: ORTHOPAEDIC SURGERY

## 2019-04-30 ENCOUNTER — PATIENT MESSAGE (OUTPATIENT)
Dept: PAIN MEDICINE | Facility: CLINIC | Age: 59
End: 2019-04-30

## 2019-04-30 DIAGNOSIS — M47.816 LUMBAR SPONDYLOSIS: Primary | ICD-10-CM

## 2019-04-30 DIAGNOSIS — Z91.81 AT MODERATE RISK FOR FALL: ICD-10-CM

## 2019-05-01 LAB
HPV HR 12 DNA CVX QL NAA+PROBE: NEGATIVE
HPV16 AG SPEC QL: NEGATIVE
HPV18 DNA SPEC QL NAA+PROBE: NEGATIVE

## 2019-05-01 RX ORDER — DICLOFENAC SODIUM 75 MG/1
75 TABLET, DELAYED RELEASE ORAL 2 TIMES DAILY
Qty: 60 TABLET | Refills: 2 | Status: SHIPPED | OUTPATIENT
Start: 2019-05-01 | End: 2019-10-01

## 2019-05-07 ENCOUNTER — TELEPHONE (OUTPATIENT)
Dept: ORTHOPEDICS | Facility: CLINIC | Age: 59
End: 2019-05-07

## 2019-05-07 NOTE — TELEPHONE ENCOUNTER
----- Message from Brii Birch sent at 5/7/2019 11:04 AM CDT -----  Contact: [facility] office  Please fax pt's medical records to [phone] or [phone]. Phone # [phone]

## 2019-05-08 ENCOUNTER — PATIENT MESSAGE (OUTPATIENT)
Dept: PAIN MEDICINE | Facility: CLINIC | Age: 59
End: 2019-05-08

## 2019-05-08 ENCOUNTER — HOSPITAL ENCOUNTER (EMERGENCY)
Facility: HOSPITAL | Age: 59
Discharge: HOME | End: 2019-05-08
Attending: EMERGENCY MEDICINE
Payer: MEDICARE

## 2019-05-08 VITALS
OXYGEN SATURATION: 98 % | BODY MASS INDEX: 25.61 KG/M2 | WEIGHT: 140 LBS | RESPIRATION RATE: 16 BRPM | DIASTOLIC BLOOD PRESSURE: 84 MMHG | HEART RATE: 82 BPM | SYSTOLIC BLOOD PRESSURE: 131 MMHG | TEMPERATURE: 98 F

## 2019-05-08 DIAGNOSIS — M54.31 RIGHT SIDED SCIATICA: Primary | ICD-10-CM

## 2019-05-08 PROCEDURE — 63600175 PHARM REV CODE 636 W HCPCS: Performed by: EMERGENCY MEDICINE

## 2019-05-08 PROCEDURE — 99284 EMERGENCY DEPT VISIT MOD MDM: CPT | Mod: 25

## 2019-05-08 PROCEDURE — 96372 THER/PROPH/DIAG INJ SC/IM: CPT

## 2019-05-08 RX ORDER — KETOROLAC TROMETHAMINE 30 MG/ML
10 INJECTION, SOLUTION INTRAMUSCULAR; INTRAVENOUS
Status: COMPLETED | OUTPATIENT
Start: 2019-05-08 | End: 2019-05-08

## 2019-05-08 RX ORDER — ORPHENADRINE CITRATE 30 MG/ML
60 INJECTION INTRAMUSCULAR; INTRAVENOUS
Status: COMPLETED | OUTPATIENT
Start: 2019-05-08 | End: 2019-05-08

## 2019-05-08 RX ADMIN — KETOROLAC TROMETHAMINE 10 MG: 30 INJECTION, SOLUTION INTRAMUSCULAR at 07:05

## 2019-05-08 RX ADMIN — ORPHENADRINE CITRATE 60 MG: 30 INJECTION INTRAMUSCULAR; INTRAVENOUS at 07:05

## 2019-05-10 ENCOUNTER — PATIENT MESSAGE (OUTPATIENT)
Dept: ADMINISTRATIVE | Facility: HOSPITAL | Age: 59
End: 2019-05-10

## 2019-05-10 ENCOUNTER — PATIENT MESSAGE (OUTPATIENT)
Dept: PAIN MEDICINE | Facility: CLINIC | Age: 59
End: 2019-05-10

## 2019-05-10 ENCOUNTER — PATIENT MESSAGE (OUTPATIENT)
Dept: FAMILY MEDICINE | Facility: CLINIC | Age: 59
End: 2019-05-10

## 2019-05-22 ENCOUNTER — CLINICAL SUPPORT (OUTPATIENT)
Dept: FAMILY MEDICINE | Facility: CLINIC | Age: 59
End: 2019-05-22
Payer: MEDICARE

## 2019-05-22 VITALS — HEART RATE: 92 BPM | DIASTOLIC BLOOD PRESSURE: 78 MMHG | SYSTOLIC BLOOD PRESSURE: 126 MMHG

## 2019-05-22 DIAGNOSIS — E11.59 HYPERTENSION ASSOCIATED WITH DIABETES: Primary | ICD-10-CM

## 2019-05-22 DIAGNOSIS — I15.2 HYPERTENSION ASSOCIATED WITH DIABETES: Primary | ICD-10-CM

## 2019-05-22 PROCEDURE — 99999 PR PBB SHADOW E&M-EST. PATIENT-LVL II: CPT | Mod: PBBFAC,,,

## 2019-05-22 PROCEDURE — 99999 PR PBB SHADOW E&M-EST. PATIENT-LVL II: ICD-10-PCS | Mod: PBBFAC,,,

## 2019-05-22 PROCEDURE — 99212 OFFICE O/P EST SF 10 MIN: CPT | Mod: PBBFAC,PO

## 2019-05-22 NOTE — PROGRESS NOTES
Patient came in for a nurse BP check. Patient's BP was 126/78 manually. Advised patient to continue current medication and f/u as scheduled.

## 2019-05-30 RX ORDER — TRAMADOL HYDROCHLORIDE 50 MG/1
50 TABLET ORAL EVERY 12 HOURS PRN
Qty: 60 TABLET | Refills: 2 | OUTPATIENT
Start: 2019-05-30 | End: 2020-05-29

## 2019-05-31 ENCOUNTER — HOSPITAL ENCOUNTER (EMERGENCY)
Facility: HOSPITAL | Age: 59
Discharge: HOME | End: 2019-05-31
Attending: EMERGENCY MEDICINE
Payer: MEDICARE

## 2019-05-31 VITALS
HEIGHT: 62 IN | HEART RATE: 84 BPM | TEMPERATURE: 98 F | SYSTOLIC BLOOD PRESSURE: 165 MMHG | RESPIRATION RATE: 12 BRPM | WEIGHT: 140 LBS | OXYGEN SATURATION: 99 % | BODY MASS INDEX: 25.76 KG/M2 | DIASTOLIC BLOOD PRESSURE: 84 MMHG

## 2019-05-31 DIAGNOSIS — M54.16 LUMBAR RADICULOPATHY, ACUTE: Primary | ICD-10-CM

## 2019-05-31 DIAGNOSIS — R52 PAIN: ICD-10-CM

## 2019-05-31 DIAGNOSIS — M79.606 LEG PAIN: ICD-10-CM

## 2019-05-31 PROCEDURE — 25000003 PHARM REV CODE 250: Performed by: NURSE PRACTITIONER

## 2019-05-31 PROCEDURE — 99284 EMERGENCY DEPT VISIT MOD MDM: CPT | Mod: 25

## 2019-05-31 RX ORDER — TRAMADOL HYDROCHLORIDE 50 MG/1
50 TABLET ORAL
Status: COMPLETED | OUTPATIENT
Start: 2019-05-31 | End: 2019-05-31

## 2019-05-31 RX ORDER — HYDROCODONE BITARTRATE AND ACETAMINOPHEN 5; 325 MG/1; MG/1
1 TABLET ORAL EVERY 6 HOURS PRN
COMMUNITY
End: 2019-06-17

## 2019-05-31 RX ADMIN — TRAMADOL HYDROCHLORIDE 50 MG: 50 TABLET ORAL at 09:05

## 2019-05-31 NOTE — ED PROVIDER NOTES
Encounter Date: 5/31/2019       History     Chief Complaint   Patient presents with    Leg Pain     Pain, numbness R leg since February.     Patient is a 58 y.o. female who presents to the ED 05/31/2019 with a chief complaint of right lower extremity pain since February of this year.  Patient states she had an injection in her back for chronic lower back pain and since then has been having pain in her right leg.  She states she is under the care of a new pain management doctor and had an injection of few days ago in her lower back and her back pain has since subsided.  However she continues to have pain down entire right leg.  She has intermittent weakness. She did fall yesterday afternoon.  She is concerned she heard something in her right leg.  However she states her pain is the same since the fall.  She is currently out of her pain medications and plans to follow up with her pain management doctor on Tuesday.  She denies any fever.  She is not currently taking any hormones.  She denies any recent surgeries or long travel.  She denies any history of blood clots.  She denies loss of bowel bladder control.             Review of patient's allergies indicates:  No Known Allergies  Past Medical History:   Diagnosis Date    DDD (degenerative disc disease), lumbar     Diabetes mellitus     Essential tremor     Hepatitis C     HLD (hyperlipidemia)     HTN (hypertension)      Past Surgical History:   Procedure Laterality Date    BLOCK- BRANCH- SACROILIAC Right 2/9/2018    [facility] [place]    Block-nerve-medial branch-lumbar Bilateral 7/10/2018    Performed by [facility] at [facility] [place]    c-sec  1990    CHOLECYSTECTOMY      Injection,steroid,epidural,transforaminal approach L4-5, L5-S1 Right 4/8/2019    Per[facility] by [facility] [place]    INJECTION-STEROID-EPIDURAL-LUMBAR Right 3/27/2018    [facility] [place]    INJECTION-STEROID-EPIDURAL-TRANSFORAMINAL Bilateral  5/21/2018    [facility] at [facility]    RADIOFREQUENCY ABLATION, NERVE, MEDIAL BRANCH, LUMBAR, 1 LEVEL Bilateral 7/27/2018    [facility] at [facility] [place]    Radiofrequency Ablation, Nerve, Spinal, Lumbar, Medial Branch, L2,3,4,5 Bilateral 2/7/2019    Per[facility] by [facility] at [facility] [place]     Family History   Problem Relation Age of Onset    Heart disease Mother     Macular degeneration Mother     Diabetes type II Father     Dementia Father     Heart disease Father      Social History     Tobacco Use    Smoking status: Current Every Day Smoker     Packs/day: 0.50     Years: 40.00     Pack years: 20.00    Smokeless tobacco: Never Used   Substance Use Topics    Alcohol use: Yes     Alcohol/week: 0.0 - 3.0 oz     Frequency: 2-4 times a month     Drinks per session: 5 or 6    Drug use: No     Review of Systems   Constitutional: Negative for chills and fever.   HENT: Negative for sore throat.    Respiratory: Negative for chest tightness and shortness of breath.    Cardiovascular: Negative for chest pain.   Gastrointestinal: Negative for abdominal pain.   Genitourinary: Negative for dysuria.   Musculoskeletal: Positive for arthralgias and myalgias.   Skin: Negative for rash and wound.   Neurological: Positive for weakness. Negative for syncope and numbness.   Hematological: Negative for adenopathy.       Physical Exam     Initial Vitals [05/31/19 0858]   BP Pulse Resp Temp SpO2   (!) 183/93 88 12 98.3 °F (36.8 °C) 99 %      MAP       --         Physical Exam    Nursing note and vitals reviewed.  Constitutional: Vital signs are normal. She appears well-developed and well-nourished.   HENT:   Head: Normocephalic and atraumatic.   Eyes: Pupils are equal, round, and reactive to light.   Neck: Neck supple.   Cardiovascular: Normal rate, regular rhythm, normal heart sounds and intact distal pulses. Exam reveals no gallop and no friction rub.    No murmur heard.  Pulmonary/Chest: Breath  sounds normal. She has no wheezes. She has no rhonchi. She has no rales.   Abdominal: Normal appearance.   Musculoskeletal:        Right hip: Normal.        Right knee: Normal.        Right ankle: Normal.        Right upper leg: She exhibits tenderness and bony tenderness. She exhibits no swelling, no edema, no deformity and no laceration.        Right lower leg: She exhibits tenderness and bony tenderness. She exhibits no swelling, no edema, no deformity and no laceration.        Right foot: Normal.   Neurological: She is alert and oriented to person, place, and time. She has normal strength.   Skin: Skin is warm, dry and intact. Capillary refill takes less than 2 seconds.   Psychiatric: She has a normal mood and affect. Her speech is normal and behavior is normal.         ED Course   Procedures  Labs Reviewed - No data to display       Imaging Results          X-Ray Tibia Fibula 2 View Right (Final result)  Result time 05/31/19 10:32:12    Final result by Allyn Jones MD (05/31/19 10:32:12)                 Impression:      As above      Electronically signed by: Allyn Jones MD  Date:    05/31/2019  Time:    10:32             Narrative:    EXAMINATION:  XR TIBIA FIBULA 2 VIEW RIGHT    CLINICAL HISTORY:  Pain, unspecified    TECHNIQUE:  AP and lateral views of the right tibia and fibula were performed.    COMPARISON:  None.    FINDINGS:  Acute fracture or dislocation.                               X-Ray Femur Ap/Lat Right (Final result)  Result time 05/31/19 10:32:29    Final result by Allyn Jones MD (05/31/19 10:32:29)                 Impression:      As above      Electronically signed by: Allyn Jones MD  Date:    05/31/2019  Time:    10:32             Narrative:    EXAMINATION:  XR FEMUR 2 VIEW RIGHT    CLINICAL HISTORY:  Pain, unspecified    TECHNIQUE:  AP and lateral views of the right femur were performed.    COMPARISON:  None    FINDINGS:  No acute fracture or dislocation                                US Lower Extremity Veins Right (Final result)  Result time 05/31/19 10:02:24    Final result by Valdemar Rosen Jr., MD (05/31/19 10:02:24)                 Impression:      No evidence of deep venous thrombosis in the right lower extremity.      Electronically signed by: Valdemar Rosen MD  Date:    05/31/2019  Time:    10:02             Narrative:    EXAMINATION:  US LOWER EXTREMITY VEINS RIGHT    CLINICAL HISTORY:  Pain in leg, unspecified    TECHNIQUE:  Duplex and color flow Doppler evaluation and graded compression of the right lower extremity veins was performed.    COMPARISON:  None    FINDINGS:  Right thigh veins: The common femoral, femoral, popliteal, upper greater saphenous, and deep femoral veins are patent and free of thrombus. The veins are normally compressible and have normal phasic flow and augmentation response.    Right calf veins: The visualized calf veins are patent.    Contralateral CFV: The contralateral (left) common femoral vein is patent and free of thrombus.    Miscellaneous: None                                 Medical Decision Making:   Differential Diagnosis:   Lumbar radiculopathy  DVT  PAD       APC / Resident Notes:   Patient is a 58 y.o. female who presents to the ED 05/31/2019 who underwent emergent evaluation for right lower extremity pain that has been present since February.  Patient states she did have a fall last evening.  She has no pinpoint pain and states her pain is in her entire right lower extremity.  Mostly over her right calf and tibia fibula as well as her right thigh.  Right knee with normal range of motion without swelling.  The right lower extremity is without deformity or swelling.  She has +2 pedal pulse to the right lower extremity with no signs of neurovascular compromise.  There is no swelling to the right lower extremity.  She has +2 right lower extremity distal pulses. I do not think compartment syndrome or PAD.  An ultrasound of the right  lower extremity is performed to rule out DVT which is negative. An x-ray of patient's right tibia fibula as well as right femur performed without acute findings.  I doubt acute fracture.  Patient is able to bear weight and ambulate in the emergency department.  She has normal range of motion of all her joints. There are no signs of infection.  Skin is warm and without erythema rash present.  Patient is on lumbar radiculopathy currently being treated by her pain management physician.  I believe this is the etiology of patient's pain is she is instructed to follow up with her pain management physician as directed this week. Based on my clinical evaluation, I do not appreciate any immediate, emergent, or life threatening condition or etiology that warrants additional workup today and feel that the patient can be discharged with close follow up care. Case discussed with Dr. Mars who is agreeable to plan of care. Follow up and return precautions discussed; patient verbalized understanding and is agreeable to plan of care. Patient discharged home in stable condition.               Attending Attestation:           Physician Attestation for Scribe:  Physician Attestation Statement for Scribe #1: I, Gisele Rodriguez, reviewed documentation, as scribed by in my presence, and it is both accurate and complete.     Comments: I, ALLISON Florez, personally performed the services described in this documentation. All medical record entries made by the scribe were at my direction and in my presence.  I have reviewed the chart and agree that the record reflects my personal performance and is accurate and complete. ALLISON Florez.  2:35 PM 05/31/2019 e               Clinical Impression:       ICD-10-CM ICD-9-CM   1. Lumbar radiculopathy, acute M54.16 724.4   2. Leg pain M79.606 729.5   3. Pain R52 780.96         Disposition:   Disposition: Discharged  Condition: Stable                        Gisele Rodriguez NP  05/31/19 8969

## 2019-06-07 ENCOUNTER — PATIENT MESSAGE (OUTPATIENT)
Dept: FAMILY MEDICINE | Facility: CLINIC | Age: 59
End: 2019-06-07

## 2019-06-07 DIAGNOSIS — M51.36 DDD (DEGENERATIVE DISC DISEASE), LUMBAR: Primary | ICD-10-CM

## 2019-06-08 ENCOUNTER — PATIENT MESSAGE (OUTPATIENT)
Dept: FAMILY MEDICINE | Facility: CLINIC | Age: 59
End: 2019-06-08

## 2019-06-08 DIAGNOSIS — M54.16 LUMBAR RADICULITIS: Primary | ICD-10-CM

## 2019-06-10 ENCOUNTER — PATIENT OUTREACH (OUTPATIENT)
Dept: ADMINISTRATIVE | Facility: HOSPITAL | Age: 59
End: 2019-06-10

## 2019-06-10 NOTE — TELEPHONE ENCOUNTER
Patient wants a cane.  Patient is not going to see Dr Knowles, she is looking for someone else  Please read message and advise.

## 2019-06-10 NOTE — TELEPHONE ENCOUNTER
Faxed to [facility]. Informed patient regarding this via portal.

## 2019-06-17 ENCOUNTER — OFFICE VISIT (OUTPATIENT)
Dept: FAMILY MEDICINE | Facility: CLINIC | Age: 59
End: 2019-06-17
Payer: MEDICARE

## 2019-06-17 VITALS
SYSTOLIC BLOOD PRESSURE: 138 MMHG | TEMPERATURE: 98 F | WEIGHT: 147.69 LBS | HEART RATE: 97 BPM | BODY MASS INDEX: 27.18 KG/M2 | OXYGEN SATURATION: 98 % | DIASTOLIC BLOOD PRESSURE: 84 MMHG | HEIGHT: 62 IN

## 2019-06-17 DIAGNOSIS — R23.9 SKIN CHANGE: ICD-10-CM

## 2019-06-17 DIAGNOSIS — M54.16 LUMBAR RADICULITIS: Primary | ICD-10-CM

## 2019-06-17 DIAGNOSIS — M51.36 DDD (DEGENERATIVE DISC DISEASE), LUMBAR: ICD-10-CM

## 2019-06-17 DIAGNOSIS — G89.4 CHRONIC PAIN SYNDROME: ICD-10-CM

## 2019-06-17 PROCEDURE — 99999 PR PBB SHADOW E&M-EST. PATIENT-LVL IV: CPT | Mod: PBBFAC,,, | Performed by: NURSE PRACTITIONER

## 2019-06-17 PROCEDURE — 99214 OFFICE O/P EST MOD 30 MIN: CPT | Mod: S$GLB,,, | Performed by: NURSE PRACTITIONER

## 2019-06-17 PROCEDURE — 99214 PR OFFICE/OUTPT VISIT, EST, LEVL IV, 30-39 MIN: ICD-10-PCS | Mod: S$GLB,,, | Performed by: NURSE PRACTITIONER

## 2019-06-17 PROCEDURE — 99999 PR PBB SHADOW E&M-EST. PATIENT-LVL IV: ICD-10-PCS | Mod: PBBFAC,,, | Performed by: NURSE PRACTITIONER

## 2019-06-17 PROCEDURE — 99214 OFFICE O/P EST MOD 30 MIN: CPT | Mod: PBBFAC,PO | Performed by: NURSE PRACTITIONER

## 2019-06-17 NOTE — PROGRESS NOTES
"Subjective:       Patient ID: Mich Knight is a 58 y.o. female.    Chief Complaint: bump (on back)    Ms. Knight presents today with complaints of lower back pain/ right leg pain and concerns with a "bump" on her back. Per chart review, long history of back pain. Has seen Dr. Knowles in the past, but is no longer seeing him. Had side effects after she received an injection in her back. She is currently not under the care of pain management. Requesting to discuss pain medication today, ultram. Recently discussed with Dr. Nicholson (PCP) via portal and was provided with a referral for pain management. Also requested a cane for ambulation. Has a lump on her back that has been present since Sept.- Oct. 2018. She has tried to remove the contents, but has had little luck. She is a current smoker.     Patient Active Problem List   Diagnosis    Hypertension associated with diabetes    HLD (hyperlipidemia)    Diabetes mellitus    Essential tremor    DDD (degenerative disc disease), lumbar    Tobacco abuse    Chronic pain syndrome    Chronic SI joint pain    Hep C w/o coma, chronic    Lumbar radiculitis    Other spondylosis, lumbar region     Review of Systems   Constitutional: Positive for activity change. Negative for unexpected weight change.   HENT: Negative for hearing loss, rhinorrhea and trouble swallowing.    Eyes: Positive for visual disturbance. Negative for discharge.   Respiratory: Negative for chest tightness and wheezing.    Cardiovascular: Negative for chest pain and palpitations.   Gastrointestinal: Negative for blood in stool, constipation, diarrhea and vomiting.   Endocrine: Negative for polydipsia and polyuria.   Genitourinary: Negative for difficulty urinating, dysuria, hematuria and menstrual problem.   Musculoskeletal: Positive for arthralgias. Negative for joint swelling and neck pain.   Skin: Positive for wound.   Neurological: Negative for weakness and headaches.   Psychiatric/Behavioral: " Negative for confusion and dysphoric mood.       Objective:      Physical Exam   Constitutional: She is oriented to person, place, and time. She appears well-developed and well-nourished.   Cardiovascular: Normal rate, regular rhythm and normal heart sounds.   Pulmonary/Chest: Effort normal and breath sounds normal.   Musculoskeletal:        Lumbar back: She exhibits decreased range of motion and pain.        Right upper leg: She exhibits no tenderness, no swelling and no edema.   Limping gait   Neurological: She is alert and oriented to person, place, and time.   Skin: Skin is warm and dry.        Psychiatric: She has a normal mood and affect.   Nursing note and vitals reviewed.      Assessment:       1. Lumbar radiculitis    2. DDD (degenerative disc disease), lumbar    3. Chronic pain syndrome    4. Skin change        Plan:       Mich was seen today for bump.    Diagnoses and all orders for this visit:    Lumbar radiculitis  -     Ambulatory referral to Pain Clinic  Long history of back pain, provided with external referral for pain management  Patient declines PT today  Continue diclofenac and lyrica as prescribed    DDD (degenerative disc disease), lumbar  -     Ambulatory referral to Pain Clinic    Chronic pain syndrome  -     Ambulatory referral to Pain Clinic    Skin change  -     Ambulatory referral to Dermatology

## 2019-06-18 ENCOUNTER — LAB VISIT (OUTPATIENT)
Dept: LAB | Facility: HOSPITAL | Age: 59
End: 2019-06-18
Attending: PHYSICIAN ASSISTANT
Payer: MEDICARE

## 2019-06-18 ENCOUNTER — INITIAL CONSULT (OUTPATIENT)
Dept: HEPATOLOGY | Facility: CLINIC | Age: 59
End: 2019-06-18
Payer: MEDICARE

## 2019-06-18 VITALS
DIASTOLIC BLOOD PRESSURE: 85 MMHG | SYSTOLIC BLOOD PRESSURE: 146 MMHG | BODY MASS INDEX: 26.9 KG/M2 | HEART RATE: 109 BPM | WEIGHT: 146.19 LBS | HEIGHT: 62 IN

## 2019-06-18 DIAGNOSIS — B18.2 CHRONIC HEPATITIS C WITHOUT HEPATIC COMA: ICD-10-CM

## 2019-06-18 DIAGNOSIS — B18.2 CHRONIC HEPATITIS C WITHOUT HEPATIC COMA: Primary | ICD-10-CM

## 2019-06-18 LAB
ALBUMIN SERPL BCP-MCNC: 3.2 G/DL (ref 3.5–5.2)
ALP SERPL-CCNC: 58 U/L (ref 55–135)
ALT SERPL W/O P-5'-P-CCNC: 25 U/L (ref 10–44)
ANION GAP SERPL CALC-SCNC: 9 MMOL/L (ref 8–16)
AST SERPL-CCNC: 23 U/L (ref 10–40)
BASOPHILS # BLD AUTO: 0 K/UL (ref 0–0.2)
BASOPHILS NFR BLD: 0.5 % (ref 0–1.9)
BILIRUB SERPL-MCNC: 0.3 MG/DL (ref 0.1–1)
BUN SERPL-MCNC: 20 MG/DL (ref 6–20)
CALCIUM SERPL-MCNC: 9.4 MG/DL (ref 8.7–10.5)
CHLORIDE SERPL-SCNC: 107 MMOL/L (ref 95–110)
CO2 SERPL-SCNC: 23 MMOL/L (ref 23–29)
CREAT SERPL-MCNC: 1.1 MG/DL (ref 0.5–1.4)
DIFFERENTIAL METHOD: ABNORMAL
EOSINOPHIL # BLD AUTO: 0.8 K/UL (ref 0–0.5)
EOSINOPHIL NFR BLD: 8.9 % (ref 0–8)
ERYTHROCYTE [DISTWIDTH] IN BLOOD BY AUTOMATED COUNT: 13.7 % (ref 11.5–14.5)
EST. GFR  (AFRICAN AMERICAN): >60 ML/MIN/1.73 M^2
EST. GFR  (NON AFRICAN AMERICAN): 56 ML/MIN/1.73 M^2
GLUCOSE SERPL-MCNC: 250 MG/DL (ref 70–110)
HCT VFR BLD AUTO: 35 % (ref 37–48.5)
HGB BLD-MCNC: 11.8 G/DL (ref 12–16)
INR PPP: 1 (ref 0.8–1.2)
LYMPHOCYTES # BLD AUTO: 3.3 K/UL (ref 1–4.8)
LYMPHOCYTES NFR BLD: 38.4 % (ref 18–48)
MCH RBC QN AUTO: 29.8 PG (ref 27–31)
MCHC RBC AUTO-ENTMCNC: 33.6 G/DL (ref 32–36)
MCV RBC AUTO: 89 FL (ref 82–98)
MONOCYTES # BLD AUTO: 0.7 K/UL (ref 0.3–1)
MONOCYTES NFR BLD: 7.7 % (ref 4–15)
NEUTROPHILS # BLD AUTO: 3.8 K/UL (ref 1.8–7.7)
NEUTROPHILS NFR BLD: 44.5 % (ref 38–73)
PLATELET # BLD AUTO: 242 K/UL (ref 150–350)
PMV BLD AUTO: 10.2 FL (ref 9.2–12.9)
POTASSIUM SERPL-SCNC: 4.2 MMOL/L (ref 3.5–5.1)
PROT SERPL-MCNC: 7.8 G/DL (ref 6–8.4)
PROTHROMBIN TIME: 10 SEC (ref 9–12.5)
RBC # BLD AUTO: 3.95 M/UL (ref 4–5.4)
SODIUM SERPL-SCNC: 139 MMOL/L (ref 136–145)
WBC # BLD AUTO: 8.5 K/UL (ref 3.9–12.7)

## 2019-06-18 PROCEDURE — 36415 COLL VENOUS BLD VENIPUNCTURE: CPT

## 2019-06-18 PROCEDURE — 3079F DIAST BP 80-89 MM HG: CPT | Mod: CPTII,S$GLB,, | Performed by: PHYSICIAN ASSISTANT

## 2019-06-18 PROCEDURE — 86790 VIRUS ANTIBODY NOS: CPT

## 2019-06-18 PROCEDURE — 3077F PR MOST RECENT SYSTOLIC BLOOD PRESSURE >= 140 MM HG: ICD-10-PCS | Mod: CPTII,S$GLB,, | Performed by: PHYSICIAN ASSISTANT

## 2019-06-18 PROCEDURE — 3008F BODY MASS INDEX DOCD: CPT | Mod: CPTII,S$GLB,, | Performed by: PHYSICIAN ASSISTANT

## 2019-06-18 PROCEDURE — 87522 HEPATITIS C REVRS TRNSCRPJ: CPT

## 2019-06-18 PROCEDURE — 85610 PROTHROMBIN TIME: CPT

## 2019-06-18 PROCEDURE — 99203 OFFICE O/P NEW LOW 30 MIN: CPT | Mod: S$GLB,,, | Performed by: PHYSICIAN ASSISTANT

## 2019-06-18 PROCEDURE — 3079F PR MOST RECENT DIASTOLIC BLOOD PRESSURE 80-89 MM HG: ICD-10-PCS | Mod: CPTII,S$GLB,, | Performed by: PHYSICIAN ASSISTANT

## 2019-06-18 PROCEDURE — 81596 NFCT DS CHRNC HCV 6 ASSAYS: CPT

## 2019-06-18 PROCEDURE — 99999 PR PBB SHADOW E&M-EST. PATIENT-LVL III: ICD-10-PCS | Mod: PBBFAC,,, | Performed by: PHYSICIAN ASSISTANT

## 2019-06-18 PROCEDURE — 99999 PR PBB SHADOW E&M-EST. PATIENT-LVL III: CPT | Mod: PBBFAC,,, | Performed by: PHYSICIAN ASSISTANT

## 2019-06-18 PROCEDURE — 87902 NFCT AGT GNTYP ALYS HEP C: CPT

## 2019-06-18 PROCEDURE — 80053 COMPREHEN METABOLIC PANEL: CPT

## 2019-06-18 PROCEDURE — 3008F PR BODY MASS INDEX (BMI) DOCUMENTED: ICD-10-PCS | Mod: CPTII,S$GLB,, | Performed by: PHYSICIAN ASSISTANT

## 2019-06-18 PROCEDURE — 85025 COMPLETE CBC W/AUTO DIFF WBC: CPT

## 2019-06-18 PROCEDURE — 99203 PR OFFICE/OUTPT VISIT, NEW, LEVL III, 30-44 MIN: ICD-10-PCS | Mod: S$GLB,,, | Performed by: PHYSICIAN ASSISTANT

## 2019-06-18 PROCEDURE — 3077F SYST BP >= 140 MM HG: CPT | Mod: CPTII,S$GLB,, | Performed by: PHYSICIAN ASSISTANT

## 2019-06-18 NOTE — PROGRESS NOTES
"HEPATOLOGY CLINIC VISIT NOTE - HCV clinic    REFERRING PROVIDER: LENORA Kwan    CHIEF COMPLAINT: Hepatitis C     HISTORY: This is a 58 y.o. White female with chronic hepatitis C, here for further eval / mngmt.     HCV history:  Originally diagnosed about a year ago but recalls she used to be a regular blood donor until 2010 when she was told she could no longer due to "something in blood"    Risks for HCV:  tattoo placement: first one 1989    Denies blood transfusions, IVDA, intranasal drug use  - Treatment naive  - Genotype unknown  - NS5A resistance not known  - HCV RNA >5 million IU/mL - 2/2018    Liver staging:  No formal liver staging  No liver imaging    Labs reveal well preserved liver function  Labs 3/2019:   FIB-4 - 1.04 (cirrhosis less likely)   APRI - 0.22 (Cirrhosis less likely    Feels okay  (+) back / right leg pain  Denies jaundice, dark urine, abdominal distention, hematemesis, melena, slowed mentation.   No abnormal skin rashes. No generalized joint pain.                     Past Medical History:   Diagnosis Date    DDD (degenerative disc disease), lumbar     Diabetes mellitus     Essential tremor     Hepatitis C     HLD (hyperlipidemia)     HTN (hypertension)          Past Surgical History:   Procedure Laterality Date    BLOCK- BRANCH- SACROILIAC Right 2/9/2018    [facility]    Block-nerve-medial branch-lumbar Bilateral 7/10/2018    [facility] at [facility]    c-sec  1990    CHOLECYSTECTOMY      Injection,steroid,epidural,transforaminal approach L4-5, L5-S1 Right 4/8/2019    [facility] [place]    INJECTION-STEROID-EPIDURAL-LUMBAR Right 3/27/2018    [facility] [place]    INJECTION-STEROID-EPIDURAL-TRANSFORAMINAL Bilateral 5/21/2018    [facility] [place]    RADIOFREQUENCY ABLATION, NERVE, MEDIAL BRANCH, LUMBAR, 1 LEVEL Bilateral 7/27/2018    Per[facility] by [facility] at " [facility] [place]    Radiofrequency Ablation, Nerve, Spinal, Lumbar, Medial Branch, L2,3,4,5 Bilateral 2/7/2019    Performed by [facility] at [facility] [place]       FAMILY HISTORY: Negative for liver disease    SOCIAL HISTORY:   Social History     Tobacco Use   Smoking Status Current Every Day Smoker    Packs/day: 0.50    Years: 40.00    Pack years: 20.00   Smokeless Tobacco Never Used       Alcohol - 4-5 beers once a week now for past 13 yrs. Describes 20 year period of drinking heavier in the past  Drugs - denies        ROS:   No fever, chills, weight loss, fatigue  No chest pain, palpitations, dyspnea, cough  No abdominal pain, change in bowel pattern, nausea, vomiting, GERD  No dysuria, hematuria   No skin rashes   No headaches  (+) back and right leg pain  No lower extremity edema  No depression or anxiety      PHYSICAL EXAM:  Friendly White female, in no acute distress; alert and oriented to person, place and time (+) resting tremor noted  VITALS: reviewed  HEENT: Sclerae anicteric.   NECK: Supple  CVS: Regular rate and rhythm. No murmurs  LUNGS: Normal respiratory effort. Clear bilaterally  ABDOMEN: Flat, soft, nontender. No organomegaly or masses. No ascites or hernias. Good bowel sounds.    SKIN: Warm and dry. No jaundice, No obvious rashes.   EXTREMITIES: No lower extremity edema  NEURO/PSYCH: Normal gate. Memory intact. Thought and speech pattern appropriate. Behavior normal. No depression or anxiety noted.    RECENT LABS:  Lab Results   Component Value Date    WBC 13.90 (H) 02/12/2019    HGB 11.8 (L) 02/12/2019     02/12/2019     No results found for: INR  Lab Results   Component Value Date    AST 26 03/20/2019    ALT 23 03/20/2019    BILITOT 0.4 03/20/2019    ALBUMIN 3.5 03/20/2019    ALKPHOS 56 03/20/2019    CREATININE 0.9 03/20/2019    BUN 19 03/20/2019     03/20/2019    K 4.2 03/20/2019 2/23/2018 11:55   Hep B Core Total Ab Negative   Hep B S Ab Negative   Hepatitis B Surface Ag Negative        RECENT IMAGING:  NONE    ASSESSMENT  58 y.o. White female with:  1. CHRONIC HEPATITIS C, GENOTYPE UNKNOWN - treatment naive  -- Unknown Immunity to HAV   -- Lacking immunity to HBV infection      EDUCATION:  The natural history of Hepatitis C, including potential progression to cirrhosis was reviewed. We discussed the increased progression of liver disease secondary to alcohol use; patient was advised to avoid alcohol completely.     Transmission of Hepatitis C was reviewed, including possible sexual transmission. Sexual contacts should be screened. Risk of vertical transmission of Hepatitis C from mother to baby was reviewed. Children should be screened. Patient should avoid sharing personal products such as razors, toothbrushes, etc.     Recommend avoiding raw seafood.  Limit acetaminophen to 2000mg daily.          PLAN:  1. Labs & imaging  Orders Placed This Encounter   Procedures    US Abdomen Complete    Hepatitis C genotype    Hepatitis A antibody, IgG    CBC auto differential    Comprehensive metabolic panel    Protime-INR    HCV FibroSURE       2. Follow up visit. Goal of antiviral therapy

## 2019-06-18 NOTE — LETTER
June 18, 2019      LENORA Kwan  [address]  [place] [address]           [facility]at[facility]  [address], [address]  [facility] [address]  Phone: [phone]          Patient: Mich Knight   MR Number: [id number]   YOB: 1960   Date of Visit: 6/18/2019       Dear Laya Bonilla:    Thank you for referring Mich Knight to me for evaluation. Attached you will find relevant portions of my assessment and plan of care.    If you have questions, please do not hesitate to call me. I look forward to following Mich Knight along with you.    Sincerely,    Jennifer B. Scheuermann, PA    Enclosure  CC:  No Recipients    If you would like to receive this communication electronically, please contact [email or web address] or [phone] to request more information on [facility] access.    For providers and/or their staff who would like to refer a patient to [facility], please contact us through our one-stop-shop provider referral line, [facility], at [phone].    If you feel you have received this communication in error or would no longer like to receive these types of communications, please e-mail [email or web address]

## 2019-06-19 LAB — HEPATITIS A ANTIBODY, IGG: NEGATIVE

## 2019-06-20 LAB
A2 MACROGLOB SERPL-MCNC: 388 MG/DL (ref 100–280)
ALT SERPL W P-5'-P-CCNC: 24 U/L (ref 7–45)
APO A-I SERPL-MCNC: 137 MG/DL
BILIRUB SERPL-MCNC: 0.2 MG/DL
BIOPREDICTIVE SERIAL NUMBER: ABNORMAL
FIBROSIS STAGE SERPL QL: ABNORMAL
FIBROTEST INTERPRETATION: ABNORMAL
FIBROTEST-ACTITEST COMMENT: ABNORMAL
GGT SERPL-CCNC: 33 U/L (ref 5–36)
HAPTOGLOB SERPL-MCNC: 354 MG/DL (ref 30–200)
LIVER FIBR SCORE SERPL CALC.FIBROSURE: 0.24
NECROINFLAMMAT INTERP: ABNORMAL
NECROINFLAMMATORY ACT GRADE SERPL QL: ABNORMAL
NECROINFLAMMATORY ACT SCORE SERPL: 0.1

## 2019-06-21 ENCOUNTER — DOCUMENTATION ONLY (OUTPATIENT)
Dept: FAMILY MEDICINE | Facility: CLINIC | Age: 59
End: 2019-06-21

## 2019-06-21 LAB
HCV GENTYP SERPL NAA+PROBE: ABNORMAL
HCV RNA SERPL NAA+PROBE-LOG IU: 7.19 LOG (10) IU/ML
HCV RNA SERPL QL NAA+PROBE: DETECTED
HCV RNA SPEC NAA+PROBE-ACNC: ABNORMAL IU/ML

## 2019-06-21 NOTE — PROGRESS NOTES
Pre-Visit Chart Review  For Appointment Scheduled on 6/24/19    Health Maintenance Due   Topic Date Due    TETANUS VACCINE  07/13/1979

## 2019-06-24 ENCOUNTER — OFFICE VISIT (OUTPATIENT)
Dept: FAMILY MEDICINE | Facility: CLINIC | Age: 59
End: 2019-06-24
Payer: MEDICARE

## 2019-06-24 VITALS
WEIGHT: 141.13 LBS | HEART RATE: 125 BPM | HEIGHT: 62 IN | BODY MASS INDEX: 25.97 KG/M2 | TEMPERATURE: 98 F | SYSTOLIC BLOOD PRESSURE: 138 MMHG | OXYGEN SATURATION: 98 % | DIASTOLIC BLOOD PRESSURE: 82 MMHG

## 2019-06-24 DIAGNOSIS — M54.16 LUMBAR RADICULITIS: Primary | ICD-10-CM

## 2019-06-24 DIAGNOSIS — I15.2 HYPERTENSION ASSOCIATED WITH DIABETES: ICD-10-CM

## 2019-06-24 DIAGNOSIS — E11.59 HYPERTENSION ASSOCIATED WITH DIABETES: ICD-10-CM

## 2019-06-24 DIAGNOSIS — E78.5 HYPERLIPIDEMIA, UNSPECIFIED HYPERLIPIDEMIA TYPE: ICD-10-CM

## 2019-06-24 DIAGNOSIS — E11.8 TYPE 2 DIABETES MELLITUS WITH COMPLICATION, WITHOUT LONG-TERM CURRENT USE OF INSULIN: ICD-10-CM

## 2019-06-24 PROCEDURE — 2024F 7 FLD RTA PHOTO EVC RTNOPTHY: CPT | Mod: S$GLB,,, | Performed by: FAMILY MEDICINE

## 2019-06-24 PROCEDURE — 3044F PR MOST RECENT HEMOGLOBIN A1C LEVEL <7.0%: ICD-10-PCS | Mod: CPTII,S$GLB,, | Performed by: FAMILY MEDICINE

## 2019-06-24 PROCEDURE — 3008F BODY MASS INDEX DOCD: CPT | Mod: CPTII,S$GLB,, | Performed by: FAMILY MEDICINE

## 2019-06-24 PROCEDURE — 3079F PR MOST RECENT DIASTOLIC BLOOD PRESSURE 80-89 MM HG: ICD-10-PCS | Mod: CPTII,S$GLB,, | Performed by: FAMILY MEDICINE

## 2019-06-24 PROCEDURE — 2024F PR 7 FIELD PHOTOS WITH INTERP/ REVIEW: ICD-10-PCS | Mod: S$GLB,,, | Performed by: FAMILY MEDICINE

## 2019-06-24 PROCEDURE — 3075F PR MOST RECENT SYSTOLIC BLOOD PRESS GE 130-139MM HG: ICD-10-PCS | Mod: CPTII,S$GLB,, | Performed by: FAMILY MEDICINE

## 2019-06-24 PROCEDURE — 99214 OFFICE O/P EST MOD 30 MIN: CPT | Mod: S$GLB,,, | Performed by: FAMILY MEDICINE

## 2019-06-24 PROCEDURE — 99999 PR PBB SHADOW E&M-EST. PATIENT-LVL IV: CPT | Mod: PBBFAC,,, | Performed by: FAMILY MEDICINE

## 2019-06-24 PROCEDURE — 3008F PR BODY MASS INDEX (BMI) DOCUMENTED: ICD-10-PCS | Mod: CPTII,S$GLB,, | Performed by: FAMILY MEDICINE

## 2019-06-24 PROCEDURE — 3075F SYST BP GE 130 - 139MM HG: CPT | Mod: CPTII,S$GLB,, | Performed by: FAMILY MEDICINE

## 2019-06-24 PROCEDURE — 3044F HG A1C LEVEL LT 7.0%: CPT | Mod: CPTII,S$GLB,, | Performed by: FAMILY MEDICINE

## 2019-06-24 PROCEDURE — 99214 PR OFFICE/OUTPT VISIT, EST, LEVL IV, 30-39 MIN: ICD-10-PCS | Mod: S$GLB,,, | Performed by: FAMILY MEDICINE

## 2019-06-24 PROCEDURE — 99999 PR PBB SHADOW E&M-EST. PATIENT-LVL IV: ICD-10-PCS | Mod: PBBFAC,,, | Performed by: FAMILY MEDICINE

## 2019-06-24 PROCEDURE — 3079F DIAST BP 80-89 MM HG: CPT | Mod: CPTII,S$GLB,, | Performed by: FAMILY MEDICINE

## 2019-06-24 RX ORDER — DULOXETIN HYDROCHLORIDE 20 MG/1
20 CAPSULE, DELAYED RELEASE ORAL DAILY
Qty: 30 CAPSULE | Refills: 2 | Status: SHIPPED | OUTPATIENT
Start: 2019-06-24 | End: 2019-10-01

## 2019-06-24 NOTE — PROGRESS NOTES
Subjective:   Patient ID: Mich Knight is a 58 y.o. female     Chief Complaint:Follow-up (3 months)      Patient here for follow-up and persistent lower back pain with radiating pain down her leg.  This has been going on for years.  Patient has been evaluated Pain Management underwent injections with limited improvement.  Patient seen in the emergency room for tramadol was discharged.  Patient is requesting tramadol refills.  Currently patient does not follow with pain management but has an appointment in few weeks.    Review of Systems   Constitutional: Positive for activity change. Negative for unexpected weight change.   HENT: Negative for hearing loss, rhinorrhea and trouble swallowing.    Eyes: Positive for visual disturbance. Negative for discharge.   Respiratory: Negative for chest tightness and wheezing.    Cardiovascular: Negative for chest pain and palpitations.   Gastrointestinal: Positive for diarrhea. Negative for blood in stool, constipation and vomiting.   Endocrine: Negative for polydipsia and polyuria.   Genitourinary: Negative for difficulty urinating, dysuria, hematuria and menstrual problem.   Musculoskeletal: Positive for arthralgias. Negative for joint swelling and neck pain.   Neurological: Positive for headaches. Negative for weakness.   Psychiatric/Behavioral: Positive for dysphoric mood. Negative for confusion.     Past Medical History:   Diagnosis Date    DDD (degenerative disc disease), lumbar     Diabetes mellitus     Essential tremor     Hepatitis C     HLD (hyperlipidemia)     HTN (hypertension)      Objective:     Vitals:    06/24/19 0934   BP: 138/82   Pulse: (!) 125   Temp: 98.2 °F (36.8 °C)     Body mass index is 25.81 kg/m².  Physical Exam   Neck: Neck supple. No tracheal deviation present.   Cardiovascular: Normal rate, regular rhythm and normal heart sounds.   Pulmonary/Chest: Effort normal. No respiratory distress.   Musculoskeletal: Normal range of motion. She  exhibits no edema.   Psychiatric: She has a normal mood and affect.     Assessment:     1. Lumbar radiculitis    2. Hyperlipidemia, unspecified hyperlipidemia type    3. Hypertension associated with diabetes    4. Type 2 diabetes mellitus with complication, without long-term current use of insulin      Plan:   Lumbar radiculitis  -     DULoxetine (CYMBALTA) 20 MG capsule; Take 1 capsule (20 mg total) by mouth once daily.  Dispense: 30 capsule; Refill: 2  Advise patient that I would not prescribe opioids and recommended against them due to risks associated.  Will start patient on Cymbalta and patient has been counseled on the risks associated with this medication.  Patient understands that she may discontinue this medication if it does not assist with her pain.  Advised her that I would take it for least a week prior to discontinuing unless she has issues otherwise for which she was instructed to contact my office.    Hyperlipidemia, unspecified hyperlipidemia type  -     Comprehensive metabolic panel; Future; Expected date: 09/24/2019    Hypertension associated with diabetes  -     Comprehensive metabolic panel; Future; Expected date: 09/24/2019    Type 2 diabetes mellitus with complication, without long-term current use of insulin  -     Hemoglobin A1c; Future; Expected date: 09/24/2019      Time spent with patient: 30 minutes and over half of that time was spent on counseling an coordination of care.    Huang Nicholson MD  06/24/2019    Portions of this note have been dictated with NADEEM Sims

## 2019-07-02 ENCOUNTER — INITIAL CONSULT (OUTPATIENT)
Dept: DERMATOLOGY | Facility: CLINIC | Age: 59
End: 2019-07-02
Payer: MEDICARE

## 2019-07-02 VITALS — HEIGHT: 62 IN | WEIGHT: 141 LBS | BODY MASS INDEX: 25.95 KG/M2

## 2019-07-02 DIAGNOSIS — Z12.83 SKIN CANCER SCREENING: ICD-10-CM

## 2019-07-02 DIAGNOSIS — L57.8 SUN-DAMAGED SKIN: ICD-10-CM

## 2019-07-02 DIAGNOSIS — L72.9 CYST OF SKIN: Primary | ICD-10-CM

## 2019-07-02 PROCEDURE — 99999 PR PBB SHADOW E&M-EST. PATIENT-LVL II: CPT | Mod: PBBFAC,,, | Performed by: DERMATOLOGY

## 2019-07-02 PROCEDURE — 3008F BODY MASS INDEX DOCD: CPT | Mod: CPTII,S$GLB,, | Performed by: DERMATOLOGY

## 2019-07-02 PROCEDURE — 99202 OFFICE O/P NEW SF 15 MIN: CPT | Mod: S$GLB,,, | Performed by: DERMATOLOGY

## 2019-07-02 PROCEDURE — 99202 PR OFFICE/OUTPT VISIT, NEW, LEVL II, 15-29 MIN: ICD-10-PCS | Mod: S$GLB,,, | Performed by: DERMATOLOGY

## 2019-07-02 PROCEDURE — 3008F PR BODY MASS INDEX (BMI) DOCUMENTED: ICD-10-PCS | Mod: CPTII,S$GLB,, | Performed by: DERMATOLOGY

## 2019-07-02 PROCEDURE — 99999 PR PBB SHADOW E&M-EST. PATIENT-LVL II: ICD-10-PCS | Mod: PBBFAC,,, | Performed by: DERMATOLOGY

## 2019-07-02 NOTE — LETTER
July 2, 2019      [place] NP  [address] Located within [facility] [id number] [place] [address], [address]  [facility] [address]  Phone: [phone]          Patient: Mich Knight   MR Number: [id number]   YOB: 1960   Date of Visit: 7/2/2019       Dear Saundra Valdez:    Thank you for referring Mich Knight to me for evaluation. Attached you will find relevant portions of my assessment and plan of care.    If you have questions, please do not hesitate to call me. I look forward to following Mich Knight along with you.    Sincerely,    Charles Dong MD    Enclosure  CC:  No Recipients    If you would like to receive this communication electronically, please contact [email or web address] or [phone] to request more information on [employer] Link access.    For providers and/or their staff who would like to refer a patient to [facility], please contact us through our one-stop-shop provider referral line, [facility], at [phone].    If you feel you have received this communication in error or would no longer like to receive these types of communications, please e-mail [email or web address]

## 2019-07-02 NOTE — PROGRESS NOTES
Subjective:       Patient ID:  Mich Knight is a 58 y.o. female who presents for   Chief Complaint   Patient presents with    Spot     back      HPI     Initial consult.   No phx or fhx of skin cancer.    Presents today for a spot the back since Sept/Oct 2018, painful, getting wider and raised. Did drain once. Used Sore cream.     Review of Systems   Constitutional: Negative for fever, chills and fatigue.   HENT: Negative for nosebleeds, congestion and sore throat.    Respiratory: Negative for cough and shortness of breath.    Musculoskeletal: Negative for joint swelling and arthralgias.   Skin: Positive for daily sunscreen use, activity-related sunscreen use and wears hat.   Hematologic/Lymphatic: Bruises/bleeds easily.        Objective:    Physical Exam   Constitutional: She appears well-developed and well-nourished. No distress.   Eyes: No conjunctival no injection.   Neurological: She is alert and oriented to person, place, and time. She is not disoriented.   Psychiatric: She has a normal mood and affect.   Skin:   Areas Examined (abnormalities noted in diagram):   Scalp / Hair Palpated and Inspected  Head / Face Inspection Performed  Neck Inspection Performed  Chest / Axilla Inspection Performed  Back Inspection Performed  RUE Inspected  LUE Inspection Performed  Nails and Digits Inspection Performed              Diagram Legend     Erythematous scaling macule/papule c/w actinic keratosis       Vascular papule c/w angioma      Pigmented verrucoid papule/plaque c/w seborrheic keratosis      Yellow umbilicated papule c/w sebaceous hyperplasia      Irregularly shaped tan macule c/w lentigo     1-2 mm smooth white papules consistent with Milia      Movable subcutaneous cyst with punctum c/w epidermal inclusion cyst      Subcutaneous movable cyst c/w pilar cyst      Firm pink to brown papule c/w dermatofibroma      Pedunculated fleshy papule(s) c/w skin tag(s)      Evenly pigmented macule c/w junctional nevus      Mildly variegated pigmented, slightly irregular-bordered macule c/w mildly atypical nevus      Flesh colored to evenly pigmented papule c/w intradermal nevus       Pink pearly papule/plaque c/w basal cell carcinoma      Erythematous hyperkeratotic cursted plaque c/w SCC      Surgical scar with no sign of skin cancer recurrence      Open and closed comedones      Inflammatory papules and pustules      Verrucoid papule consistent consistent with wart     Erythematous eczematous patches and plaques     Dystrophic onycholytic nail with subungual debris c/w onychomycosis     Umbilicated papule    Erythematous-base heme-crusted tan verrucoid plaque consistent with inflamed seborrheic keratosis     Erythematous Silvery Scaling Plaque c/w Psoriasis     See annotation      Assessment / Plan:        Cyst of skin  Discussed with patient the likelihood that this lesion is an epidermal cyst caused by an involuted lining of skin with dead skin trapped inside.  Cysts do not have a malignant potential but can become infected and turn into abscesses with possible cellulitis.  Discussed the option of warm compresses if infected with oral antibiotics.  Discussed the option of surgical excision with scar, possible recurrence, hematoma, and infection.  Patient to consider these options.    Skin cancer screening  Recommended a full body skin check for moles and skin cancer screening to be done later.    Sun-damaged skin  We will recheck the chest at our follow up appointment.             Follow up if symptoms worsen or fail to improve, for Procedure.

## 2019-07-23 ENCOUNTER — PROCEDURE VISIT (OUTPATIENT)
Dept: DERMATOLOGY | Facility: CLINIC | Age: 59
End: 2019-07-23
Payer: MEDICARE

## 2019-07-23 VITALS — HEIGHT: 62 IN | WEIGHT: 141 LBS | BODY MASS INDEX: 25.95 KG/M2

## 2019-07-23 DIAGNOSIS — L72.0 EPIDERMAL CYST: Primary | ICD-10-CM

## 2019-07-23 PROCEDURE — 12031 PR LAYR CLOS WND TRUNK,ARM,LEG <2.5 CM: ICD-10-PCS | Mod: S$GLB,,, | Performed by: DERMATOLOGY

## 2019-07-23 PROCEDURE — 12031 INTMD RPR S/A/T/EXT 2.5 CM/<: CPT | Mod: S$GLB,,, | Performed by: DERMATOLOGY

## 2019-07-23 PROCEDURE — 99499 NO LOS: ICD-10-PCS | Mod: S$GLB,,, | Performed by: DERMATOLOGY

## 2019-07-23 PROCEDURE — 99499 UNLISTED E&M SERVICE: CPT | Mod: S$GLB,,, | Performed by: DERMATOLOGY

## 2019-07-23 PROCEDURE — 11402 PR EXC SKIN BENIG 1.1-2 CM TRUNK,ARM,LEG: ICD-10-PCS | Mod: 51,S$GLB,, | Performed by: DERMATOLOGY

## 2019-07-23 PROCEDURE — 11402 EXC TR-EXT B9+MARG 1.1-2 CM: CPT | Mod: 51,S$GLB,, | Performed by: DERMATOLOGY

## 2019-07-23 PROCEDURE — 88304 TISSUE SPECIMEN TO PATHOLOGY, DERMATOLOGY: ICD-10-PCS | Mod: 26,,, | Performed by: PATHOLOGY

## 2019-07-23 PROCEDURE — 88304 TISSUE EXAM BY PATHOLOGIST: CPT | Performed by: PATHOLOGY

## 2019-07-23 NOTE — PATIENT INSTRUCTIONS

## 2019-07-23 NOTE — PROGRESS NOTES
PROCEDURE: Elliptical excision with intermediate layered repair in order to decrease dead space.    ANESTHETIC: 2 cc 1% Xylocaine with Epinephrine 1:100,000, buffered    SURGEON: Charles Dong M.D.    ASSISTANTS: Marcie Melissa LPN    PREOPERATIVE DIAGNOSIS:  cyst    POSTOPERATIVE DIAGNOSIS:  Same as preoperative diagnosis    PATHOLOGIC DIAGNOSIS: Pending    LOCATION: back    INITIAL LESION SIZE: 1.8 cm    EXCISED DIAMETER: 1.8 cm    PREPARATION: The diagnosis, procedure, alternatives, benefits and risks, including but not limited to: infection, bleeding/bruising, drug reactions, pain, scar or cosmetic defect, local sensation disturbances, wound dehiscence (separation of wound edges after sutures removed) and/or recurrence of present condition were explained to the patient. The patient elected to proceed.  Patient's identity was verified using 2 patient identifiers and the side and site was verified.  Time out period with surgeon, assistant and patient in surgical suite was taken.    PROCEDURE: The location noted above was prepped, draped, and anesthetized in the usual sterile fashion per Marcie Melissa LPN. Lesional tissue was carefully marked with at least 0 mm margins of clinically normal skin in all directions. A fusiform elliptical excision was done with #15 blade carried down completely through the dermis into the deep subcutaneous tissues to the level of the non-muscle fascia, and dissection was carried out in that plane.  Electrocoagulation was used to obtain hemostasis. Blood loss was minimal. The wound was then approximated in a layered fashion with subcutaneous and intradermal sutures of 4.0 Monocryl, approximately 1 in number, and the wound was then superficially closed with simple interrupted sutures of 3.0 Prolene.    The patient tolerated the procedure well.    The area was cleaned and dressed appropriately and the patient was given wound care instructions, as well as an appointment for follow-up  evaluation.    LENGTH OF REPAIR: 1.2 cm

## 2019-07-29 ENCOUNTER — TELEPHONE (OUTPATIENT)
Dept: HEPATOLOGY | Facility: CLINIC | Age: 59
End: 2019-07-29

## 2019-07-29 NOTE — TELEPHONE ENCOUNTER
Patient cancelled appt with PA Scheuermann at [facility] location.  Attempt made to reach patient.  LVM asking that she call us back to reschedule appt provider.

## 2019-08-05 DIAGNOSIS — M54.16 RADICULOPATHY, LUMBAR REGION: Primary | ICD-10-CM

## 2019-08-07 ENCOUNTER — CLINICAL SUPPORT (OUTPATIENT)
Dept: DERMATOLOGY | Facility: CLINIC | Age: 59
End: 2019-08-07
Payer: MEDICARE

## 2019-08-07 VITALS — BODY MASS INDEX: 25.95 KG/M2 | WEIGHT: 141 LBS | HEIGHT: 62 IN

## 2019-08-07 DIAGNOSIS — Z48.02 VISIT FOR SUTURE REMOVAL: Primary | ICD-10-CM

## 2019-08-07 PROCEDURE — 99999 PR PBB SHADOW E&M-EST. PATIENT-LVL III: CPT | Mod: PBBFAC,,,

## 2019-08-07 PROCEDURE — 99024 PR POST-OP FOLLOW-UP VISIT: ICD-10-PCS | Mod: S$GLB,,, | Performed by: DERMATOLOGY

## 2019-08-07 PROCEDURE — 99024 POSTOP FOLLOW-UP VISIT: CPT | Mod: S$GLB,,, | Performed by: DERMATOLOGY

## 2019-08-07 PROCEDURE — 99999 PR PBB SHADOW E&M-EST. PATIENT-LVL III: ICD-10-PCS | Mod: PBBFAC,,,

## 2019-08-09 ENCOUNTER — HOSPITAL ENCOUNTER (OUTPATIENT)
Dept: RADIOLOGY | Facility: HOSPITAL | Age: 59
Discharge: HOME | End: 2019-08-09
Attending: PAIN MEDICINE
Payer: MEDICARE

## 2019-08-09 DIAGNOSIS — M54.16 RADICULOPATHY, LUMBAR REGION: ICD-10-CM

## 2019-08-09 PROCEDURE — 72148 MRI LUMBAR SPINE W/O DYE: CPT | Mod: 26,,, | Performed by: RADIOLOGY

## 2019-08-09 PROCEDURE — 72148 MRI LUMBAR SPINE W/O DYE: CPT | Mod: TC

## 2019-08-09 PROCEDURE — 72148 MRI LUMBAR SPINE WITHOUT CONTRAST: ICD-10-PCS | Mod: 26,,, | Performed by: RADIOLOGY

## 2019-08-14 DIAGNOSIS — E11.65 UNCONTROLLED TYPE 2 DIABETES MELLITUS WITH HYPERGLYCEMIA: ICD-10-CM

## 2019-08-14 RX ORDER — METFORMIN HYDROCHLORIDE 1000 MG/1
TABLET ORAL
Qty: 180 TABLET | Refills: 3 | Status: SHIPPED | OUTPATIENT
Start: 2019-08-14 | End: 2020-10-02 | Stop reason: SDUPTHER

## 2019-09-11 DIAGNOSIS — E11.65 UNCONTROLLED TYPE 2 DIABETES MELLITUS WITH HYPERGLYCEMIA: ICD-10-CM

## 2019-09-23 ENCOUNTER — TELEPHONE (OUTPATIENT)
Dept: FAMILY MEDICINE | Facility: CLINIC | Age: 59
End: 2019-09-23

## 2019-09-23 ENCOUNTER — HOSPITAL ENCOUNTER (OUTPATIENT)
Dept: RADIOLOGY | Facility: HOSPITAL | Age: 59
Discharge: HOME | End: 2019-09-23
Attending: ANESTHESIOLOGY
Payer: MEDICARE

## 2019-09-23 DIAGNOSIS — M25.561 RIGHT KNEE PAIN: ICD-10-CM

## 2019-09-23 PROCEDURE — 73721 MRI KNEE WITHOUT CONTRAST RIGHT: ICD-10-PCS | Mod: 26,RT,, | Performed by: RADIOLOGY

## 2019-09-23 PROCEDURE — 73721 MRI JNT OF LWR EXTRE W/O DYE: CPT | Mod: TC,RT

## 2019-09-23 PROCEDURE — 73721 MRI JNT OF LWR EXTRE W/O DYE: CPT | Mod: 26,RT,, | Performed by: RADIOLOGY

## 2019-09-27 ENCOUNTER — LAB VISIT (OUTPATIENT)
Dept: LAB | Facility: HOSPITAL | Age: 59
End: 2019-09-27
Attending: FAMILY MEDICINE
Payer: MEDICARE

## 2019-09-27 DIAGNOSIS — E78.5 HYPERLIPIDEMIA, UNSPECIFIED HYPERLIPIDEMIA TYPE: ICD-10-CM

## 2019-09-27 DIAGNOSIS — E11.8 TYPE 2 DIABETES MELLITUS WITH COMPLICATION, WITHOUT LONG-TERM CURRENT USE OF INSULIN: ICD-10-CM

## 2019-09-27 DIAGNOSIS — I15.2 HYPERTENSION ASSOCIATED WITH DIABETES: ICD-10-CM

## 2019-09-27 DIAGNOSIS — E11.59 HYPERTENSION ASSOCIATED WITH DIABETES: ICD-10-CM

## 2019-09-27 LAB
ALBUMIN SERPL BCP-MCNC: 3.5 G/DL (ref 3.5–5.2)
ALP SERPL-CCNC: 51 U/L (ref 55–135)
ALT SERPL W/O P-5'-P-CCNC: 19 U/L (ref 10–44)
ANION GAP SERPL CALC-SCNC: 10 MMOL/L (ref 8–16)
AST SERPL-CCNC: 22 U/L (ref 10–40)
BILIRUB SERPL-MCNC: 0.4 MG/DL (ref 0.1–1)
BUN SERPL-MCNC: 12 MG/DL (ref 6–20)
CALCIUM SERPL-MCNC: 10.1 MG/DL (ref 8.7–10.5)
CHLORIDE SERPL-SCNC: 102 MMOL/L (ref 95–110)
CO2 SERPL-SCNC: 25 MMOL/L (ref 23–29)
CREAT SERPL-MCNC: 0.9 MG/DL (ref 0.5–1.4)
EST. GFR  (AFRICAN AMERICAN): >60 ML/MIN/1.73 M^2
EST. GFR  (NON AFRICAN AMERICAN): >60 ML/MIN/1.73 M^2
ESTIMATED AVG GLUCOSE: 151 MG/DL (ref 68–131)
GLUCOSE SERPL-MCNC: 216 MG/DL (ref 70–110)
HBA1C MFR BLD HPLC: 6.9 % (ref 4–5.6)
POTASSIUM SERPL-SCNC: 4.2 MMOL/L (ref 3.5–5.1)
PROT SERPL-MCNC: 7.9 G/DL (ref 6–8.4)
SODIUM SERPL-SCNC: 137 MMOL/L (ref 136–145)

## 2019-09-27 PROCEDURE — 80053 COMPREHEN METABOLIC PANEL: CPT

## 2019-09-27 PROCEDURE — 83036 HEMOGLOBIN GLYCOSYLATED A1C: CPT

## 2019-09-27 PROCEDURE — 36415 COLL VENOUS BLD VENIPUNCTURE: CPT | Mod: PO

## 2019-09-30 ENCOUNTER — DOCUMENTATION ONLY (OUTPATIENT)
Dept: FAMILY MEDICINE | Facility: CLINIC | Age: 59
End: 2019-09-30

## 2019-09-30 NOTE — PROGRESS NOTES
Pre-Visit Chart Review  For Appointment Scheduled on (10/1/19)    Health Maintenance Due   Topic Date Due    TETANUS VACCINE  07/13/1979

## 2019-10-01 ENCOUNTER — OFFICE VISIT (OUTPATIENT)
Dept: FAMILY MEDICINE | Facility: CLINIC | Age: 59
End: 2019-10-01
Payer: MEDICARE

## 2019-10-01 ENCOUNTER — OFFICE VISIT (OUTPATIENT)
Dept: ORTHOPEDICS | Facility: CLINIC | Age: 59
End: 2019-10-01
Payer: MEDICARE

## 2019-10-01 VITALS
HEIGHT: 62 IN | TEMPERATURE: 98 F | HEART RATE: 105 BPM | DIASTOLIC BLOOD PRESSURE: 76 MMHG | BODY MASS INDEX: 25.15 KG/M2 | WEIGHT: 136.69 LBS | SYSTOLIC BLOOD PRESSURE: 122 MMHG | OXYGEN SATURATION: 94 %

## 2019-10-01 VITALS
BODY MASS INDEX: 25.03 KG/M2 | HEIGHT: 62 IN | DIASTOLIC BLOOD PRESSURE: 71 MMHG | SYSTOLIC BLOOD PRESSURE: 114 MMHG | HEART RATE: 101 BPM | WEIGHT: 136 LBS

## 2019-10-01 DIAGNOSIS — B18.2 HEP C W/O COMA, CHRONIC: ICD-10-CM

## 2019-10-01 DIAGNOSIS — M25.561 CHRONIC PAIN OF RIGHT KNEE: ICD-10-CM

## 2019-10-01 DIAGNOSIS — E11.8 TYPE 2 DIABETES MELLITUS WITH COMPLICATION, WITHOUT LONG-TERM CURRENT USE OF INSULIN: Primary | ICD-10-CM

## 2019-10-01 DIAGNOSIS — G89.29 CHRONIC PAIN OF RIGHT KNEE: ICD-10-CM

## 2019-10-01 PROCEDURE — 3044F HG A1C LEVEL LT 7.0%: CPT | Mod: CPTII,S$GLB,, | Performed by: PHYSICIAN ASSISTANT

## 2019-10-01 PROCEDURE — 99499 UNLISTED E&M SERVICE: CPT | Mod: S$GLB,,, | Performed by: PHYSICIAN ASSISTANT

## 2019-10-01 PROCEDURE — 99204 PR OFFICE/OUTPT VISIT, NEW, LEVL IV, 45-59 MIN: ICD-10-PCS | Mod: S$GLB,,, | Performed by: ORTHOPAEDIC SURGERY

## 2019-10-01 PROCEDURE — 3008F PR BODY MASS INDEX (BMI) DOCUMENTED: ICD-10-PCS | Mod: CPTII,S$GLB,, | Performed by: ORTHOPAEDIC SURGERY

## 2019-10-01 PROCEDURE — 99999 PR PBB SHADOW E&M-EST. PATIENT-LVL V: ICD-10-PCS | Mod: PBBFAC,,, | Performed by: PHYSICIAN ASSISTANT

## 2019-10-01 PROCEDURE — 3074F PR MOST RECENT SYSTOLIC BLOOD PRESSURE < 130 MM HG: ICD-10-PCS | Mod: CPTII,S$GLB,, | Performed by: PHYSICIAN ASSISTANT

## 2019-10-01 PROCEDURE — 3074F PR MOST RECENT SYSTOLIC BLOOD PRESSURE < 130 MM HG: ICD-10-PCS | Mod: CPTII,S$GLB,, | Performed by: ORTHOPAEDIC SURGERY

## 2019-10-01 PROCEDURE — 3044F PR MOST RECENT HEMOGLOBIN A1C LEVEL <7.0%: ICD-10-PCS | Mod: CPTII,S$GLB,, | Performed by: PHYSICIAN ASSISTANT

## 2019-10-01 PROCEDURE — 3078F PR MOST RECENT DIASTOLIC BLOOD PRESSURE < 80 MM HG: ICD-10-PCS | Mod: CPTII,S$GLB,, | Performed by: ORTHOPAEDIC SURGERY

## 2019-10-01 PROCEDURE — 99204 OFFICE O/P NEW MOD 45 MIN: CPT | Mod: S$GLB,,, | Performed by: ORTHOPAEDIC SURGERY

## 2019-10-01 PROCEDURE — 99213 PR OFFICE/OUTPT VISIT, EST, LEVL III, 20-29 MIN: ICD-10-PCS | Mod: S$GLB,,, | Performed by: PHYSICIAN ASSISTANT

## 2019-10-01 PROCEDURE — 3074F SYST BP LT 130 MM HG: CPT | Mod: CPTII,S$GLB,, | Performed by: ORTHOPAEDIC SURGERY

## 2019-10-01 PROCEDURE — 99213 OFFICE O/P EST LOW 20 MIN: CPT | Mod: S$GLB,,, | Performed by: PHYSICIAN ASSISTANT

## 2019-10-01 PROCEDURE — 3074F SYST BP LT 130 MM HG: CPT | Mod: CPTII,S$GLB,, | Performed by: PHYSICIAN ASSISTANT

## 2019-10-01 PROCEDURE — 3008F PR BODY MASS INDEX (BMI) DOCUMENTED: ICD-10-PCS | Mod: CPTII,S$GLB,, | Performed by: PHYSICIAN ASSISTANT

## 2019-10-01 PROCEDURE — 3078F DIAST BP <80 MM HG: CPT | Mod: CPTII,S$GLB,, | Performed by: PHYSICIAN ASSISTANT

## 2019-10-01 PROCEDURE — 2024F PR 7 FIELD PHOTOS WITH INTERP/ REVIEW: ICD-10-PCS | Mod: S$GLB,,, | Performed by: PHYSICIAN ASSISTANT

## 2019-10-01 PROCEDURE — 3078F DIAST BP <80 MM HG: CPT | Mod: CPTII,S$GLB,, | Performed by: ORTHOPAEDIC SURGERY

## 2019-10-01 PROCEDURE — 99999 PR PBB SHADOW E&M-EST. PATIENT-LVL III: ICD-10-PCS | Mod: PBBFAC,,, | Performed by: ORTHOPAEDIC SURGERY

## 2019-10-01 PROCEDURE — 2024F 7 FLD RTA PHOTO EVC RTNOPTHY: CPT | Mod: S$GLB,,, | Performed by: PHYSICIAN ASSISTANT

## 2019-10-01 PROCEDURE — 99999 PR PBB SHADOW E&M-EST. PATIENT-LVL III: CPT | Mod: PBBFAC,,, | Performed by: ORTHOPAEDIC SURGERY

## 2019-10-01 PROCEDURE — 3008F BODY MASS INDEX DOCD: CPT | Mod: CPTII,S$GLB,, | Performed by: ORTHOPAEDIC SURGERY

## 2019-10-01 PROCEDURE — 3008F BODY MASS INDEX DOCD: CPT | Mod: CPTII,S$GLB,, | Performed by: PHYSICIAN ASSISTANT

## 2019-10-01 PROCEDURE — 99999 PR PBB SHADOW E&M-EST. PATIENT-LVL V: CPT | Mod: PBBFAC,,, | Performed by: PHYSICIAN ASSISTANT

## 2019-10-01 PROCEDURE — 3078F PR MOST RECENT DIASTOLIC BLOOD PRESSURE < 80 MM HG: ICD-10-PCS | Mod: CPTII,S$GLB,, | Performed by: PHYSICIAN ASSISTANT

## 2019-10-01 PROCEDURE — 99499 RISK ADDL DX/OHS AUDIT: ICD-10-PCS | Mod: S$GLB,,, | Performed by: PHYSICIAN ASSISTANT

## 2019-10-01 RX ORDER — GABAPENTIN 300 MG/1
300 CAPSULE ORAL 3 TIMES DAILY
Refills: 2 | COMMUNITY
Start: 2019-09-18 | End: 2020-07-08

## 2019-10-01 RX ORDER — TRAMADOL HYDROCHLORIDE 50 MG/1
50 TABLET ORAL 2 TIMES DAILY
COMMUNITY
Start: 2019-07-25

## 2019-10-01 RX ORDER — ZOSTER VACCINE RECOMBINANT, ADJUVANTED 50 MCG/0.5
KIT INTRAMUSCULAR
COMMUNITY
Start: 2019-08-23 | End: 2020-01-08 | Stop reason: ALTCHOICE

## 2019-10-01 NOTE — PROGRESS NOTES
Subjective:       Patient ID: Mich Knight is a 59 y.o. female.    Chief Complaint: Follow-up ((3 months) pt wants recomendation for right leg pain) and Other (pt states cyst on right knee was found on mri last week)    Patient with type 2 diabetes mellitus, chronic hepatitis C which has been untreated, and chronic pain secondary to lumbar disc disease and arthritis of the right knee presents for routine follow-up.  Patient is currently seeing Pain Management for her chronic low back pain. She recently had MRI of the knee and is requesting orthopedic referral.  Regarding the chronic hepatitis-C  Patient was seen once by hepatology but did not follow-up.   Regarding her diabetes she does not monitor her blood sugar.  A1c done last week was 6.9.  She is due for eye exam.   Patients patient medical/surgical, social and family histories have been reviewed       Review of Systems   Constitutional: Positive for activity change. Negative for unexpected weight change.   HENT: Negative for hearing loss, rhinorrhea and trouble swallowing.    Eyes: Positive for visual disturbance. Negative for discharge.   Respiratory: Negative for chest tightness and wheezing.    Cardiovascular: Negative for chest pain and palpitations.   Gastrointestinal: Negative for blood in stool, constipation, diarrhea and vomiting.   Endocrine: Negative for polydipsia and polyuria.   Genitourinary: Negative for difficulty urinating, dysuria, hematuria and menstrual problem.   Musculoskeletal: Positive for arthralgias. Negative for joint swelling and neck pain.   Neurological: Positive for weakness. Negative for headaches.   Psychiatric/Behavioral: Negative for confusion and dysphoric mood.       Objective:      Physical Exam   Constitutional: She appears well-developed and well-nourished. She is cooperative. No distress.   HENT:   Head: Normocephalic and atraumatic.   Cardiovascular: Normal rate, regular rhythm and normal heart sounds.    Pulmonary/Chest: Effort normal and breath sounds normal.   Musculoskeletal:        Right lower leg: She exhibits no edema.        Left lower leg: She exhibits no edema.   Neurological: She is alert.   Skin: Skin is warm and dry.       Assessment:       1. Type 2 diabetes mellitus with complication, without long-term current use of insulin    2. Chronic pain of right knee    3. Hep C w/o coma, chronic        Plan:       Mich was seen today for follow-up and other.    Diagnoses and all orders for this visit:    Type 2 diabetes mellitus with complication, without long-term current use of insulin  -     Ambulatory referral to Optometry    Chronic pain of right knee  -     Ambulatory referral/consult to Orthopedics; Future    Hep C w/o coma, chronic  -     Cancel: Ambulatory Referral to Hepatitis C  -     Ambulatory Referral to Hepatology           Follow up in about 3 months (around 1/1/2020) for Follow-Up with PCP.   DISCLAIMER: This note was prepared with Quat-E voice recognition transcription software. Garbled syntax, mangled pronouns, and other bizarre constructions may be attributed to that software system   Patient readiness: acceptance and barriers:social stressors    During the course of the visit the patient was educated and counseled about the following:     Diabetes:  Reminded to bring in blood sugar diary at next visit.    Goals: Diabetes: Maintain Hemoglobin A1C below 7    Did patient meet goals/outcomes: Yes    The following self management tools provided: blood glucose log    Patient Instructions (the written plan) was given to the patient/family.     Time spent with patient: 30 minutes    Barriers to medications present (yes )    Adverse reactions to current medications (no)    Over the counter medications reviewed (Yes)

## 2019-10-01 NOTE — LETTER
October 1, 2019      LENORA Kwan  [address] [place] [address]           [address]  [facility] [place] [address]  Phone: [phone]          Patient: Mich Knight   MR Number: [id number]   YOB: 1960   Date of Visit: 10/1/2019       Dear Laya Bonilla:    Thank you for referring Mich Knight to me for evaluation. Attached you will find relevant portions of my assessment and plan of care.    If you have questions, please do not hesitate to call me. I look forward to following Mich Knight along with you.    Sincerely,    Reynaldo Mendes II, MD    Enclosure  CC:  No Recipients    If you would like to receive this communication electronically, please contact [email or web address] or [phone] to request more information on [facility] Link access.    For providers and/or their staff who would like to refer a patient to [facility], please contact us through our one-stop-shop provider referral line, [facility] , at [phone].    If you feel you have received this communication in error or would no longer like to receive these types of communications, please e-mail [email or web address]

## 2019-10-01 NOTE — PROGRESS NOTES
CC:  59-year-old female presents for evaluation of chronic right knee pain. The patient actually states she has pain that radiates up and down her right leg from the foot up to the knee and into the thigh.  She can't tell for pains coming from the hip and knee area down to the foot or vice versa.  She is currently seeing someone at the spine center.  She had MRIs of her knee and her spine done recently and they are available for review.    ROS:    Constitution: Denies chills, fever, and sweats.  HENT: Denies headaches or blurry vision.  Cardiovascular: Denies chest pain or irregular heart beat.  Respiratory: Denies cough or shortness of breath.  Gastrointestinal: Denies abdominal pain, nausea, or vomiting.  Genitourinary:  Denies urinary incontinence, bladder and kidney issues  Musculoskeletal:  Denies muscle cramps.  Positive for pain in the right leg that radiates down to the foot and ankle  Neurological: Denies dizziness or focal weakness.  Psychiatric/Behavioral: Normal mental status.  Hematologic/Lymphatic: Denies bleeding problem or easy bruising/bleeding.  Skin: Denies rash or suspicious lesions.    Physical examination     Gen - No acute distress   Eyes - Extraoccular motions intact, pupils equally round and reactive to light and accommodation   ENT - normocephalic, atruamtic, oropharynx clear   Neck - Supple, no abnormal masses   Cardiovascular - regular rate and rhythm   Pulmonary - clear to auscultation bilaterally   Abdomen - soft, non-tender, non-distended, positive bowel sounds   Psych - The patient is alert and oriented x3 with normal mood and affect    Examination of the Right Lower Extremity:     Motor function is intact distally EHL/FHL/TA/franck   +2 dorsalis pedis and posterior tibial pulses   Sensation to light touch intact distally dorsal, plantar, and first web space     Examination of the Right knee:    ROM 0 - 150   Effusion negative  Tenderness to palpation at the joint line negative  Pain  during range of motion negative  Crepitation during range of motion negative     negative increased pain noted with flexion past 90   positive antalgic gait noted   negative Lachman's Test   negative Anterior Drawer Test   negative Posterior Drawer Test   negative McMurrays Test   negative Disco Test   negative Varus/Valgus instability    X-rays were examined and personally reviewed by me.  Two views of the right tibia and fibula dated 05/31/2019 are available for review.  They show the knee joint has well-maintained joint space with no acute fractures and no advanced osteoarthritic changes.    MRI images were examined and personally reviewed by me.  An MRI dated 09/23/2019 shows small tricompartmental marginal osteophytes along with chondral fibrillation and an area of full-thickness articular cartilage loss in the medial patellar facet.  MRI of the lumbar spine dated 08/09/2019 is available for review.  There is severe right-sided foraminal stenosis at L5-S1.    Dx:  Minimal Osteoarthritis of the right knee, best seen on MRI.  Severe right-sided foraminal stenosis likely the cause of the patient's underlying pain.    Plan:  I had a long talk with the patient about the exam findings and the MRIs.  Do not see anything on the knee MRI that would account for the type of pain she is having.  She does have some severe right-sided foraminal stenosis that could account for this kind of pain.  She is already being seen at the spine center.  She should follow up with them for her radicular pain.

## 2019-10-02 ENCOUNTER — TELEPHONE (OUTPATIENT)
Dept: FAMILY MEDICINE | Facility: CLINIC | Age: 59
End: 2019-10-02

## 2019-10-02 NOTE — TELEPHONE ENCOUNTER
Was walking cane order for the patient ? I do not see any order. Checked with Dr. Mendes's staff and they advised me that they didn't order it as well. Please advise.

## 2019-10-02 NOTE — TELEPHONE ENCOUNTER
Spoke to [facility], service rep advised me that patient called them today requesting for walking cane. They just need order/office note/medical necessity faxed over to them.    Advised service rep that I will send message over to provider regarding this matter.

## 2019-10-02 NOTE — TELEPHONE ENCOUNTER
----- Message from Uma Cleary sent at 10/2/2019  1:51 PM CDT -----  Type: Needs Medical Advice    Who Called:  Urszula with Member Services Dept at [employer] [place]  Symptoms (please be specific):  na  How long has patient had these symptoms:  na  Pharmacy name and phone #:  [email or web address]  Best Call Back Number: [phone]  Additional Information: fax [phone] /please fax clinical notes/medical necessity form and signed physicians order for a walking cane

## 2019-10-03 ENCOUNTER — TELEPHONE (OUTPATIENT)
Dept: HEPATOLOGY | Facility: CLINIC | Age: 59
End: 2019-10-03

## 2019-10-03 NOTE — TELEPHONE ENCOUNTER
----- Message from Franny Navarro MA sent at 10/1/2019  2:22 PM CDT -----      ----- Message -----  From: Annette Marrero  Sent: 10/1/2019   1:15 PM CDT  To: [facility] Staff    Pt was seen today and needs an appt for Hep C w/o coma, chronic  Please call her @ [phone]  Thanks !

## 2019-10-04 ENCOUNTER — TELEPHONE (OUTPATIENT)
Dept: FAMILY MEDICINE | Facility: CLINIC | Age: 59
End: 2019-10-04

## 2019-10-04 DIAGNOSIS — G89.29 CHRONIC PAIN OF RIGHT KNEE: Primary | ICD-10-CM

## 2019-10-04 DIAGNOSIS — M25.561 CHRONIC PAIN OF RIGHT KNEE: Primary | ICD-10-CM

## 2019-10-10 ENCOUNTER — TELEPHONE (OUTPATIENT)
Dept: HEPATOLOGY | Facility: CLINIC | Age: 59
End: 2019-10-10

## 2019-10-10 NOTE — TELEPHONE ENCOUNTER
Patient already followed by PA Scheuermann in the hepatology department.  She missed f/u appt with US on 8/20/2019.  Attempt made to reach her by phone for rescheduling.  Unable to LVM. Letter sent asking asking that she contact us for rescheduling.

## 2019-10-10 NOTE — TELEPHONE ENCOUNTER
----- Message from Kimberly Moreno sent at 10/10/2019 11:36 AM CDT -----      ----- Message -----  From: Jael Norton  Sent: 10/10/2019  10:17 AM CDT  To: Hepatology Scheduling    JASS CARRILLO Provider: LENORA Kwan  Diagnosis: Hep C w/o coma, chronic

## 2019-12-17 ENCOUNTER — HOSPITAL ENCOUNTER (OUTPATIENT)
Dept: RADIOLOGY | Facility: HOSPITAL | Age: 59
Discharge: HOME | End: 2019-12-17
Attending: PHYSICIAN ASSISTANT
Payer: MEDICARE

## 2019-12-17 ENCOUNTER — OFFICE VISIT (OUTPATIENT)
Dept: HEPATOLOGY | Facility: CLINIC | Age: 59
End: 2019-12-17
Payer: MEDICARE

## 2019-12-17 VITALS
HEART RATE: 96 BPM | SYSTOLIC BLOOD PRESSURE: 129 MMHG | BODY MASS INDEX: 26.05 KG/M2 | DIASTOLIC BLOOD PRESSURE: 93 MMHG | HEIGHT: 62 IN | WEIGHT: 141.56 LBS

## 2019-12-17 DIAGNOSIS — B18.2 CHRONIC HEPATITIS C WITHOUT HEPATIC COMA: Primary | ICD-10-CM

## 2019-12-17 DIAGNOSIS — B18.2 CHRONIC HEPATITIS C WITHOUT HEPATIC COMA: ICD-10-CM

## 2019-12-17 PROCEDURE — 3074F PR MOST RECENT SYSTOLIC BLOOD PRESSURE < 130 MM HG: ICD-10-PCS | Mod: CPTII,S$GLB,, | Performed by: PHYSICIAN ASSISTANT

## 2019-12-17 PROCEDURE — 99213 PR OFFICE/OUTPT VISIT, EST, LEVL III, 20-29 MIN: ICD-10-PCS | Mod: S$GLB,,, | Performed by: PHYSICIAN ASSISTANT

## 2019-12-17 PROCEDURE — 3080F DIAST BP >= 90 MM HG: CPT | Mod: CPTII,S$GLB,, | Performed by: PHYSICIAN ASSISTANT

## 2019-12-17 PROCEDURE — 3008F BODY MASS INDEX DOCD: CPT | Mod: CPTII,S$GLB,, | Performed by: PHYSICIAN ASSISTANT

## 2019-12-17 PROCEDURE — 3074F SYST BP LT 130 MM HG: CPT | Mod: CPTII,S$GLB,, | Performed by: PHYSICIAN ASSISTANT

## 2019-12-17 PROCEDURE — 3080F PR MOST RECENT DIASTOLIC BLOOD PRESSURE >= 90 MM HG: ICD-10-PCS | Mod: CPTII,S$GLB,, | Performed by: PHYSICIAN ASSISTANT

## 2019-12-17 PROCEDURE — 99999 PR PBB SHADOW E&M-EST. PATIENT-LVL III: CPT | Mod: PBBFAC,,, | Performed by: PHYSICIAN ASSISTANT

## 2019-12-17 PROCEDURE — 99999 PR PBB SHADOW E&M-EST. PATIENT-LVL III: ICD-10-PCS | Mod: PBBFAC,,, | Performed by: PHYSICIAN ASSISTANT

## 2019-12-17 PROCEDURE — 3008F PR BODY MASS INDEX (BMI) DOCUMENTED: ICD-10-PCS | Mod: CPTII,S$GLB,, | Performed by: PHYSICIAN ASSISTANT

## 2019-12-17 PROCEDURE — 99213 OFFICE O/P EST LOW 20 MIN: CPT | Mod: S$GLB,,, | Performed by: PHYSICIAN ASSISTANT

## 2019-12-17 PROCEDURE — 76700 US EXAM ABDOM COMPLETE: CPT | Mod: TC,PO

## 2019-12-17 RX ORDER — VELPATASVIR AND SOFOSBUVIR 100; 400 MG/1; MG/1
1 TABLET, FILM COATED ORAL DAILY
Qty: 28 TABLET | Refills: 2 | Status: SHIPPED | OUTPATIENT
Start: 2019-12-17 | End: 2020-06-01

## 2019-12-17 NOTE — PROGRESS NOTES
"HEPATOLOGY CLINIC VISIT NOTE - HCV clinic    CHIEF COMPLAINT: Hepatitis C     HISTORY: This is a 59 y.o. White female with chronic hepatitis C    Here for f/u w/ additional labs, imaging, liver staging.   Lacking immunity to HAV & HBV   No evidence of advanced fibrosis.    Feels well  Motivated to have HCV treated  Denies jaundice, dark urine, hematemesis, melena, slowed mentation, abdominal distention.       HCV history:  Originally diagnosed about a year ago but recalls she used to be a regular blood donor until 2010 when she was told she could no longer due to "something in blood"  Risks for HCV:  tattoo placement: first one 1989    Denies blood transfusions, IVDA, intranasal drug use  - Treatment naive  - Genotype 2b  - HCV RNA >15 million IU/mL - 6/2019    Liver staging:  HCV FibroSURE 6/2019 - A0, F0-1  U/S today pending    Labs reveal well preserved liver function  Labs 3/2019:   FIB-4 - 1.04 (cirrhosis less likely)   APRI - 0.22 (Cirrhosis less likely                      Past Medical History:   Diagnosis Date    Cataract     OU    DDD (degenerative disc disease), lumbar     Diabetes mellitus     Essential tremor     Hepatitis C     HLD (hyperlipidemia)     HTN (hypertension)        Past Surgical History:   Procedure Laterality Date    c-sec  1990    CHOLECYSTECTOMY      INJECTION OF ANESTHETIC AGENT AROUND MEDIAL BRANCH NERVES INNERVATING LUMBAR FACET JOINT Bilateral 7/10/2018    Procedure: Block-nerve-medial branch-lumbar;  Surgeon: Charles Knowles MD;  Location: [facility] OR;  Service: Pain Management;  Laterality: Bilateral;  L2,3,4,5    RADIOFREQUENCY ABLATION OF LUMBAR MEDIAL BRANCH NERVE AT SINGLE LEVEL Bilateral 7/27/2018    Procedure: RADIOFREQUENCY ABLATION, NERVE, MEDIAL BRANCH, LUMBAR, 1 LEVEL;  Surgeon: Charles Knowles MD;  Location: [facility] OR;  Service: Pain Management;  Laterality: Bilateral;  L2,3,4,5 - Burned at 80 degrees C. for 75 seconds x 2 each site    RADIOFREQUENCY ABLATION OF LUMBAR " MEDIAL BRANCH NERVE AT SINGLE LEVEL Bilateral 2/7/2019    Procedure: Radiofrequency Ablation, Nerve, Spinal, Lumbar, Medial Branch, L2,3,4,5;  Surgeon: Charles Knowles MD;  Location: [facility] OR;  Service: Pain Management;  Laterality: Bilateral;  lumbar  JenniferBRAND-YOURSELFRickie pain management generator   [id number]  80 degrees for 75 seconds x2    TRANSFORAMINAL EPIDURAL INJECTION OF STEROID Right 4/8/2019    Procedure: Injection,steroid,epidural,transforaminal approach L4-5, L5-S1;  Surgeon: Charles Knowles MD;  Location: [facility] OR;  Service: Pain Management;  Laterality: Right;       FAMILY HISTORY: Negative for liver disease    SOCIAL HISTORY:   Social History     Tobacco Use   Smoking Status Current Every Day Smoker    Packs/day: 0.50    Years: 40.00    Pack years: 20.00   Smokeless Tobacco Never Used     Alcohol - 4-5 beers once a week now for past 13 yrs. Describes 20 year period of drinking heavier in the past  Drugs - denies      ROS:   No fever, chills, weight loss, fatigue  No chest pain, palpitations, dyspnea, cough  No abdominal pain, nausea, vomiting, GERD  No skin rashes   No headaches  (+) back and right leg pain  No lower extremity edema  No depression or anxiety      PHYSICAL EXAM:  Friendly White female, in no acute distress; alert and oriented to person, place and time (+) resting tremor noted  VITALS: reviewed  HEENT: Sclerae anicteric.   NECK: Supple  LUNGS: Normal respiratory effort.  ABDOMEN: Flat, soft, nontender.   SKIN: Warm and dry. No jaundice, No obvious rashes.   NEURO/PSYCH: Normal gate. Memory intact. Thought and speech pattern appropriate. Behavior normal. No depression or anxiety noted.    RECENT LABS:  Lab Results   Component Value Date    WBC 8.50 06/18/2019    HGB 11.8 (L) 06/18/2019     06/18/2019     Lab Results   Component Value Date    INR 1.0 06/18/2019     Lab Results   Component Value Date    AST 22 09/27/2019    ALT 19 09/27/2019    BILITOT 0.4 09/27/2019    ALBUMIN 3.5  09/27/2019    ALKPHOS 51 (L) 09/27/2019    CREATININE 0.9 09/27/2019    BUN 12 09/27/2019     09/27/2019    K 4.2 09/27/2019 2/23/2018 11:55   Hep B Core Total Ab Negative   Hep B S Ab Negative   Hepatitis B Surface Ag Negative       RECENT IMAGING:  U/s today pending    ASSESSMENT  59 y.o. White female with:  1. CHRONIC HEPATITIS C, GENOTYPE 2b - treatment naive  -- HCV FibroSURE 6/2019 - A0, F0-1  -- Lacking immunity to HAV & HBV infection      EDUCATION:  Discussed goal of HCV eradication to prevent progression of liver disease.  Discussed use of Epclusa daily x 12 weeks w/ potential side effects of fatigue and headache.     Reviewed limitations on acid suppressant medications due to DDI w/ Epclusa:  -- Antacids, H2 Receptor Antagonist, PPI - Pt not taking  Patient instructed to contact me if experiencing acid related symptoms so further recommendations can be made regarding acid suppression therapy.      Herbal / alternative therapies must be discontinued  Discussed importance of medication adherence and risk of treatment failure / viral resistance if not adherent. Pt has verbalized understanding.    **Pt would prefer treatment that is not linked to meals      PLAN:  1. Await u/s results  2. Obtain authorization to treat HCV with Epclusa x 12 weeks  -- Rx will be routed to [facility]  -- Patient will notify me of exact treatment start date so appropriate lab f/u can be scheduled.  3. Twinrix recommended. Declined by pt.

## 2019-12-18 ENCOUNTER — PATIENT MESSAGE (OUTPATIENT)
Dept: PHARMACY | Facility: CLINIC | Age: 59
End: 2019-12-18

## 2019-12-26 ENCOUNTER — TELEPHONE (OUTPATIENT)
Dept: PHARMACY | Facility: CLINIC | Age: 59
End: 2019-12-26

## 2019-12-26 NOTE — TELEPHONE ENCOUNTER
DOCUMENTATION ONLY:  Prior authorization for Epclusa approved from 12/24/2019 to 3/25/2020 x 12 weeks of treatment.   Case ID# [id number]    Co-pay: $0    Patient Assistance IS NOT required.     Forward to clinical pharmacist for consult & shipment.

## 2019-12-30 ENCOUNTER — TELEPHONE (OUTPATIENT)
Dept: PHARMACY | Facility: CLINIC | Age: 59
End: 2019-12-30

## 2019-12-30 NOTE — TELEPHONE ENCOUNTER
Initial Epclusa consult completed on . Epclusa will be shipped on  to arrive at patient's home on 1/3 via Fed[employer]. $0.00 copay. Patient will start Epclusa on . Address confirmed. Confirmed 2 patient identifiers - name and . Therapy Appropriate.     Epclusa 400/100mg- Take one tablet by mouth daily x 12 weeks  Counseling was reviewed:   1. Patient MUST take Epclusa at the SAME time every day.   2. Patient MUST avoid acid reducers without consulting with myself or provider first. Antacids are to be spaced out at least 4 hours apart from Epclusa. Patient reports that she does not suffer from heartburn/upset stomach/indigestion. Patient plans to avoid acid suppressants.    3. Potential Side effects include: headaches and fatigue.   Headache: Patient may treat with OTC remedies. If Tylenol is used, dose should not exceed 2000mg per day.    4. Medication list reviewed. No DDIs or allergies noted. Patient MUST contact myself or provider prior to starting any new OTC, herbal, or prescription drugs to avoid potential DDIs.    DDI: Medication list reviewed and potential DDIs addressed.    Discussed the importance of staying well hydrated while on therapy. Compliance stressed - patient to take missed doses as soon as remembered, but NOT to take 2 doses in one day. Patient will report questions or concerns to myself or practitioner. Patient verbalizes understanding. Patient plans to start Epclusa on .  Consultation included: indication; goals of treatment; administration; storage and handling; side effects; how to handle side effects; the importance of compliance; how to handle missed doses; the importance of laboratory monitoring; the importance of keeping all follow up appointments.  Patient understands to report any medication changes to [facility] and provider. All questions answered and addressed to patients satisfaction. I will f/u with her in 1 week from start, [facility] to contact patient in 3 weeks for refills.

## 2020-01-02 ENCOUNTER — DOCUMENTATION ONLY (OUTPATIENT)
Dept: FAMILY MEDICINE | Facility: CLINIC | Age: 60
End: 2020-01-02

## 2020-01-02 ENCOUNTER — TELEPHONE (OUTPATIENT)
Dept: HEPATOLOGY | Facility: CLINIC | Age: 60
End: 2020-01-02

## 2020-01-02 DIAGNOSIS — B18.2 CHRONIC HEPATITIS C WITHOUT HEPATIC COMA: Primary | ICD-10-CM

## 2020-01-02 NOTE — PROGRESS NOTES
Pre-Visit Chart Review  For Appointment Scheduled on 1/8/2020    Health Maintenance Due   Topic Date Due    TETANUS VACCINE  07/13/1979

## 2020-01-02 NOTE — TELEPHONE ENCOUNTER
Pt beginning 12 weeks of Epclusa on 1/4/20  Krystyna 2b, tx naive  F0    Pls schedule:  - HCV RNA at week 6 - 2/14/20  - CMP, HCV RNA - SVR12 - 6/20/20

## 2020-01-03 ENCOUNTER — EPISODE CHANGES (OUTPATIENT)
Dept: HEPATOLOGY | Facility: CLINIC | Age: 60
End: 2020-01-03

## 2020-01-03 NOTE — TELEPHONE ENCOUNTER
Attempt made to reach patient. Unable to LVM.  Msg from provider mailed to her.  Lab scheduled; appt notices mailed.

## 2020-01-08 ENCOUNTER — OFFICE VISIT (OUTPATIENT)
Dept: FAMILY MEDICINE | Facility: CLINIC | Age: 60
End: 2020-01-08
Payer: MEDICARE

## 2020-01-08 VITALS
BODY MASS INDEX: 26.33 KG/M2 | SYSTOLIC BLOOD PRESSURE: 132 MMHG | WEIGHT: 143.06 LBS | HEIGHT: 62 IN | HEART RATE: 80 BPM | OXYGEN SATURATION: 98 % | DIASTOLIC BLOOD PRESSURE: 64 MMHG | TEMPERATURE: 98 F

## 2020-01-08 DIAGNOSIS — E78.5 HYPERLIPIDEMIA, UNSPECIFIED HYPERLIPIDEMIA TYPE: ICD-10-CM

## 2020-01-08 DIAGNOSIS — E11.9 TYPE 2 DIABETES MELLITUS WITHOUT COMPLICATION, WITHOUT LONG-TERM CURRENT USE OF INSULIN: Primary | ICD-10-CM

## 2020-01-08 DIAGNOSIS — E11.59 HYPERTENSION ASSOCIATED WITH DIABETES: ICD-10-CM

## 2020-01-08 DIAGNOSIS — I15.2 HYPERTENSION ASSOCIATED WITH DIABETES: ICD-10-CM

## 2020-01-08 PROCEDURE — 3044F HG A1C LEVEL LT 7.0%: CPT | Mod: CPTII,S$GLB,, | Performed by: FAMILY MEDICINE

## 2020-01-08 PROCEDURE — 3044F PR MOST RECENT HEMOGLOBIN A1C LEVEL <7.0%: ICD-10-PCS | Mod: CPTII,S$GLB,, | Performed by: FAMILY MEDICINE

## 2020-01-08 PROCEDURE — 3078F DIAST BP <80 MM HG: CPT | Mod: CPTII,S$GLB,, | Performed by: FAMILY MEDICINE

## 2020-01-08 PROCEDURE — 99499 RISK ADDL DX/OHS AUDIT: ICD-10-PCS | Mod: S$GLB,,, | Performed by: FAMILY MEDICINE

## 2020-01-08 PROCEDURE — 3008F BODY MASS INDEX DOCD: CPT | Mod: CPTII,S$GLB,, | Performed by: FAMILY MEDICINE

## 2020-01-08 PROCEDURE — 99214 PR OFFICE/OUTPT VISIT, EST, LEVL IV, 30-39 MIN: ICD-10-PCS | Mod: S$GLB,,, | Performed by: FAMILY MEDICINE

## 2020-01-08 PROCEDURE — 99999 PR PBB SHADOW E&M-EST. PATIENT-LVL IV: ICD-10-PCS | Mod: PBBFAC,,, | Performed by: FAMILY MEDICINE

## 2020-01-08 PROCEDURE — 3075F SYST BP GE 130 - 139MM HG: CPT | Mod: CPTII,S$GLB,, | Performed by: FAMILY MEDICINE

## 2020-01-08 PROCEDURE — 99214 OFFICE O/P EST MOD 30 MIN: CPT | Mod: S$GLB,,, | Performed by: FAMILY MEDICINE

## 2020-01-08 PROCEDURE — 99499 UNLISTED E&M SERVICE: CPT | Mod: S$GLB,,, | Performed by: FAMILY MEDICINE

## 2020-01-08 PROCEDURE — 3078F PR MOST RECENT DIASTOLIC BLOOD PRESSURE < 80 MM HG: ICD-10-PCS | Mod: CPTII,S$GLB,, | Performed by: FAMILY MEDICINE

## 2020-01-08 PROCEDURE — 3008F PR BODY MASS INDEX (BMI) DOCUMENTED: ICD-10-PCS | Mod: CPTII,S$GLB,, | Performed by: FAMILY MEDICINE

## 2020-01-08 PROCEDURE — 99999 PR PBB SHADOW E&M-EST. PATIENT-LVL IV: CPT | Mod: PBBFAC,,, | Performed by: FAMILY MEDICINE

## 2020-01-08 PROCEDURE — 3075F PR MOST RECENT SYSTOLIC BLOOD PRESS GE 130-139MM HG: ICD-10-PCS | Mod: CPTII,S$GLB,, | Performed by: FAMILY MEDICINE

## 2020-01-08 NOTE — PROGRESS NOTES
Subjective:   Patient ID: Mich Knight is a 59 y.o. female     Chief Complaint:Follow-up (3 months)      Patient hypertension which currently well controlled on oral medication.  Patient type 2 diabetes currently controlled.  Patient with hyperlipidemia currently stable diet.    Review of Systems   Respiratory: Negative for shortness of breath.    Gastrointestinal: Negative for abdominal pain.   Genitourinary: Negative for dysuria.     Past Medical History:   Diagnosis Date    Cataract     OU    DDD (degenerative disc disease), lumbar     Diabetes mellitus     Essential tremor     Hepatitis C     HLD (hyperlipidemia)     HTN (hypertension)      Objective:     Vitals:    01/08/20 0929   BP: 132/64   Pulse:    Temp:      Body mass index is 26.17 kg/m².  Physical Exam   Neck: Neck supple. No tracheal deviation present.   Cardiovascular: Normal rate, regular rhythm and normal heart sounds.   Pulmonary/Chest: Effort normal. No respiratory distress.   Musculoskeletal: Normal range of motion. She exhibits no edema.     Assessment:     1. Type 2 diabetes mellitus without complication, without long-term current use of insulin    2. Hyperlipidemia, unspecified hyperlipidemia type    3. Hypertension associated with diabetes      Plan:   Type 2 diabetes mellitus without complication, without long-term current use of insulin  -     Hemoglobin A1c; Future; Expected date: 07/08/2020  Reviewed blood work from September 2019 shows an A1c of 6.9 which is at goal for patient.  Will continue monitor this regularly.    Hyperlipidemia, unspecified hyperlipidemia type  -     Lipid panel; Future; Expected date: 07/08/2020  Patient hyperlipidemia currently stable with diet exercise.  Reviewed blood work from March 2019 shows an LDL cholesterol 112 which is not quite recall the within acceptable range patient's risk factors.  Will continue monitor regularly.  Goal for patient be less than 100.    Hypertension associated with  diabetes  -     Comprehensive metabolic panel; Future; Expected date: 07/08/2020  Patient hypertension well controlled today on oral medication.  Reviewed blood work from September 2019 shows no signs end organ damage such as kidney disease indicating controlled BP has reduced patients risk of hypertensive comorbidity      Time spent with patient: 15 minutes and over half of that time was spent on counseling an coordination of care.    Huang Nicholson MD  01/08/2020    Portions of this note have been dictated with NADEEM Sims

## 2020-01-24 ENCOUNTER — TELEPHONE (OUTPATIENT)
Dept: PHARMACY | Facility: CLINIC | Age: 60
End: 2020-01-24

## 2020-01-24 NOTE — TELEPHONE ENCOUNTER
Epclusa (2 of 3)  refill confirmed and reassessment complete. We will ship Epclusa refill on  via fedex to arrive on . $8.95 copay- 004 (a/r). Confirmed 2 patient identifiers - name and . Therapy appropriate.     Patient has 7 doses of Epclusa remaining and takes it around 9 AM daily.  Pt reports they are not having any side effects so far. No missed doses, no new medications, no new allergies or health conditions reported at this time. Allergies reviewed and medication reconciliation complete (reviewed and documented in [facility] and [facility]).  Disease education reviewed (including transmission and prevention). Patient counseled on importance of maintaining adherence and keeping lab appointments which were scheduled. All questions answered and addressed to patients satisfaction. Advised to call [facility] and provider if any issues arise.  Pt verbalized understanding.

## 2020-02-04 DIAGNOSIS — E11.65 UNCONTROLLED TYPE 2 DIABETES MELLITUS WITH HYPERGLYCEMIA: ICD-10-CM

## 2020-02-04 NOTE — TELEPHONE ENCOUNTER
----- Message from Teri Chopra sent at 2/4/2020  3:09 PM CST -----  Type:  RX Refill Request    Who Called:  Patient  RX Name and Strength: Trulicity  Preferred Pharmacy with phone number:  [facility] [address] [place]  Best Call Back Number:  [phone]  Additional Information:

## 2020-02-06 ENCOUNTER — TELEPHONE (OUTPATIENT)
Dept: FAMILY MEDICINE | Facility: CLINIC | Age: 60
End: 2020-02-06

## 2020-02-06 NOTE — TELEPHONE ENCOUNTER
----- Message from Russel Duarte sent at 2/6/2020 12:47 PM CST -----  Type:  RX Refill Request    Who Called: self Refill or New Rx:  Refill   RX Name and Strength:  dulaglutide (TRULICITY) 0.75 mg/0.5 mL PnIj  How is the patient currently taking it? (ex. 1XDay):    Is this a 30 day or 90 day RX: 30 day supply  Preferred Pharmacy with phone number:  [facility] [address]  Local or Mail Order: local   Ordering Provider:  Dr Nicholson   Best Call Back Number:    Additional Information:  Patient called request rx refill on 02/04/2020. The pharmacy has not received the rx. Patient states she doesn't have a phone.

## 2020-02-14 ENCOUNTER — LAB VISIT (OUTPATIENT)
Dept: LAB | Facility: HOSPITAL | Age: 60
End: 2020-02-14
Attending: PHYSICIAN ASSISTANT
Payer: MEDICARE

## 2020-02-14 DIAGNOSIS — B18.2 CHRONIC HEPATITIS C WITHOUT HEPATIC COMA: ICD-10-CM

## 2020-02-14 PROCEDURE — 36415 COLL VENOUS BLD VENIPUNCTURE: CPT

## 2020-02-14 PROCEDURE — 87522 HEPATITIS C REVRS TRNSCRPJ: CPT

## 2020-02-16 LAB
HCV PCR QNT TEST INFORMATION: NORMAL
HCV RNA SERPL NAA+PROBE-ACNC: NORMAL IU/ML

## 2020-02-18 ENCOUNTER — TELEPHONE (OUTPATIENT)
Dept: PHARMACY | Facility: CLINIC | Age: 60
End: 2020-02-18

## 2020-02-18 NOTE — TELEPHONE ENCOUNTER
Epclusa refill (3 of 3) confirmed and reassessment complete. We will ship Epclusa refill on  via fedex to arrive on . $0.00 copay- 004. Confirmed 2 patient identifiers - name and . Therapy appropriate.     Patient has 10 doses of Epclusa remaining and takes it around 9:00 am daily. Pt reports they are not having any side effects so far. No missed doses, no new medications, no new allergies or health conditions reported at this time. Allergies reviewed and medication reconciliation complete (reviewed and documented in [facility] and [facility]).  Disease education reviewed (including transmission and prevention). Patient counseled on importance of maintaining adherence and keeping lab appointments which were scheduled. All questions answered and addressed to patients satisfaction. Advised to call [facility] and provider if any issues arise.  Pt verbalized understanding.

## 2020-02-19 ENCOUNTER — EPISODE CHANGES (OUTPATIENT)
Dept: HEPATOLOGY | Facility: CLINIC | Age: 60
End: 2020-02-19

## 2020-03-02 NOTE — TELEPHONE ENCOUNTER
Advised pt via portal.   
No I do not prescribe tramadol for chronic pain. Patient will need to follow with Dr. Knowles or another pain management doctor.  I would recommend taking Tylenol to assist with pain. Another option would be taking either ibuprofen or Aleve.  
Patient already follows with Dr. Knowles.  I will place an external referral to pain management patient will have to contact her insurance company to find 1 in the area that is approved.  
Please advise on portal message.   
02-Mar-2020 13:02

## 2020-03-10 ENCOUNTER — EPISODE CHANGES (OUTPATIENT)
Dept: HEPATOLOGY | Facility: CLINIC | Age: 60
End: 2020-03-10

## 2020-03-28 ENCOUNTER — PATIENT MESSAGE (OUTPATIENT)
Dept: FAMILY MEDICINE | Facility: CLINIC | Age: 60
End: 2020-03-28

## 2020-03-31 ENCOUNTER — PATIENT MESSAGE (OUTPATIENT)
Dept: FAMILY MEDICINE | Facility: CLINIC | Age: 60
End: 2020-03-31

## 2020-03-31 ENCOUNTER — PATIENT MESSAGE (OUTPATIENT)
Dept: OPTOMETRY | Facility: CLINIC | Age: 60
End: 2020-03-31

## 2020-04-01 ENCOUNTER — PATIENT MESSAGE (OUTPATIENT)
Dept: FAMILY MEDICINE | Facility: CLINIC | Age: 60
End: 2020-04-01

## 2020-05-05 ENCOUNTER — PATIENT MESSAGE (OUTPATIENT)
Dept: ADMINISTRATIVE | Facility: HOSPITAL | Age: 60
End: 2020-05-05

## 2020-05-06 RX ORDER — LOSARTAN POTASSIUM 50 MG/1
50 TABLET ORAL DAILY
Qty: 90 TABLET | Refills: 3 | Status: SHIPPED | OUTPATIENT
Start: 2020-05-06 | End: 2020-07-08

## 2020-05-12 LAB
LEFT EYE DM RETINOPATHY: POSITIVE
RIGHT EYE DM RETINOPATHY: POSITIVE

## 2020-05-13 ENCOUNTER — TELEPHONE (OUTPATIENT)
Dept: FAMILY MEDICINE | Facility: CLINIC | Age: 60
End: 2020-05-13

## 2020-05-13 NOTE — TELEPHONE ENCOUNTER
PT drops a form for cataract surgery clearance. I called pt to schedule appt to get the clearance. LM pt call clinic to schedule appt

## 2020-05-13 NOTE — TELEPHONE ENCOUNTER
----- Message from Lianna Luo sent at 5/13/2020 11:22 AM CDT -----  Contact: Mich pt  Type:  Patient Returning Call    Who Called:  Mcih  Who Left Message for Patient:  Ro  Does the patient know what this is regarding?:  Needs a clearance   Best Call Back Number:  [phone]  Additional Information:  Pls call pt back to adv

## 2020-05-15 DIAGNOSIS — Z12.11 COLON CANCER SCREENING: ICD-10-CM

## 2020-06-01 ENCOUNTER — OFFICE VISIT (OUTPATIENT)
Dept: FAMILY MEDICINE | Facility: CLINIC | Age: 60
End: 2020-06-01
Payer: MEDICARE

## 2020-06-01 ENCOUNTER — LAB VISIT (OUTPATIENT)
Dept: LAB | Facility: HOSPITAL | Age: 60
End: 2020-06-01
Attending: PHYSICIAN ASSISTANT
Payer: MEDICARE

## 2020-06-01 VITALS
BODY MASS INDEX: 26.53 KG/M2 | WEIGHT: 144.19 LBS | SYSTOLIC BLOOD PRESSURE: 152 MMHG | HEART RATE: 106 BPM | TEMPERATURE: 97 F | OXYGEN SATURATION: 97 % | HEIGHT: 62 IN | DIASTOLIC BLOOD PRESSURE: 84 MMHG

## 2020-06-01 DIAGNOSIS — B18.2 CHRONIC HEPATITIS C WITHOUT HEPATIC COMA: ICD-10-CM

## 2020-06-01 DIAGNOSIS — Z01.818 PRE-OP EVALUATION: Primary | ICD-10-CM

## 2020-06-01 DIAGNOSIS — E11.51 TYPE II DIABETES MELLITUS WITH PERIPHERAL CIRCULATORY DISORDER: ICD-10-CM

## 2020-06-01 DIAGNOSIS — H26.9 CATARACT, UNSPECIFIED CATARACT TYPE, UNSPECIFIED LATERALITY: ICD-10-CM

## 2020-06-01 DIAGNOSIS — I15.2 HYPERTENSION ASSOCIATED WITH DIABETES: ICD-10-CM

## 2020-06-01 DIAGNOSIS — E11.59 HYPERTENSION ASSOCIATED WITH DIABETES: ICD-10-CM

## 2020-06-01 PROBLEM — M46.1 SACROILIITIS: Status: ACTIVE | Noted: 2020-06-01

## 2020-06-01 PROBLEM — T38.3X2A: Status: ACTIVE | Noted: 2020-06-01

## 2020-06-01 PROCEDURE — 99213 PR OFFICE/OUTPT VISIT, EST, LEVL III, 20-29 MIN: ICD-10-PCS | Mod: S$GLB,,, | Performed by: PHYSICIAN ASSISTANT

## 2020-06-01 PROCEDURE — 36415 COLL VENOUS BLD VENIPUNCTURE: CPT | Mod: PO

## 2020-06-01 PROCEDURE — 99499 RISK ADDL DX/OHS AUDIT: ICD-10-PCS | Mod: S$GLB,,, | Performed by: PHYSICIAN ASSISTANT

## 2020-06-01 PROCEDURE — 3044F PR MOST RECENT HEMOGLOBIN A1C LEVEL <7.0%: ICD-10-PCS | Mod: CPTII,S$GLB,, | Performed by: PHYSICIAN ASSISTANT

## 2020-06-01 PROCEDURE — 99213 OFFICE O/P EST LOW 20 MIN: CPT | Mod: S$GLB,,, | Performed by: PHYSICIAN ASSISTANT

## 2020-06-01 PROCEDURE — 99499 UNLISTED E&M SERVICE: CPT | Mod: S$GLB,,, | Performed by: PHYSICIAN ASSISTANT

## 2020-06-01 PROCEDURE — 80053 COMPREHEN METABOLIC PANEL: CPT

## 2020-06-01 PROCEDURE — 3008F PR BODY MASS INDEX (BMI) DOCUMENTED: ICD-10-PCS | Mod: CPTII,S$GLB,, | Performed by: PHYSICIAN ASSISTANT

## 2020-06-01 PROCEDURE — 3077F SYST BP >= 140 MM HG: CPT | Mod: CPTII,S$GLB,, | Performed by: PHYSICIAN ASSISTANT

## 2020-06-01 PROCEDURE — 3079F DIAST BP 80-89 MM HG: CPT | Mod: CPTII,S$GLB,, | Performed by: PHYSICIAN ASSISTANT

## 2020-06-01 PROCEDURE — 99999 PR PBB SHADOW E&M-EST. PATIENT-LVL III: ICD-10-PCS | Mod: PBBFAC,,, | Performed by: PHYSICIAN ASSISTANT

## 2020-06-01 PROCEDURE — 99999 PR PBB SHADOW E&M-EST. PATIENT-LVL III: CPT | Mod: PBBFAC,,, | Performed by: PHYSICIAN ASSISTANT

## 2020-06-01 PROCEDURE — 3044F HG A1C LEVEL LT 7.0%: CPT | Mod: CPTII,S$GLB,, | Performed by: PHYSICIAN ASSISTANT

## 2020-06-01 PROCEDURE — 3079F PR MOST RECENT DIASTOLIC BLOOD PRESSURE 80-89 MM HG: ICD-10-PCS | Mod: CPTII,S$GLB,, | Performed by: PHYSICIAN ASSISTANT

## 2020-06-01 PROCEDURE — 87522 HEPATITIS C REVRS TRNSCRPJ: CPT

## 2020-06-01 PROCEDURE — 3008F BODY MASS INDEX DOCD: CPT | Mod: CPTII,S$GLB,, | Performed by: PHYSICIAN ASSISTANT

## 2020-06-01 PROCEDURE — 3077F PR MOST RECENT SYSTOLIC BLOOD PRESSURE >= 140 MM HG: ICD-10-PCS | Mod: CPTII,S$GLB,, | Performed by: PHYSICIAN ASSISTANT

## 2020-06-01 RX ORDER — KETOROLAC TROMETHAMINE 5 MG/ML
SOLUTION OPHTHALMIC
COMMUNITY
Start: 2020-05-26 | End: 2020-07-08

## 2020-06-01 RX ORDER — LOSARTAN POTASSIUM 50 MG/1
TABLET, FILM COATED ORAL
COMMUNITY
Start: 2020-05-06 | End: 2020-07-08

## 2020-06-01 RX ORDER — DIFLUPREDNATE OPHTHALMIC 0.5 MG/ML
EMULSION OPHTHALMIC
COMMUNITY
Start: 2020-05-26 | End: 2020-07-08

## 2020-06-01 RX ORDER — TRAMADOL HYDROCHLORIDE 50 MG/1
TABLET, COATED ORAL
COMMUNITY
Start: 2020-05-14 | End: 2020-06-01

## 2020-06-01 RX ORDER — GABAPENTIN 300 MG/1
CAPSULE ORAL
COMMUNITY
Start: 2020-04-29

## 2020-06-01 NOTE — PROGRESS NOTES
Subjective:       Patient ID: Mich Knight is a 59 y.o. female.    Chief Complaint: surgical Clearance (Cataract 6/6/20)    Patient with DM2, HTN, history of hep c s/p tx presents for medical optimization prior to cataract surgery.   No complaints.  All chronic conditions are stable     Review of Systems   Constitutional: Negative for fatigue.   Eyes: Positive for visual disturbance.   Respiratory: Negative for chest tightness and shortness of breath.    Cardiovascular: Negative for chest pain, palpitations and leg swelling.   Neurological: Negative for dizziness, light-headedness and headaches.       Objective:      Physical Exam   Constitutional: She appears well-developed and well-nourished. She is cooperative. No distress.   HENT:   Head: Normocephalic and atraumatic.   Cardiovascular: Normal rate, regular rhythm and normal heart sounds.   Pulmonary/Chest: Effort normal and breath sounds normal.   Musculoskeletal:        Right lower leg: She exhibits no edema.        Left lower leg: She exhibits no edema.   Neurological: She is alert.   Skin: Skin is warm and dry.       Assessment:       1. Pre-op evaluation    2. Cataract, unspecified cataract type, unspecified laterality    3. Type II diabetes mellitus with peripheral circulatory disorder    4. Hypertension associated with diabetes        Plan:       Mich was seen today for surgical clearance.    Diagnoses and all orders for this visit:    Pre-op evaluation    Cataract, unspecified cataract type, unspecified laterality    Type II diabetes mellitus with peripheral circulatory disorder    Hypertension associated with diabetes  Moderate risk patient is medically optimized for low risk procedure

## 2020-06-02 LAB
ALBUMIN SERPL BCP-MCNC: 3.8 G/DL (ref 3.5–5.2)
ALP SERPL-CCNC: 49 U/L (ref 55–135)
ALT SERPL W/O P-5'-P-CCNC: 29 U/L (ref 10–44)
ANION GAP SERPL CALC-SCNC: 11 MMOL/L (ref 8–16)
AST SERPL-CCNC: 27 U/L (ref 10–40)
BILIRUB SERPL-MCNC: 0.2 MG/DL (ref 0.1–1)
BUN SERPL-MCNC: 26 MG/DL (ref 6–20)
CALCIUM SERPL-MCNC: 9.8 MG/DL (ref 8.7–10.5)
CHLORIDE SERPL-SCNC: 103 MMOL/L (ref 95–110)
CO2 SERPL-SCNC: 21 MMOL/L (ref 23–29)
CREAT SERPL-MCNC: 1.1 MG/DL (ref 0.5–1.4)
EST. GFR  (AFRICAN AMERICAN): >60 ML/MIN/1.73 M^2
EST. GFR  (NON AFRICAN AMERICAN): 55.5 ML/MIN/1.73 M^2
GLUCOSE SERPL-MCNC: 264 MG/DL (ref 70–110)
POTASSIUM SERPL-SCNC: 4.1 MMOL/L (ref 3.5–5.1)
PROT SERPL-MCNC: 8.1 G/DL (ref 6–8.4)
SODIUM SERPL-SCNC: 135 MMOL/L (ref 136–145)

## 2020-06-04 ENCOUNTER — TELEPHONE (OUTPATIENT)
Dept: HEPATOLOGY | Facility: CLINIC | Age: 60
End: 2020-06-04

## 2020-06-04 ENCOUNTER — PATIENT MESSAGE (OUTPATIENT)
Dept: HEPATOLOGY | Facility: CLINIC | Age: 60
End: 2020-06-04

## 2020-06-04 DIAGNOSIS — B18.2 CHRONIC HEPATITIS C WITHOUT HEPATIC COMA: Primary | ICD-10-CM

## 2020-06-04 LAB
HCV RNA SERPL NAA+PROBE-LOG IU: <1.08 LOG (10) IU/ML
HCV RNA SERPL QL NAA+PROBE: NOT DETECTED IU/ML
HCV RNA SPEC NAA+PROBE-ACNC: <12 IU/ML

## 2020-06-04 NOTE — TELEPHONE ENCOUNTER
HCV LAB REVIEW  Completed 12 wks epclusa 3/2020  Krystyna 2b, tx naive  F0    6/1/20 HCV neg, CMP stable -- SVR9    Pt notified via [facility]    Not sure why these were done early  pls reschedule:  - HCV RNA - SVR12 - 6/19/20  (dont need to repeat CMP)

## 2020-06-08 ENCOUNTER — EPISODE CHANGES (OUTPATIENT)
Dept: HEPATOLOGY | Facility: CLINIC | Age: 60
End: 2020-06-08

## 2020-06-24 ENCOUNTER — PATIENT OUTREACH (OUTPATIENT)
Dept: ADMINISTRATIVE | Facility: HOSPITAL | Age: 60
End: 2020-06-24

## 2020-06-24 DIAGNOSIS — E11.9 DIABETES MELLITUS WITHOUT COMPLICATION: Primary | ICD-10-CM

## 2020-06-24 NOTE — PROGRESS NOTES
Chart review completed 06/24/2020.  Care Everywhere updates requested and reviewed.  Immunizations reconciled. Media reviewed.     DIS, Lab sarah, and quest reviewed     WOG orders placed. EYE CAM  Requested COLONOSCOPY  records     PORTAL MESSAGE SENT    Health Maintenance Due   Topic Date Due    TETANUS VACCINE  07/13/1979    Low Dose Statin  07/13/1982    Colorectal Cancer Screening  04/27/2019    Shingles Vaccine (2 of 2) 10/18/2019    Foot Exam  03/19/2020    Lipid Panel  03/20/2020    Eye Exam  06/24/2020

## 2020-06-24 NOTE — LETTER
AUTHORIZATION FOR RELEASE OF   CONFIDENTIAL INFORMATION    Dear DR. RIK COOPER,    We are seeing Mich Knight, date of birth 1960, in the clinic at [facility]. Huang Nicholson MD is the patient's PCP. Mich Knight has an outstanding lab/procedure at the time we reviewed her chart. In order to help keep her health information updated, she has authorized us to request the following medical record(s):        (  )  MAMMOGRAM                                      (X  )  COLONOSCOPY      (  )  PAP SMEAR                                          (  )  OUTSIDE LAB RESULTS     (  )  DEXA SCAN                                          (  )  EYE EXAM            (  )  FOOT EXAM                                          (  )  ENTIRE RECORD     (  )  OUTSIDE IMMUNIZATIONS                 (  )  _______________         Please fax records to [facility], Joseph Oschwald, MD, [phone]    Charito Terry LPN  Clinical Care Coordinator  [address] [address]  P: [phone]  F: [phone]            Patient Name: Mich Knight  : 1960  Patient Phone #: [phone]

## 2020-06-29 ENCOUNTER — CLINICAL SUPPORT (OUTPATIENT)
Dept: FAMILY MEDICINE | Facility: CLINIC | Age: 60
End: 2020-06-29
Payer: MEDICARE

## 2020-06-29 VITALS — DIASTOLIC BLOOD PRESSURE: 88 MMHG | SYSTOLIC BLOOD PRESSURE: 138 MMHG

## 2020-06-29 DIAGNOSIS — Z01.30 BP CHECK: Primary | ICD-10-CM

## 2020-06-29 PROCEDURE — 99999 PR PBB SHADOW E&M-EST. PATIENT-LVL III: ICD-10-PCS | Mod: PBBFAC,,,

## 2020-06-29 PROCEDURE — 99999 PR PBB SHADOW E&M-EST. PATIENT-LVL III: CPT | Mod: PBBFAC,,,

## 2020-06-29 NOTE — PROGRESS NOTES
Mich Knight 59 y.o. female is here today for Blood Pressure check.       Review of patient's allergies indicates:  No Known Allergies  Creatinine   Date Value Ref Range Status   06/01/2020 1.1 0.5 - 1.4 mg/dL Final     Sodium   Date Value Ref Range Status   06/01/2020 135 (L) 136 - 145 mmol/L Final     Potassium   Date Value Ref Range Status   06/01/2020 4.1 3.5 - 5.1 mmol/L Final   ]  Patient verifies taking blood pressure medications on a regular basis at the same time of the day.     Current Outpatient Medications:     losartan (COZAAR) 50 MG tablet, Take 1 tablet (50 mg total) by mouth once daily., Disp: 90 tablet, Rfl: 3    aspirin (ECOTRIN) 81 MG EC tablet, Take 81 mg by mouth once daily., Disp: , Rfl:     besifloxacin (BESIVANCE) 0.6 % DrpS, , Disp: , Rfl:     COZAAR 50 mg tablet, , Disp: , Rfl:     difluprednate (DUREZOL) 0.05 % Drop ophthalmic solution, , Disp: , Rfl:     dulaglutide (TRULICITY) 0.75 mg/0.5 mL PnIj, Inject 0.5 mLs (0.75 mg total) into the skin every 7 days., Disp: 1.5 mL, Rfl: 5    gabapentin (NEURONTIN) 300 MG capsule, Take 300 mg by mouth 3 (three) times daily., Disp: , Rfl: 2    gabapentin (NEURONTIN) 300 MG capsule, , Disp: , Rfl:     ketorolac 0.5% (ACULAR) 0.5 % Drop, , Disp: , Rfl:     metFORMIN (GLUCOPHAGE) 1000 MG tablet, TAKE 1 TABLET BY MOUTH TWICE DAILY WITH MEALS, Disp: 180 tablet, Rfl: 3    traMADol (ULTRAM) 50 mg tablet, Take 50 mg by mouth 2 (two) times daily. , Disp: , Rfl:   No current facility-administered medications for this visit.     Facility-Administered Medications Ordered in Other Visits:     lactated ringers infusion, , Intravenous, Once PRN, Charles Knowles MD      BP: 138/88 ,   .

## 2020-07-02 ENCOUNTER — LAB VISIT (OUTPATIENT)
Dept: LAB | Facility: HOSPITAL | Age: 60
End: 2020-07-02
Attending: FAMILY MEDICINE
Payer: MEDICARE

## 2020-07-02 DIAGNOSIS — E11.59 HYPERTENSION ASSOCIATED WITH DIABETES: ICD-10-CM

## 2020-07-02 DIAGNOSIS — I15.2 HYPERTENSION ASSOCIATED WITH DIABETES: ICD-10-CM

## 2020-07-02 DIAGNOSIS — E78.5 HYPERLIPIDEMIA, UNSPECIFIED HYPERLIPIDEMIA TYPE: ICD-10-CM

## 2020-07-02 DIAGNOSIS — E11.9 TYPE 2 DIABETES MELLITUS WITHOUT COMPLICATION, WITHOUT LONG-TERM CURRENT USE OF INSULIN: ICD-10-CM

## 2020-07-02 DIAGNOSIS — B18.2 CHRONIC HEPATITIS C WITHOUT HEPATIC COMA: ICD-10-CM

## 2020-07-02 LAB
ALBUMIN SERPL BCP-MCNC: 3.7 G/DL (ref 3.5–5.2)
ALP SERPL-CCNC: 53 U/L (ref 55–135)
ALT SERPL W/O P-5'-P-CCNC: 24 U/L (ref 10–44)
ANION GAP SERPL CALC-SCNC: 13 MMOL/L (ref 8–16)
AST SERPL-CCNC: 23 U/L (ref 10–40)
BILIRUB SERPL-MCNC: 0.3 MG/DL (ref 0.1–1)
BUN SERPL-MCNC: 16 MG/DL (ref 6–20)
CALCIUM SERPL-MCNC: 10.1 MG/DL (ref 8.7–10.5)
CHLORIDE SERPL-SCNC: 101 MMOL/L (ref 95–110)
CHOLEST SERPL-MCNC: 268 MG/DL (ref 120–199)
CHOLEST/HDLC SERPL: 5.5 {RATIO} (ref 2–5)
CO2 SERPL-SCNC: 24 MMOL/L (ref 23–29)
CREAT SERPL-MCNC: 1 MG/DL (ref 0.5–1.4)
EST. GFR  (AFRICAN AMERICAN): >60 ML/MIN/1.73 M^2
EST. GFR  (NON AFRICAN AMERICAN): >60 ML/MIN/1.73 M^2
ESTIMATED AVG GLUCOSE: 180 MG/DL (ref 68–131)
GLUCOSE SERPL-MCNC: 176 MG/DL (ref 70–110)
HBA1C MFR BLD HPLC: 7.9 % (ref 4–5.6)
HDLC SERPL-MCNC: 49 MG/DL (ref 40–75)
HDLC SERPL: 18.3 % (ref 20–50)
LDLC SERPL CALC-MCNC: ABNORMAL MG/DL (ref 63–159)
NONHDLC SERPL-MCNC: 219 MG/DL
POTASSIUM SERPL-SCNC: 4.6 MMOL/L (ref 3.5–5.1)
PROT SERPL-MCNC: 7.9 G/DL (ref 6–8.4)
SODIUM SERPL-SCNC: 138 MMOL/L (ref 136–145)
TRIGL SERPL-MCNC: 420 MG/DL (ref 30–150)

## 2020-07-02 PROCEDURE — 80061 LIPID PANEL: CPT

## 2020-07-02 PROCEDURE — 87522 HEPATITIS C REVRS TRNSCRPJ: CPT

## 2020-07-02 PROCEDURE — 36415 COLL VENOUS BLD VENIPUNCTURE: CPT | Mod: PO

## 2020-07-02 PROCEDURE — 80053 COMPREHEN METABOLIC PANEL: CPT

## 2020-07-02 PROCEDURE — 83036 HEMOGLOBIN GLYCOSYLATED A1C: CPT

## 2020-07-06 ENCOUNTER — TELEPHONE (OUTPATIENT)
Dept: FAMILY MEDICINE | Facility: CLINIC | Age: 60
End: 2020-07-06

## 2020-07-06 NOTE — TELEPHONE ENCOUNTER
Spoke with Ms. Knight who informed me that she had eye [facility] about three weeks ago and she is more than sure that she had a diabetic eye exam. Informed that I will go ahead and send them a fax requesting a copy of the dm eye exam so that we can update her chart.

## 2020-07-08 ENCOUNTER — OFFICE VISIT (OUTPATIENT)
Dept: FAMILY MEDICINE | Facility: CLINIC | Age: 60
End: 2020-07-08
Payer: MEDICARE

## 2020-07-08 ENCOUNTER — PATIENT OUTREACH (OUTPATIENT)
Dept: ADMINISTRATIVE | Facility: HOSPITAL | Age: 60
End: 2020-07-08

## 2020-07-08 VITALS
BODY MASS INDEX: 26.82 KG/M2 | TEMPERATURE: 98 F | OXYGEN SATURATION: 96 % | WEIGHT: 145.75 LBS | HEART RATE: 100 BPM | HEIGHT: 62 IN | SYSTOLIC BLOOD PRESSURE: 148 MMHG | DIASTOLIC BLOOD PRESSURE: 88 MMHG

## 2020-07-08 DIAGNOSIS — E11.51 TYPE II DIABETES MELLITUS WITH PERIPHERAL CIRCULATORY DISORDER: Primary | ICD-10-CM

## 2020-07-08 DIAGNOSIS — I15.2 HYPERTENSION ASSOCIATED WITH DIABETES: ICD-10-CM

## 2020-07-08 DIAGNOSIS — E11.59 HYPERTENSION ASSOCIATED WITH DIABETES: ICD-10-CM

## 2020-07-08 DIAGNOSIS — E78.5 DYSLIPIDEMIA, GOAL LDL BELOW 100: ICD-10-CM

## 2020-07-08 DIAGNOSIS — M46.1 SACROILIITIS: ICD-10-CM

## 2020-07-08 PROCEDURE — 3079F PR MOST RECENT DIASTOLIC BLOOD PRESSURE 80-89 MM HG: ICD-10-PCS | Mod: CPTII,S$GLB,, | Performed by: PHYSICIAN ASSISTANT

## 2020-07-08 PROCEDURE — 3077F PR MOST RECENT SYSTOLIC BLOOD PRESSURE >= 140 MM HG: ICD-10-PCS | Mod: CPTII,S$GLB,, | Performed by: PHYSICIAN ASSISTANT

## 2020-07-08 PROCEDURE — 99214 OFFICE O/P EST MOD 30 MIN: CPT | Mod: S$GLB,,, | Performed by: PHYSICIAN ASSISTANT

## 2020-07-08 PROCEDURE — 99499 RISK ADDL DX/OHS AUDIT: ICD-10-PCS | Mod: S$GLB,,, | Performed by: PHYSICIAN ASSISTANT

## 2020-07-08 PROCEDURE — 3051F PR MOST RECENT HEMOGLOBIN A1C LEVEL 7.0 - < 8.0%: ICD-10-PCS | Mod: CPTII,S$GLB,, | Performed by: PHYSICIAN ASSISTANT

## 2020-07-08 PROCEDURE — 3079F DIAST BP 80-89 MM HG: CPT | Mod: CPTII,S$GLB,, | Performed by: PHYSICIAN ASSISTANT

## 2020-07-08 PROCEDURE — 3008F BODY MASS INDEX DOCD: CPT | Mod: CPTII,S$GLB,, | Performed by: PHYSICIAN ASSISTANT

## 2020-07-08 PROCEDURE — 99999 PR PBB SHADOW E&M-EST. PATIENT-LVL IV: CPT | Mod: PBBFAC,,, | Performed by: PHYSICIAN ASSISTANT

## 2020-07-08 PROCEDURE — 99214 PR OFFICE/OUTPT VISIT, EST, LEVL IV, 30-39 MIN: ICD-10-PCS | Mod: S$GLB,,, | Performed by: PHYSICIAN ASSISTANT

## 2020-07-08 PROCEDURE — 3077F SYST BP >= 140 MM HG: CPT | Mod: CPTII,S$GLB,, | Performed by: PHYSICIAN ASSISTANT

## 2020-07-08 PROCEDURE — 3051F HG A1C>EQUAL 7.0%<8.0%: CPT | Mod: CPTII,S$GLB,, | Performed by: PHYSICIAN ASSISTANT

## 2020-07-08 PROCEDURE — 99999 PR PBB SHADOW E&M-EST. PATIENT-LVL IV: ICD-10-PCS | Mod: PBBFAC,,, | Performed by: PHYSICIAN ASSISTANT

## 2020-07-08 PROCEDURE — 99499 UNLISTED E&M SERVICE: CPT | Mod: S$GLB,,, | Performed by: PHYSICIAN ASSISTANT

## 2020-07-08 PROCEDURE — 3008F PR BODY MASS INDEX (BMI) DOCUMENTED: ICD-10-PCS | Mod: CPTII,S$GLB,, | Performed by: PHYSICIAN ASSISTANT

## 2020-07-08 RX ORDER — LOSARTAN POTASSIUM 100 MG/1
100 TABLET ORAL DAILY
Qty: 90 TABLET | Refills: 3 | Status: SHIPPED | OUTPATIENT
Start: 2020-07-08 | End: 2021-07-08

## 2020-07-08 RX ORDER — DULAGLUTIDE 1.5 MG/.5ML
1.5 INJECTION, SOLUTION SUBCUTANEOUS
Qty: 1.5 PEN | Refills: 11 | Status: SHIPPED | OUTPATIENT
Start: 2020-07-08

## 2020-07-08 RX ORDER — ATORVASTATIN CALCIUM 20 MG/1
20 TABLET, FILM COATED ORAL DAILY
Qty: 90 TABLET | Refills: 3 | Status: SHIPPED | OUTPATIENT
Start: 2020-07-08 | End: 2021-07-08

## 2020-07-08 NOTE — PROGRESS NOTES
Subjective:       Patient ID: Mich Knight is a 59 y.o. female.    Chief Complaint: Follow-up (6 months)    Patient with uncontrolled type 2 diabetes, uncontrolled hypertension, uncontrolled hyperlipidemia presents for routine follow-up.  Patient has been compliant with her medications.  She states that her blood sugars been running in the 200s.  She is pleased with her blood sugar being the 200s and states that typically her glucose is in the 400s.  Patient is not monitor blood pressure at home.  Patients patient medical/surgical, social and family histories have been reviewed       Diabetes  She has type 2 diabetes mellitus. No MedicAlert identification noted. The initial diagnosis of diabetes was made 14 years ago. Hypoglycemia symptoms include dizziness, mood changes and sleepiness. Pertinent negatives for hypoglycemia include no confusion, headaches, hunger, nervousness/anxiousness, pallor, seizures, speech difficulty, sweats or tremors. Associated symptoms include blurred vision, fatigue, foot paresthesias, polydipsia, polyuria, visual change and weakness. Pertinent negatives for diabetes include no chest pain, no foot ulcerations, no polyphagia and no weight loss. Pertinent negatives for hypoglycemia complications include no blackouts, no hospitalization, no nocturnal hypoglycemia, no required assistance and no required glucagon injection. Symptoms are stable. Diabetic complications include autonomic neuropathy, peripheral neuropathy and retinopathy. Pertinent negatives for diabetic complications include no CVA, heart disease, impotence, nephropathy or PVD. Risk factors for coronary artery disease include dyslipidemia, family history, hypertension, obesity, stress, tobacco exposure and diabetes mellitus. Current diabetic treatment includes oral agent (dual therapy). She is compliant with treatment all of the time. Her weight is fluctuating minimally. She is following a generally healthy diet. When asked  about meal planning, she reported none. She has not had a previous visit with a dietitian. She participates in exercise every other day. She monitors blood glucose at home 3-4 x per week. Blood glucose monitoring compliance is good. She does not see a podiatrist.Eye exam is current.     Review of Systems   Constitutional: Positive for fatigue. Negative for weight loss.   Eyes: Positive for blurred vision.   Cardiovascular: Negative for chest pain.   Endocrine: Positive for polydipsia and polyuria. Negative for polyphagia.   Genitourinary: Negative for impotence.   Skin: Negative for pallor.   Neurological: Positive for dizziness and weakness. Negative for tremors, seizures, speech difficulty and headaches.   Psychiatric/Behavioral: Negative for confusion. The patient is not nervous/anxious.        Objective:      Physical Exam  Constitutional:       General: She is not in acute distress.     Appearance: She is well-developed.   HENT:      Head: Normocephalic and atraumatic.   Cardiovascular:      Rate and Rhythm: Normal rate and regular rhythm.      Pulses:           Dorsalis pedis pulses are 2+ on the right side and 2+ on the left side.      Heart sounds: Normal heart sounds.   Pulmonary:      Effort: Pulmonary effort is normal.      Breath sounds: Normal breath sounds.   Musculoskeletal:      Right lower leg: No edema.      Left lower leg: No edema.      Right foot: No deformity.      Left foot: No deformity.   Feet:      Right foot:      Protective Sensation: 5 sites tested. 1 site sensed.     Skin integrity: No skin breakdown.      Left foot:      Protective Sensation: 5 sites tested. 1 site sensed.     Skin integrity: No skin breakdown.   Skin:     General: Skin is warm and dry.   Neurological:      Mental Status: She is alert.   Psychiatric:         Behavior: Behavior is cooperative.         Assessment:       1. Type II diabetes mellitus with peripheral circulatory disorder    2. Hypertension associated with  diabetes    3. Dyslipidemia, goal LDL below 100    4. Sacroiliitis        Plan:       Mich was seen today for follow-up.    Diagnoses and all orders for this visit:    Type II diabetes mellitus with peripheral circulatory disorder, uncontrolled  -     Comprehensive metabolic panel; Future  -     Lipid Panel; Future  -     Hemoglobin A1C; Future      increased dulaglutide (TRULICITY) 1.5 mg/0.5 mL pen injector; Inject 1.5 mg into the skin every 7 days.  Hypertension associated with diabetes  -     Comprehensive metabolic panel; Future  -     Lipid Panel; Future  -     Hemoglobin A1C; Future   losartan (COZAAR) 100 MG tablet; Take 1 tablet (100 mg total) by mouth once daily.  Dyslipidemia  -  -    -     atorvastatin (LIPITOR) 20 MG tablet; Take 1 tablet (20 mg total) by mouth once daily.         sacroiliitis   Continue per pain specialist  Follow up in about 3 months (around 10/8/2020) for labs one week prior, nurse BP in 2-4 weeks.

## 2020-07-09 ENCOUNTER — TELEPHONE (OUTPATIENT)
Dept: HEPATOLOGY | Facility: CLINIC | Age: 60
End: 2020-07-09

## 2020-07-09 ENCOUNTER — PATIENT OUTREACH (OUTPATIENT)
Dept: ADMINISTRATIVE | Facility: HOSPITAL | Age: 60
End: 2020-07-09

## 2020-07-09 DIAGNOSIS — Z86.19 HISTORY OF HEPATITIS C: Primary | ICD-10-CM

## 2020-07-09 NOTE — TELEPHONE ENCOUNTER
HCV LAB REVIEW  Completed 12 wks epclusa 3/2020  Krystyna 2b, tx naive  F0    7/2/20   HCV neg    Pt notified via [facility]    These labs document SVR following successful HCV treatment with Epclusa    This is consistent with a cure. Relapse is not expected.   No immunity is conferred and patient could be reinfected.     Please schedule   - HCV RNA in 6 months

## 2020-07-09 NOTE — PROGRESS NOTES
No colonoscopy on file at [facility] as of of 7/9/20  Colonoscopy requested again from Dr Silva Lora office 7/9/20

## 2020-09-11 DIAGNOSIS — M54.16 LUMBAR RADICULOPATHY: Primary | ICD-10-CM

## 2020-09-12 ENCOUNTER — HOSPITAL ENCOUNTER (OUTPATIENT)
Dept: RADIOLOGY | Facility: HOSPITAL | Age: 60
Discharge: HOME | End: 2020-09-12
Attending: ORTHOPAEDIC SURGERY
Payer: MEDICARE

## 2020-09-12 DIAGNOSIS — M54.16 LUMBAR RADICULOPATHY: ICD-10-CM

## 2020-09-12 PROCEDURE — 72100 XR LUMBAR SPINE AP AND LAT WITH FLEX/EXT: ICD-10-PCS | Mod: 26,,, | Performed by: RADIOLOGY

## 2020-09-12 PROCEDURE — 72120 X-RAY BEND ONLY L-S SPINE: CPT | Mod: TC,FY

## 2020-09-12 PROCEDURE — 72120 X-RAY BEND ONLY L-S SPINE: CPT | Mod: 26,,, | Performed by: RADIOLOGY

## 2020-09-12 PROCEDURE — 72120 XR LUMBAR SPINE AP AND LAT WITH FLEX/EXT: ICD-10-PCS | Mod: 26,,, | Performed by: RADIOLOGY

## 2020-09-12 PROCEDURE — 72100 X-RAY EXAM L-S SPINE 2/3 VWS: CPT | Mod: 26,,, | Performed by: RADIOLOGY

## 2020-09-17 ENCOUNTER — HOSPITAL ENCOUNTER (OUTPATIENT)
Dept: RADIOLOGY | Facility: HOSPITAL | Age: 60
Discharge: HOME | End: 2020-09-17
Attending: ORTHOPAEDIC SURGERY
Payer: MEDICARE

## 2020-09-17 DIAGNOSIS — M54.16 LUMBAR RADICULOPATHY: ICD-10-CM

## 2020-09-17 PROCEDURE — 72148 MRI LUMBAR SPINE W/O DYE: CPT | Mod: TC,PO

## 2020-09-23 ENCOUNTER — PATIENT OUTREACH (OUTPATIENT)
Dept: ADMINISTRATIVE | Facility: HOSPITAL | Age: 60
End: 2020-09-23

## 2020-09-23 DIAGNOSIS — Z12.31 SCREENING MAMMOGRAM, ENCOUNTER FOR: Primary | ICD-10-CM

## 2020-09-23 NOTE — PROGRESS NOTES
Chart review completed 2020.  Care Everywhere updates requested and reviewed.  Immunizations reconciled. Media reports reviewed.  Duplicate HM overrides and  orders removed.  Overdue HM topic chart audit and/or requested.  Overdue lab testing linked to upcoming lab appointments if applies.    DIS reviewed      Mammogram and DEXA  YES    WOG orders placed. MAMMO  Letter mailed.  PORTAL    MAMMOGRAM APPT TASKED    Health Maintenance Due   Topic Date Due    TETANUS VACCINE  1979    Colorectal Cancer Screening  2019    Shingles Vaccine (2 of 2) 10/18/2019    Mammogram  10/16/2020

## 2020-09-30 ENCOUNTER — LAB VISIT (OUTPATIENT)
Dept: LAB | Facility: HOSPITAL | Age: 60
End: 2020-09-30
Attending: PHYSICIAN ASSISTANT
Payer: MEDICARE

## 2020-09-30 DIAGNOSIS — E11.51 TYPE II DIABETES MELLITUS WITH PERIPHERAL CIRCULATORY DISORDER: ICD-10-CM

## 2020-09-30 DIAGNOSIS — I15.2 HYPERTENSION ASSOCIATED WITH DIABETES: ICD-10-CM

## 2020-09-30 DIAGNOSIS — E11.59 HYPERTENSION ASSOCIATED WITH DIABETES: ICD-10-CM

## 2020-09-30 LAB
ALBUMIN SERPL BCP-MCNC: 3.6 G/DL (ref 3.5–5.2)
ALP SERPL-CCNC: 50 U/L (ref 55–135)
ALT SERPL W/O P-5'-P-CCNC: 23 U/L (ref 10–44)
ANION GAP SERPL CALC-SCNC: 13 MMOL/L (ref 8–16)
AST SERPL-CCNC: 25 U/L (ref 10–40)
BILIRUB SERPL-MCNC: 0.3 MG/DL (ref 0.1–1)
BUN SERPL-MCNC: 15 MG/DL (ref 6–20)
CALCIUM SERPL-MCNC: 9.4 MG/DL (ref 8.7–10.5)
CHLORIDE SERPL-SCNC: 102 MMOL/L (ref 95–110)
CHOLEST SERPL-MCNC: 125 MG/DL (ref 120–199)
CHOLEST/HDLC SERPL: 3 {RATIO} (ref 2–5)
CO2 SERPL-SCNC: 24 MMOL/L (ref 23–29)
CREAT SERPL-MCNC: 0.8 MG/DL (ref 0.5–1.4)
EST. GFR  (AFRICAN AMERICAN): >60 ML/MIN/1.73 M^2
EST. GFR  (NON AFRICAN AMERICAN): >60 ML/MIN/1.73 M^2
ESTIMATED AVG GLUCOSE: 148 MG/DL (ref 68–131)
GLUCOSE SERPL-MCNC: 167 MG/DL (ref 70–110)
HBA1C MFR BLD HPLC: 6.8 % (ref 4–5.6)
HDLC SERPL-MCNC: 41 MG/DL (ref 40–75)
HDLC SERPL: 32.8 % (ref 20–50)
LDLC SERPL CALC-MCNC: 33.8 MG/DL (ref 63–159)
NONHDLC SERPL-MCNC: 84 MG/DL
POTASSIUM SERPL-SCNC: 4.3 MMOL/L (ref 3.5–5.1)
PROT SERPL-MCNC: 7.4 G/DL (ref 6–8.4)
SODIUM SERPL-SCNC: 139 MMOL/L (ref 136–145)
TRIGL SERPL-MCNC: 251 MG/DL (ref 30–150)

## 2020-09-30 PROCEDURE — 36415 COLL VENOUS BLD VENIPUNCTURE: CPT | Mod: PO

## 2020-09-30 PROCEDURE — 80061 LIPID PANEL: CPT

## 2020-09-30 PROCEDURE — 83036 HEMOGLOBIN GLYCOSYLATED A1C: CPT

## 2020-09-30 PROCEDURE — 80053 COMPREHEN METABOLIC PANEL: CPT

## 2020-10-02 DIAGNOSIS — E11.65 UNCONTROLLED TYPE 2 DIABETES MELLITUS WITH HYPERGLYCEMIA: ICD-10-CM

## 2020-10-05 RX ORDER — METFORMIN HYDROCHLORIDE 1000 MG/1
1000 TABLET ORAL 2 TIMES DAILY WITH MEALS
Qty: 180 TABLET | Refills: 3 | Status: SHIPPED | OUTPATIENT
Start: 2020-10-05

## 2020-10-20 DIAGNOSIS — M54.16 LUMBAR RADICULOPATHY: Primary | ICD-10-CM

## 2020-10-30 ENCOUNTER — PATIENT MESSAGE (OUTPATIENT)
Dept: ADMINISTRATIVE | Facility: HOSPITAL | Age: 60
End: 2020-10-30

## 2020-12-11 ENCOUNTER — PATIENT MESSAGE (OUTPATIENT)
Dept: OTHER | Facility: OTHER | Age: 60
End: 2020-12-11

## 2021-01-04 ENCOUNTER — PATIENT MESSAGE (OUTPATIENT)
Dept: ADMINISTRATIVE | Facility: HOSPITAL | Age: 61
End: 2021-01-04

## 2021-03-05 NOTE — ED PROVIDER NOTES
"Encounter Date: 5/8/2019    SCRIBE #1 NOTE: Kalyani BRAUN, vivi scribing for, and in the presence of, Dr. Senthil Cancino.       History     Chief Complaint   Patient presents with    Back Pain     back pain radiating down right leg       Time seen by provider: 7:39 AM on 05/08/2019    Mich Knight is a 58 y.o. female with a history of DDD who presents to the ED with complaints of right lower back pain that radiates down her right leg and is described as a "stabbing" sensation. She denies lower extremity numbness or weakness and denies loss of bladder control. She endorses pain is chronic but has gradually worsened for the past x1 month s/p right L4-5 and L5-S1 transforaminal epidural steroid injection under fluoroscopy. The patient visited her new pain management doctor yesterday to receive injections but denies new pain medications. She has taken Tramadol for the pain today without relief. The patient denies recent fevers, abdominal pain, nausea, vomiting, or onset of any other new symptoms currently. She has no other medical concerns or complaints at this moment. Other PMHx includes DM, HTN, HLD, and Hep C. SHx includes cholecystectomy, radiofrequency ablation of lumbar medial branch nerve at single level, and transforaminal epidural injection of steroid. NKDA noted.     The history is provided by the patient.     Review of patient's allergies indicates:  No Known Allergies  Past Medical History:   Diagnosis Date    DDD (degenerative disc disease), lumbar     Diabetes mellitus     Essential tremor     Hepatitis C     HLD (hyperlipidemia)     HTN (hypertension)      Past Surgical History:   Procedure Laterality Date    BLOCK- BRANCH- SACROILIAC Right 2/9/2018    Per[facility]    Block-nerve-medial branch-lumbar Bilateral 7/10/2018    [facility] at [facility]    c-sec  1990    CHOLECYSTECTOMY      Injection,steroid,epidural,transforaminal approach L4-5, L5-S1 " Right 4/8/2019    Performed by [facility] at [facility] OR    INJECTION-STEROID-EPIDURAL-LUMBAR Right 3/27/2018    [facility]    INJECTION-STEROID-EPIDURAL-TRANSFORAMINAL Bilateral 5/21/2018    [facility] at [facility]    RADIOFREQUENCY ABLATION, NERVE, MEDIAL BRANCH, LUMBAR, 1 LEVEL Bilateral 7/27/2018    [facility] at [facility]    Radiofrequency Ablation, Nerve, Spinal, Lumbar, Medial Branch, L2,3,4,5 Bilateral 2/7/2019    Performed by [facility] at [facility]     Family History   Problem Relation Age of Onset    Heart disease Mother     Macular degeneration Mother     Diabetes type II Father     Dementia Father     Heart disease Father      Social History     Tobacco Use    Smoking status: Current Every Day Smoker     Packs/day: 0.50     Years: 40.00     Pack years: 20.00    Smokeless tobacco: Never Used   Substance Use Topics    Alcohol use: Yes     Alcohol/week: 0.0 - 3.0 oz     Frequency: 2-4 times a month     Drinks per session: 5 or 6    Drug use: No     Review of Systems   Constitutional: Negative for fever.   Respiratory: Negative for cough.    Cardiovascular: Negative for leg swelling.   Gastrointestinal: Negative for abdominal pain, nausea and vomiting.   Genitourinary: Negative for difficulty urinating.        - urinary incontinence   Musculoskeletal: Positive for back pain. Negative for gait problem, joint swelling and neck pain.   Skin: Negative for rash.   Neurological: Negative for weakness and numbness.   Hematological: Does not bruise/bleed easily.   Psychiatric/Behavioral: The patient is not nervous/anxious.        Physical Exam     Initial Vitals [05/08/19 0729]   BP Pulse Resp Temp SpO2   131/84 82 16 98.3 °F (36.8 °C) 98 %      MAP       --         Physical Exam    Nursing note and vitals reviewed.  Constitutional: She appears well-developed and well-nourished. She is not diaphoretic. No distress.   HENT:   Head:  Normocephalic and atraumatic.   Eyes: EOM are normal. Pupils are equal, round, and reactive to light.   Neck: Normal range of motion. Neck supple.   Cardiovascular: Normal rate, regular rhythm, normal heart sounds and intact distal pulses. Exam reveals no gallop and no friction rub.    No murmur heard.  Pulmonary/Chest: Breath sounds normal. No respiratory distress. She has no wheezes. She has no rhonchi. She has no rales.   Abdominal: Soft. Bowel sounds are normal. There is no tenderness.   Musculoskeletal: Normal range of motion. She exhibits tenderness.   Mild right lower back tenderness. +SLR on the right.    Neurological: She is alert and oriented to person, place, and time.   Skin: Skin is warm and dry.   Psychiatric: She has a normal mood and affect. Her behavior is normal. Judgment and thought content normal.         ED Course   Procedures  Labs Reviewed - No data to display       Imaging Results    None          Medical Decision Making:   History:   Old Medical Records: I decided to obtain old medical records.  Initial Assessment:   58-year-old female presented with a chief complaint of back pain.  Differential Diagnosis:   Initial differential diagnosis includes but is not limited to: sciatica, musculoskeletal pain, and DDD.   ED Management:  The patient was urgently evaluated in the emergency department, her evaluation was significant for a middle-age female with mild tenderness to the right lower back.  The patient also has a positive straight leg test noted on the right.  The patient likely has a right-sided sciatica.  She was treated with a dose of parental Toradol and parental Norflex.  She is stable for discharge to home.  I will not discharge her to home with anything for pain, she recently saw a new pain management physician yesterday and is instructed to get further pain medication from him.  She is to otherwise follow up with him for further care.            Scribe Attestation:   Scribe #1: I  performed the above scribed service and the documentation accurately describes the services I performed. I attest to the accuracy of the note.           I, Dr. Senthil Cancino, personally performed the services described in this documentation. All medical record entries made by the scribe were at my direction and in my presence.  I have reviewed the chart and agree that the record reflects my personal performance and is accurate and complete. Senthil Cancino MD.  5:40 PM 05/08/2019       Clinical Impression:       ICD-10-CM ICD-9-CM   1. Right sided sciatica M54.31 724.3         Disposition:   Disposition: Discharged  Condition: Stable                        Senthil Cancino MD  05/08/19 1744     Pt stable at this time

## 2021-04-05 ENCOUNTER — PATIENT MESSAGE (OUTPATIENT)
Dept: ADMINISTRATIVE | Facility: HOSPITAL | Age: 61
End: 2021-04-05

## 2021-05-18 DIAGNOSIS — Z12.11 COLON CANCER SCREENING: ICD-10-CM

## 2021-07-07 ENCOUNTER — PATIENT MESSAGE (OUTPATIENT)
Dept: ADMINISTRATIVE | Facility: HOSPITAL | Age: 61
End: 2021-07-07

## 2021-08-04 ENCOUNTER — PATIENT MESSAGE (OUTPATIENT)
Dept: ADMINISTRATIVE | Facility: HOSPITAL | Age: 61
End: 2021-08-04

## 2021-08-24 ENCOUNTER — PATIENT OUTREACH (OUTPATIENT)
Dept: ADMINISTRATIVE | Facility: HOSPITAL | Age: 61
End: 2021-08-24

## 2021-08-24 ENCOUNTER — PATIENT MESSAGE (OUTPATIENT)
Dept: ADMINISTRATIVE | Facility: HOSPITAL | Age: 61
End: 2021-08-24

## 2021-09-01 ENCOUNTER — TELEPHONE (OUTPATIENT)
Dept: ADMINISTRATIVE | Facility: HOSPITAL | Age: 61
End: 2021-09-01

## 2021-10-07 ENCOUNTER — PATIENT MESSAGE (OUTPATIENT)
Dept: ADMINISTRATIVE | Facility: HOSPITAL | Age: 61
End: 2021-10-07

## 2021-12-01 DIAGNOSIS — Z12.31 OTHER SCREENING MAMMOGRAM: ICD-10-CM

## 2021-12-20 ENCOUNTER — PATIENT OUTREACH (OUTPATIENT)
Dept: ADMINISTRATIVE | Facility: HOSPITAL | Age: 61
End: 2021-12-20
Payer: MEDICARE

## 2022-03-08 ENCOUNTER — PATIENT OUTREACH (OUTPATIENT)
Dept: ADMINISTRATIVE | Facility: HOSPITAL | Age: 62
End: 2022-03-08
Payer: MEDICARE

## 2022-03-15 ENCOUNTER — PATIENT OUTREACH (OUTPATIENT)
Dept: ADMINISTRATIVE | Facility: HOSPITAL | Age: 62
End: 2022-03-15
Payer: MEDICARE

## 2022-03-15 NOTE — PROGRESS NOTES
Diabetic eye exam GAP report-Unable to leave VM for pt to call back regarding overdue Diabetic eye exam.

## 2022-05-31 ENCOUNTER — PATIENT MESSAGE (OUTPATIENT)
Dept: ADMINISTRATIVE | Facility: HOSPITAL | Age: 62
End: 2022-05-31
Payer: MEDICARE

## 2023-12-09 NOTE — PROGRESS NOTES
Subjective:       Patient ID: Mich Knight is a 58 y.o. female.    Chief Complaint: Pain of the Lumbar Spine (L/sp: 30 years; recent treatment include Lumbar injections with Dr Knowles, made worse, went to [facility] ER on 02/12/19, 5 days after injection; pain down right leg to knee; numbness to right leg; ) and Pain of the Thoracic Spine (T/sp x 30 years; pain is constant; pain to b/l sides of thoracic but not to chest; )      History of Present Illness  Patient has had both rhizotomy at multiple levels and most recently a right L45 and L5-S1 epidural steroid injection and none of these symptoms have been significantly improved. Her left leg feels fine her symptoms are mainly in the right predominantly down to her knee but occasionally into her calf    Current Medications  Current Outpatient Medications   Medication Sig Dispense Refill    aspirin (ECOTRIN) 81 MG EC tablet Take 81 mg by mouth once daily.      dulaglutide (TRULICITY) 0.75 mg/0.5 mL PnIj Inject 0.5 mLs (0.75 mg total) into the skin every 7 days. 1.5 mL 5    losartan (COZAAR) 50 MG tablet Take 1 tablet (50 mg total) by mouth once daily. 90 tablet 3    metFORMIN (GLUCOPHAGE) 1000 MG tablet Take 1 tablet (1,000 mg total) by mouth 2 (two) times daily with meals. 180 tablet 1    pregabalin (LYRICA) 200 MG Cap Take 1 capsule (200 mg total) by mouth 2 (two) times daily. 60 capsule 2    traMADol (ULTRAM) 50 mg tablet Take 1 tablet (50 mg total) by mouth every 12 (twelve) hours as needed for Pain. 60 tablet 2    diclofenac (VOLTAREN) 75 MG EC tablet Take 1 tablet (75 mg total) by mouth 2 (two) times daily. 60 tablet 0     No current facility-administered medications for this visit.      Facility-Administered Medications Ordered in Other Visits   Medication Dose Route Frequency Provider Last Rate Last Dose    lactated ringers infusion   Intravenous Once PRN Charles Knowles MD           Allergies  Review of patient's allergies indicates:  No Known  Allergies    Past Medical History  Past Medical History:   Diagnosis Date    DDD (degenerative disc disease), lumbar     Diabetes mellitus     Essential tremor     Hepatitis C     HLD (hyperlipidemia)     HTN (hypertension)        Surgical History  Past Surgical History:   Procedure Laterality Date    BLOCK- BRANCH- SACROILIAC Right 2/9/2018    [facility]    Block-nerve-medial branch-lumbar Bilateral 7/10/2018    [facility]    c-sec  1990    CHOLECYSTECTOMY      Injection,steroid,epidural,transforaminal approach L4-5, L5-S1 Right 4/8/2019    [facility]    INJECTION-STEROID-EPIDURAL-LUMBAR Right 3/27/2018    [facility]    INJECTION-STEROID-EPIDURAL-TRANSFORAMINAL Bilateral 5/21/2018    [facility]    RADIOFREQUENCY ABLATION, NERVE, MEDIAL BRANCH, LUMBAR, 1 LEVEL Bilateral 7/27/2018    [facility]    Radiofrequency Ablation, Nerve, Spinal, Lumbar, Medial Branch, L2,3,4,5 Bilateral 2/7/2019    [facility]       Family History:   Family History   Problem Relation Age of Onset    Heart disease Mother     Macular degeneration Mother     Diabetes type II Father     Dementia Father     Heart disease Father        Social History:   Social History     Socioeconomic History    Marital status:      Spouse name: Not on file    Number of children: Not on file    Years of education: Not on file    Highest education level: Not on file   Occupational History    Not on file   Social Needs    Financial resource strain: Hard    Food insecurity:     Worry: Often true     Inability: Not on file    Transportation needs:     Medical: Yes     Non-medical: Yes   Tobacco Use    Smoking status: Current Every Day Smoker     Packs/day: 0.50     Years: 40.00     Pack years: 20.00    Smokeless tobacco: Never Used    Substance and Sexual Activity    Alcohol use: Yes     Alcohol/week: 0.0 - 3.0 oz     Frequency: 2-4 times a month     Drinks per session: 5 or 6    Drug use: No    Sexual activity: Not Currently   Lifestyle    Physical activity:     Days per week: 0 days     Minutes per session: 0 min    Stress: To some extent   Relationships    Social connections:     Talks on phone: Once a week     Gets together: Once a week     Attends [employer] service: Not on file     Active member of club or organization: No     Attends meetings of clubs or organizations: Never     Relationship status:    Other Topics Concern    Not on file   Social History Narrative    Does not work, reports being disabled due to back pain. Previously was .        Hospitalization/Major Diagnostic Procedure:     Review of Systems     General/Constitutional:  Chills denies. Fatigue denies. Fever denies. Weight gain denies. Weight loss denies.    Respiratory:  Shortness of breath denies.    Cardiovascular:  Chest pain denies.    Gastrointestinal:  Constipation denies. Diarrhea denies. Nausea denies. Vomiting denies.     Hematology:  Easy bruising denies. Prolonged bleeding denies.     Genitourinary:  Frequent urination denies. Pain in lower back denies. Painful urination denies.     Musculoskeletal:  See HPI for details    Skin:  Rash denies.    Neurologic:  Dizziness denies. Gait abnormalities denies. Seizures denies. Tingling/Numbess denies.    Psychiatric:  Anxiety denies. Depressed mood denies.     Objective:   Vital Signs:   Vitals:    04/29/19 1114   BP: 127/80   Pulse: 81        Physical Exam      General Examination:     Constitutional: The patient is alert and oriented to lace person and time. Mood is pleasant.     Head/Face: Normal facial features normal eyebrows    Eyes: Normal extraocular motion bilaterally    Lungs: Respirations are equal and unlabored    Gait is coordinated.    Cardiovascular: There are no swelling or  varicosities present.    Lymphatic: Negative for adenopathy    Skin: Normal    Neurological: Level of consciousness normal. Oriented to place person and time and situation    Psychiatric: Oriented to time place person and situation    Patient can stand erect has pain when doing so moderate tenderness noted L3-S1 bilaterally no spasm noted range of motion limited straight leg raising causes back pain peripheral pulses are intact    XRAY Report/ Interpretation : AP lateral x-rays of the thoracic spine were taken showing some mild diffuse degenerative changes with a long-standing in nature. Prior diagnostic studies provided were reviewed showing evidence of degenerative disc disease and spinal stenotic stenosis      Assessment:       1. DDD (degenerative disc disease), lumbar    2. Spondylosis of thoracic region without myelopathy or radiculopathy    3. Lumbar stenosis with neurogenic claudication        Plan:       Mich was seen today for pain and pain.    Diagnoses and all orders for this visit:    DDD (degenerative disc disease), lumbar  -     Ambulatory referral to Pain Clinic    Spondylosis of thoracic region without myelopathy or radiculopathy  -     X-Ray Thoracic Spine AP Lateral  -     Ambulatory referral to Pain Clinic    Lumbar stenosis with neurogenic claudication  -     Ambulatory referral to Pain Clinic    Other orders  -     Cancel: X-Ray Lumbar Spine Ap And Lateral  -     Cancel: X-Ray Pelvis Routine AP         Follow up in about 6 weeks (around 6/10/2019).    Treatment options were discussed patient is interested in conservative treatment not interested in surgery MRI study provided clearly shows evidence of spinal stenosis L4-5 L5-S1 however she prefers conservative treatment she has mainly axial back pain we will refer her to a different pain management doctor for a trial of medial branch blocks as per her request    This note was created using Dragon voice recognition software that occasionally  chest tube in correct position misinterpreted phrases or words.

## (undated) DEVICE — NDL SAFETY 25G X 1.5 ECLIPSE

## (undated) DEVICE — SHEET DRAPE MEDIUM

## (undated) DEVICE — APPLICATOR CHLORAPREP CLR 10.5

## (undated) DEVICE — NDL SPINAL SPINOCAN 22GX3.5

## (undated) DEVICE — NDL HYPODERMIC BLUNT 18G 1.5IN

## (undated) DEVICE — TUBING MINIBORE EXTENSION

## (undated) DEVICE — SYR 10CC LUER LOCK

## (undated) DEVICE — SYS LABEL CORRECT MED

## (undated) DEVICE — SYR DISP LL 5CC

## (undated) DEVICE — GLOVE SURG ULTRA TOUCH 7.5

## (undated) DEVICE — PAD GROUNDING DISPER ELECTRODE

## (undated) DEVICE — PAD ELECTROSURGICAL PAT PLATE

## (undated) DEVICE — CANNULA CVD 100MM X 20G

## (undated) DEVICE — CANNULA RADIOPAQUE 20G CURVED

## (undated) DEVICE — GLOVE PROTEXIS PI CLASSIC 7.5

## (undated) DEVICE — GLOVE PROTEXIS PI CLASSIC 6.5

## (undated) DEVICE — GLOVE PROTEXIS PI CLASSIC 6.0